# Patient Record
Sex: FEMALE | Race: WHITE | Employment: OTHER | URBAN - NONMETROPOLITAN AREA
[De-identification: names, ages, dates, MRNs, and addresses within clinical notes are randomized per-mention and may not be internally consistent; named-entity substitution may affect disease eponyms.]

---

## 2017-02-22 ENCOUNTER — TELEPHONE (OUTPATIENT)
Dept: PRIMARY CARE CLINIC | Age: 52
End: 2017-02-22

## 2017-03-03 ENCOUNTER — OFFICE VISIT (OUTPATIENT)
Dept: PRIMARY CARE CLINIC | Age: 52
End: 2017-03-03
Payer: MEDICAID

## 2017-03-03 VITALS
TEMPERATURE: 97.6 F | SYSTOLIC BLOOD PRESSURE: 111 MMHG | DIASTOLIC BLOOD PRESSURE: 71 MMHG | HEART RATE: 84 BPM | WEIGHT: 196.4 LBS | BODY MASS INDEX: 32.72 KG/M2 | HEIGHT: 65 IN | RESPIRATION RATE: 18 BRPM | OXYGEN SATURATION: 99 %

## 2017-03-03 DIAGNOSIS — E78.49 OTHER HYPERLIPIDEMIA: ICD-10-CM

## 2017-03-03 DIAGNOSIS — M95.4 CHEST WALL DEFORMITY: ICD-10-CM

## 2017-03-03 DIAGNOSIS — F41.9 ANXIETY: ICD-10-CM

## 2017-03-03 DIAGNOSIS — Z12.11 SCREENING FOR COLON CANCER: ICD-10-CM

## 2017-03-03 DIAGNOSIS — F32.A ANXIETY AND DEPRESSION: ICD-10-CM

## 2017-03-03 DIAGNOSIS — G71.00 MUSCULAR DYSTROPHY (HCC): ICD-10-CM

## 2017-03-03 DIAGNOSIS — Z13.1 ENCOUNTER FOR SCREENING FOR DIABETES MELLITUS: ICD-10-CM

## 2017-03-03 DIAGNOSIS — F41.9 ANXIETY AND DEPRESSION: ICD-10-CM

## 2017-03-03 DIAGNOSIS — R07.81 RIB PAIN ON RIGHT SIDE: ICD-10-CM

## 2017-03-03 PROCEDURE — 99214 OFFICE O/P EST MOD 30 MIN: CPT | Performed by: NURSE PRACTITIONER

## 2017-03-03 RX ORDER — NAPROXEN SODIUM 550 MG/1
550 TABLET ORAL 2 TIMES DAILY WITH MEALS
Qty: 60 TABLET | Refills: 0 | Status: SHIPPED | OUTPATIENT
Start: 2017-03-03 | End: 2017-04-27 | Stop reason: SDUPTHER

## 2017-03-03 RX ORDER — SERTRALINE HYDROCHLORIDE 100 MG/1
150 TABLET, FILM COATED ORAL DAILY
Qty: 135 TABLET | Refills: 1 | Status: SHIPPED | OUTPATIENT
Start: 2017-03-03 | End: 2017-04-27 | Stop reason: SDUPTHER

## 2017-03-08 ENCOUNTER — TELEPHONE (OUTPATIENT)
Dept: PRIMARY CARE CLINIC | Age: 52
End: 2017-03-08

## 2017-03-08 ASSESSMENT — ENCOUNTER SYMPTOMS
WHEEZING: 0
DIARRHEA: 0
BLOOD IN STOOL: 0
ABDOMINAL PAIN: 0
CONSTIPATION: 0
COUGH: 0

## 2017-03-17 ENCOUNTER — HOSPITAL ENCOUNTER (EMERGENCY)
Facility: HOSPITAL | Age: 52
Discharge: HOME | End: 2017-03-17
Attending: FAMILY MEDICINE | Admitting: FAMILY MEDICINE

## 2017-03-17 ENCOUNTER — APPOINTMENT (OUTPATIENT)
Dept: GENERAL RADIOLOGY | Facility: HOSPITAL | Age: 52
End: 2017-03-17

## 2017-03-17 VITALS
SYSTOLIC BLOOD PRESSURE: 143 MMHG | HEIGHT: 65 IN | HEART RATE: 81 BPM | RESPIRATION RATE: 20 BRPM | TEMPERATURE: 97.6 F | WEIGHT: 165 LBS | OXYGEN SATURATION: 98 % | DIASTOLIC BLOOD PRESSURE: 83 MMHG | BODY MASS INDEX: 27.49 KG/M2

## 2017-03-17 DIAGNOSIS — S92.335A CLOSED NONDISPLACED FRACTURE OF THIRD METATARSAL BONE OF LEFT FOOT, INITIAL ENCOUNTER: Primary | ICD-10-CM

## 2017-03-17 PROCEDURE — 73630 X-RAY EXAM OF FOOT: CPT

## 2017-03-17 PROCEDURE — 99283 EMERGENCY DEPT VISIT LOW MDM: CPT

## 2017-03-17 RX ORDER — SERTRALINE HYDROCHLORIDE 100 MG/1
TABLET, FILM COATED ORAL
COMMUNITY
Start: 2017-03-03 | End: 2017-08-30

## 2017-03-17 RX ORDER — HYDROCODONE BITARTRATE AND ACETAMINOPHEN 7.5; 325 MG/1; MG/1
TABLET ORAL EVERY 6 HOURS
COMMUNITY
End: 2017-06-22

## 2017-03-17 RX ORDER — ONDANSETRON 4 MG/1
4 TABLET, ORALLY DISINTEGRATING ORAL ONCE
Status: COMPLETED | OUTPATIENT
Start: 2017-03-17 | End: 2017-03-17

## 2017-03-17 RX ORDER — ONDANSETRON 4 MG/1
4 TABLET, ORALLY DISINTEGRATING ORAL 4 TIMES DAILY PRN
Qty: 20 TABLET | Refills: 0 | Status: SHIPPED | OUTPATIENT
Start: 2017-03-17 | End: 2017-06-22

## 2017-03-17 RX ORDER — OXYCODONE AND ACETAMINOPHEN 10; 325 MG/1; MG/1
1 TABLET ORAL EVERY 4 HOURS PRN
Qty: 18 TABLET | Refills: 0 | Status: SHIPPED | OUTPATIENT
Start: 2017-03-17 | End: 2017-06-22 | Stop reason: SINTOL

## 2017-03-17 RX ORDER — OXYCODONE HYDROCHLORIDE AND ACETAMINOPHEN 5; 325 MG/1; MG/1
2 TABLET ORAL ONCE
Status: COMPLETED | OUTPATIENT
Start: 2017-03-17 | End: 2017-03-17

## 2017-03-17 RX ADMIN — OXYCODONE HYDROCHLORIDE AND ACETAMINOPHEN 2 TABLET: 5; 325 TABLET ORAL at 20:51

## 2017-03-17 RX ADMIN — ONDANSETRON 4 MG: 4 TABLET, ORALLY DISINTEGRATING ORAL at 20:51

## 2017-03-18 NOTE — ED PROVIDER NOTES
Subjective   Patient is a 52 y.o. female presenting with lower extremity pain.   Lower Extremity Issue   Location:  Foot  Time since incident:  8 hours  Injury: yes    Mechanism of injury comment:  Tripped on a rug  Foot location:  L foot  Pain details:     Quality:  Throbbing    Radiates to:  Does not radiate    Severity:  Moderate    Onset quality:  Sudden    Timing:  Intermittent    Progression:  Worsening  Chronicity:  New  Foreign body present:  No foreign bodies  Tetanus status:  Up to date  Prior injury to area:  No  Relieved by:  None tried  Worsened by:  Activity and bearing weight  Associated symptoms: no back pain, no decreased ROM, no fatigue, no fever, no itching, no muscle weakness, no neck pain, no numbness, no stiffness, no swelling and no tingling    Risk factors: frequent fractures    Risk factors: no concern for non-accidental trauma        Review of Systems   Constitutional: Negative for activity change, appetite change, chills, diaphoresis, fatigue, fever and unexpected weight change.   HENT: Negative for congestion, dental problem, ear pain, hearing loss, mouth sores, nosebleeds, postnasal drip, rhinorrhea, sinus pressure, sneezing, sore throat, trouble swallowing and voice change.    Eyes: Negative for photophobia, pain, redness and visual disturbance.   Respiratory: Negative.    Cardiovascular: Negative for chest pain, palpitations and leg swelling.   Gastrointestinal: Negative for abdominal distention, abdominal pain, blood in stool, constipation and nausea.   Endocrine: Negative for cold intolerance and heat intolerance.   Genitourinary: Negative for decreased urine volume, difficulty urinating, dysuria, flank pain, hematuria, pelvic pain and urgency.   Musculoskeletal: Negative for arthralgias, back pain, neck pain, neck stiffness and stiffness.   Skin: Negative for color change, itching, pallor, rash and wound.   Allergic/Immunologic: Negative for environmental allergies.   Neurological:  Negative for dizziness, seizures, syncope, facial asymmetry, speech difficulty, weakness, light-headedness, numbness and headaches.   Hematological: Negative for adenopathy. Does not bruise/bleed easily.   Psychiatric/Behavioral: Negative for confusion and sleep disturbance. The patient is not nervous/anxious.    All other systems reviewed and are negative.      Past Medical History   Diagnosis Date   • Anxiety    • Cystocele    • Depression    • Hyperlipemia    • Incontinence of urine    • Muscular dystrophy    • Trichomonal vaginitis        Allergies   Allergen Reactions   • Bacitracin-Neomycin-Polymyxin        Past Surgical History   Procedure Laterality Date   • Cataract extraction     • Neck surgery     • Appendectomy     • Cholecystectomy     • Hysterectomy     •  section     • Closed reduction metatarsal fracture         History reviewed. No pertinent family history.    Social History     Social History   • Marital status:      Spouse name: N/A   • Number of children: N/A   • Years of education: N/A     Social History Main Topics   • Smoking status: Heavy Tobacco Smoker   • Smokeless tobacco: None   • Alcohol use No   • Drug use: None   • Sexual activity: Not Asked     Other Topics Concern   • None     Social History Narrative   • None           Objective   Physical Exam   Constitutional: She is oriented to person, place, and time. She appears well-developed and well-nourished.   HENT:   Head: Normocephalic.   Nose: Nose normal.   Mouth/Throat: Oropharynx is clear and moist.   Eyes: Conjunctivae and EOM are normal. Pupils are equal, round, and reactive to light.   Neck: Normal range of motion. Neck supple. No JVD present. No thyromegaly present.   Cardiovascular: Normal rate, regular rhythm, normal heart sounds and intact distal pulses.    Pulmonary/Chest: Effort normal and breath sounds normal.   Abdominal: Soft. Bowel sounds are normal. She exhibits no distension and no mass. There is no  tenderness. There is no rebound and no guarding.   Musculoskeletal: Normal range of motion.        Left foot: There is tenderness and bony tenderness. There is no laceration.        Lymphadenopathy:     She has no cervical adenopathy.   Neurological: She is alert and oriented to person, place, and time.   Skin: Skin is warm and dry. No rash noted. No erythema.   Psychiatric: She has a normal mood and affect. Her behavior is normal. Judgment and thought content normal.   Nursing note and vitals reviewed.      Procedures         ED Course  ED Course                  MDM  Number of Diagnoses or Management Options  Closed nondisplaced fracture of third metatarsal bone of left foot, initial encounter: new and requires workup     Amount and/or Complexity of Data Reviewed  Tests in the radiology section of CPT®: ordered and reviewed  Review and summarize past medical records: yes  Independent visualization of images, tracings, or specimens: yes    Risk of Complications, Morbidity, and/or Mortality  Presenting problems: moderate  Diagnostic procedures: moderate  Management options: moderate    Patient Progress  Patient progress: stable      Final diagnoses:   Closed nondisplaced fracture of third metatarsal bone of left foot, initial encounter            Billy Mcgarry MD  03/17/17 4041

## 2017-03-27 ENCOUNTER — TELEPHONE (OUTPATIENT)
Dept: PRIMARY CARE CLINIC | Age: 52
End: 2017-03-27

## 2017-04-04 ENCOUNTER — TELEPHONE (OUTPATIENT)
Dept: PRIMARY CARE CLINIC | Age: 52
End: 2017-04-04

## 2017-04-27 ENCOUNTER — OFFICE VISIT (OUTPATIENT)
Dept: PRIMARY CARE CLINIC | Age: 52
End: 2017-04-27
Payer: MEDICAID

## 2017-04-27 VITALS
DIASTOLIC BLOOD PRESSURE: 76 MMHG | SYSTOLIC BLOOD PRESSURE: 100 MMHG | HEART RATE: 76 BPM | BODY MASS INDEX: 31.34 KG/M2 | TEMPERATURE: 97.5 F | OXYGEN SATURATION: 97 % | HEIGHT: 66 IN | RESPIRATION RATE: 18 BRPM | WEIGHT: 195 LBS

## 2017-04-27 DIAGNOSIS — G71.00 MUSCULAR DYSTROPHY (HCC): Primary | ICD-10-CM

## 2017-04-27 DIAGNOSIS — Z12.39 ENCOUNTER FOR SCREENING BREAST EXAMINATION: ICD-10-CM

## 2017-04-27 DIAGNOSIS — R07.89 OTHER CHEST PAIN: ICD-10-CM

## 2017-04-27 DIAGNOSIS — M95.4 CHEST WALL DEFORMITY: ICD-10-CM

## 2017-04-27 DIAGNOSIS — F41.9 ANXIETY AND DEPRESSION: ICD-10-CM

## 2017-04-27 DIAGNOSIS — R07.81 RIB PAIN ON RIGHT SIDE: ICD-10-CM

## 2017-04-27 DIAGNOSIS — F32.A ANXIETY AND DEPRESSION: ICD-10-CM

## 2017-04-27 DIAGNOSIS — F41.9 ANXIETY: ICD-10-CM

## 2017-04-27 PROCEDURE — 99214 OFFICE O/P EST MOD 30 MIN: CPT | Performed by: NURSE PRACTITIONER

## 2017-04-27 RX ORDER — HYDROCODONE BITARTRATE AND ACETAMINOPHEN 7.5; 325 MG/1; MG/1
1 TABLET ORAL EVERY 6 HOURS PRN
Qty: 90 TABLET | Refills: 0 | Status: SHIPPED | OUTPATIENT
Start: 2017-04-27 | End: 2017-05-31 | Stop reason: ALTCHOICE

## 2017-04-27 RX ORDER — NAPROXEN SODIUM 550 MG/1
550 TABLET ORAL 2 TIMES DAILY WITH MEALS
Qty: 60 TABLET | Refills: 0 | Status: SHIPPED | OUTPATIENT
Start: 2017-04-27 | End: 2017-05-31

## 2017-04-27 RX ORDER — DIPHENOXYLATE HYDROCHLORIDE AND ATROPINE SULFATE 2.5; .025 MG/1; MG/1
1 TABLET ORAL 4 TIMES DAILY PRN
Qty: 120 TABLET | Refills: 0 | Status: SHIPPED | OUTPATIENT
Start: 2017-04-27 | End: 2017-09-03

## 2017-04-27 RX ORDER — SERTRALINE HYDROCHLORIDE 100 MG/1
150 TABLET, FILM COATED ORAL DAILY
Qty: 135 TABLET | Refills: 1 | Status: SHIPPED | OUTPATIENT
Start: 2017-04-27 | End: 2018-08-01 | Stop reason: SDUPTHER

## 2017-04-28 ENCOUNTER — TELEPHONE (OUTPATIENT)
Dept: PRIMARY CARE CLINIC | Age: 52
End: 2017-04-28

## 2017-05-01 ENCOUNTER — TELEPHONE (OUTPATIENT)
Dept: PRIMARY CARE CLINIC | Age: 52
End: 2017-05-01

## 2017-05-01 DIAGNOSIS — G71.00 MUSCULAR DYSTROPHY (HCC): ICD-10-CM

## 2017-05-01 LAB
CHOLESTEROL, TOTAL: 326 MG/DL (ref 160–199)
HBA1C MFR BLD: 5.5 %
HDLC SERPL-MCNC: 48 MG/DL (ref 65–121)
LDL CHOLESTEROL CALCULATED: 247 MG/DL
TRIGL SERPL-MCNC: 154 MG/DL (ref 150–199)

## 2017-05-01 ASSESSMENT — ENCOUNTER SYMPTOMS
EYES NEGATIVE: 1
RESPIRATORY NEGATIVE: 1

## 2017-05-03 ENCOUNTER — TELEPHONE (OUTPATIENT)
Dept: PRIMARY CARE CLINIC | Age: 52
End: 2017-05-03

## 2017-05-03 DIAGNOSIS — Z79.899 ON STATIN THERAPY: Primary | ICD-10-CM

## 2017-05-03 RX ORDER — ROSUVASTATIN CALCIUM 10 MG/1
10 TABLET, COATED ORAL NIGHTLY
Qty: 30 TABLET | Refills: 3 | Status: SHIPPED | OUTPATIENT
Start: 2017-05-03 | End: 2017-05-31

## 2017-05-08 DIAGNOSIS — G71.00 MUSCULAR DYSTROPHY (HCC): Primary | ICD-10-CM

## 2017-05-16 ENCOUNTER — HOSPITAL ENCOUNTER (OUTPATIENT)
Dept: NON INVASIVE DIAGNOSTICS | Age: 52
Discharge: HOME | End: 2017-05-16
Payer: MEDICAID

## 2017-05-16 ENCOUNTER — TELEPHONE (OUTPATIENT)
Dept: PRIMARY CARE CLINIC | Age: 52
End: 2017-05-16

## 2017-05-16 ENCOUNTER — HOSPITAL ENCOUNTER (OUTPATIENT)
Dept: WOMENS IMAGING | Age: 52
Discharge: HOME | End: 2017-05-16
Payer: MEDICAID

## 2017-05-16 DIAGNOSIS — Z12.31 VISIT FOR SCREENING MAMMOGRAM: ICD-10-CM

## 2017-05-16 DIAGNOSIS — G71.00 MUSCULAR DYSTROPHY (HCC): ICD-10-CM

## 2017-05-16 LAB
HEREDITARY CANCER TEST-MYRIAD: NORMAL
LV EF: 58 %
LVEF MODALITY: NORMAL

## 2017-05-16 PROCEDURE — G0202 SCR MAMMO BI INCL CAD: HCPCS

## 2017-05-16 PROCEDURE — 36415 COLL VENOUS BLD VENIPUNCTURE: CPT

## 2017-05-16 PROCEDURE — 93306 TTE W/DOPPLER COMPLETE: CPT

## 2017-05-22 ENCOUNTER — TELEPHONE (OUTPATIENT)
Dept: PRIMARY CARE CLINIC | Age: 52
End: 2017-05-22

## 2017-05-23 ENCOUNTER — TELEPHONE (OUTPATIENT)
Dept: PRIMARY CARE CLINIC | Age: 52
End: 2017-05-23

## 2017-05-31 ENCOUNTER — OFFICE VISIT (OUTPATIENT)
Dept: PRIMARY CARE CLINIC | Age: 52
End: 2017-05-31
Payer: MEDICAID

## 2017-05-31 VITALS
BODY MASS INDEX: 31.34 KG/M2 | HEIGHT: 66 IN | TEMPERATURE: 97 F | HEART RATE: 88 BPM | OXYGEN SATURATION: 97 % | SYSTOLIC BLOOD PRESSURE: 134 MMHG | RESPIRATION RATE: 18 BRPM | WEIGHT: 195 LBS | DIASTOLIC BLOOD PRESSURE: 74 MMHG

## 2017-05-31 DIAGNOSIS — G71.00 MUSCULAR DYSTROPHY (HCC): Primary | ICD-10-CM

## 2017-05-31 DIAGNOSIS — M62.838 MUSCLE SPASM OF BOTH LOWER LEGS: ICD-10-CM

## 2017-05-31 PROCEDURE — 99213 OFFICE O/P EST LOW 20 MIN: CPT | Performed by: NURSE PRACTITIONER

## 2017-05-31 RX ORDER — BACLOFEN 10 MG/1
10 TABLET ORAL 2 TIMES DAILY
Qty: 60 TABLET | Refills: 0 | Status: SHIPPED | OUTPATIENT
Start: 2017-05-31 | End: 2017-08-11 | Stop reason: ALTCHOICE

## 2017-05-31 RX ORDER — GABAPENTIN 100 MG/1
100 CAPSULE ORAL DAILY
Qty: 90 CAPSULE | Refills: 3 | Status: SHIPPED | OUTPATIENT
Start: 2017-05-31 | End: 2017-08-11

## 2017-05-31 ASSESSMENT — ENCOUNTER SYMPTOMS
ABDOMINAL PAIN: 0
BLOOD IN STOOL: 0
TROUBLE SWALLOWING: 0
RHINORRHEA: 0
COUGH: 0
CONSTIPATION: 0
SORE THROAT: 0
DIARRHEA: 0
EYE DISCHARGE: 0
WHEEZING: 0
EYE REDNESS: 0

## 2017-06-09 DIAGNOSIS — R26.89 DECREASED MOBILITY: ICD-10-CM

## 2017-06-09 DIAGNOSIS — G71.09 CONGENITAL HEREDITARY MUSCULAR DYSTROPHY (HCC): Primary | ICD-10-CM

## 2017-06-22 ENCOUNTER — OFFICE VISIT (OUTPATIENT)
Dept: GASTROENTEROLOGY | Facility: CLINIC | Age: 52
End: 2017-06-22

## 2017-06-22 VITALS
DIASTOLIC BLOOD PRESSURE: 82 MMHG | OXYGEN SATURATION: 99 % | HEIGHT: 66 IN | TEMPERATURE: 97.3 F | SYSTOLIC BLOOD PRESSURE: 140 MMHG | WEIGHT: 199 LBS | BODY MASS INDEX: 31.98 KG/M2 | HEART RATE: 78 BPM

## 2017-06-22 DIAGNOSIS — Z80.0 FAMILY HX OF COLON CANCER: ICD-10-CM

## 2017-06-22 DIAGNOSIS — K52.9 CHRONIC DIARRHEA: ICD-10-CM

## 2017-06-22 DIAGNOSIS — Z72.0 TOBACCO USE: ICD-10-CM

## 2017-06-22 DIAGNOSIS — Z87.19 HISTORY OF GASTRIC POLYP: Primary | ICD-10-CM

## 2017-06-22 PROCEDURE — 99204 OFFICE O/P NEW MOD 45 MIN: CPT | Performed by: NURSE PRACTITIONER

## 2017-06-22 RX ORDER — BACLOFEN 10 MG/1
TABLET ORAL
COMMUNITY
Start: 2017-05-31 | End: 2017-06-22

## 2017-06-22 RX ORDER — DIPHENOXYLATE HYDROCHLORIDE AND ATROPINE SULFATE 2.5; .025 MG/1; MG/1
TABLET ORAL AS NEEDED
COMMUNITY
Start: 2017-04-27

## 2017-06-22 RX ORDER — SIMVASTATIN 80 MG
80 TABLET ORAL 2 TIMES DAILY
COMMUNITY
End: 2018-08-28

## 2017-06-22 RX ORDER — TRAMADOL HYDROCHLORIDE 50 MG/1
TABLET ORAL
Refills: 0 | COMMUNITY
Start: 2017-03-22 | End: 2017-06-22

## 2017-06-22 RX ORDER — GABAPENTIN 100 MG/1
CAPSULE ORAL
COMMUNITY
Start: 2017-05-31 | End: 2017-06-22

## 2017-06-22 NOTE — PATIENT INSTRUCTIONS
Smoking Hazards  Smoking cigarettes is extremely bad for your health. Tobacco smoke has over 200 known poisons in it. It contains the poisonous gases nitrogen oxide and carbon monoxide. There are over 60 chemicals in tobacco smoke that cause cancer. Some of the chemicals found in cigarette smoke include:   · Cyanide.    · Benzene.    · Formaldehyde.    · Methanol (wood alcohol).    · Acetylene (fuel used in welding torches).    · Ammonia.    Even smoking lightly shortens your life expectancy by several years. You can greatly reduce the risk of medical problems for you and your family by stopping now. Smoking is the most preventable cause of death and disease in our society. Within days of quitting smoking, your circulation improves, you decrease the risk of having a heart attack, and your lung capacity improves. There may be some increased phlegm in the first few days after quitting, and it may take months for your lungs to clear up completely. Quitting for 10 years reduces your risk of developing lung cancer to almost that of a nonsmoker.   WHAT ARE THE RISKS OF SMOKING?  Cigarette smokers have an increased risk of many serious medical problems, including:  · Lung cancer.    · Lung disease (such as pneumonia, bronchitis, and emphysema).    · Heart attack and chest pain due to the heart not getting enough oxygen (angina).    · Heart disease and peripheral blood vessel disease.    · Hypertension.    · Stroke.    · Oral cancer (cancer of the lip, mouth, or voice box).    · Bladder cancer.    · Pancreatic cancer.    · Cervical cancer.    · Pregnancy complications, including premature birth.    · Stillbirths and smaller  babies, birth defects, and genetic damage to sperm.    · Early menopause.    · Lower estrogen level for women.    · Infertility.    · Facial wrinkles.    · Blindness.    · Increased risk of broken bones (fractures).    · Senile dementia.    · Stomach ulcers and internal bleeding.    · Delayed  wound healing and increased risk of complications during surgery.  Because of secondhand smoke exposure, children of smokers have an increased risk of the following:   · Sudden infant death syndrome (SIDS).    · Respiratory infections.    · Lung cancer.    · Heart disease.    · Ear infections.    WHY IS SMOKING ADDICTIVE?  Nicotine is the chemical agent in tobacco that is capable of causing addiction or dependence. When you smoke and inhale, nicotine is absorbed rapidly into the bloodstream through your lungs. Both inhaled and noninhaled nicotine may be addictive.   WHAT ARE THE BENEFITS OF QUITTING?   There are many health benefits to quitting smoking. Some are:   · The likelihood of developing cancer and heart disease decreases. Health improvements are seen almost immediately.    · Blood pressure, pulse rate, and breathing patterns start returning to normal soon after quitting.    · People who quit may see an improvement in their overall quality of life.    HOW DO YOU QUIT SMOKING?  Smoking is an addiction with both physical and psychological effects, and longtime habits can be hard to change. Your health care provider can recommend:  · Programs and community resources, which may include group support, education, or therapy.  · Replacement products, such as patches, gum, and nasal sprays. Use these products only as directed. Do not replace cigarette smoking with electronic cigarettes (commonly called e-cigarettes). The safety of e-cigarettes is unknown, and some may contain harmful chemicals.  FOR MORE INFORMATION  · American Lung Association: www.lung.org  · American Cancer Society: www.cancer.org     This information is not intended to replace advice given to you by your health care provider. Make sure you discuss any questions you have with your health care provider.     Document Released: 01/25/2006 Document Revised: 04/10/2017 Document Reviewed: 06/09/2014  [employer] Interactive Patient Education ©2017  [employer]    Steps to Quit Smoking   Smoking tobacco can be harmful to your health and can affect almost every organ in your body. Smoking puts you, and those around you, at risk for developing many serious chronic diseases. Quitting smoking is difficult, but it is one of the best things that you can do for your health. It is never too late to quit.  WHAT ARE THE BENEFITS OF QUITTING SMOKING?  When you quit smoking, you lower your risk of developing serious diseases and conditions, such as:  · Lung cancer or lung disease, such as COPD.  · Heart disease.  · Stroke.  · Heart attack.  · Infertility.  · Osteoporosis and bone fractures.  Additionally, symptoms such as coughing, wheezing, and shortness of breath may get better when you quit. You may also find that you get sick less often because your body is stronger at fighting off colds and infections. If you are pregnant, quitting smoking can help to reduce your chances of having a baby of low birth weight.  HOW DO I GET READY TO QUIT?  When you decide to quit smoking, create a plan to make sure that you are successful. Before you quit:  · Pick a date to quit. Set a date within the next two weeks to give you time to prepare.  · Write down the reasons why you are quitting. Keep this list in places where you will see it often, such as on your bathroom mirror or in your car or wallet.  · Identify the people, places, things, and activities that make you want to smoke (triggers) and avoid them. Make sure to take these actions:    Throw away all cigarettes at home, at work, and in your car.    Throw away smoking accessories, such as ashtrays and lighters.    Clean your car and make sure to empty the ashtray.    Clean your home, including curtains and carpets.  · Tell your family, friends, and coworkers that you are quitting. Support from your loved ones can make quitting easier.  · Talk with your health care provider about your options for quitting smoking.  · Find out  "what treatment options are covered by your health insurance.  WHAT STRATEGIES CAN I USE TO QUIT SMOKING?   Talk with your healthcare provider about different strategies to quit smoking. Some strategies include:  · Quitting smoking altogether instead of gradually lessening how much you smoke over a period of time. Research shows that quitting \"cold turkey\" is more successful than gradually quitting.  · Attending in-person counseling to help you build problem-solving skills. You are more likely to have success in quitting if you attend several counseling sessions. Even short sessions of 10 minutes can be effective.  · Finding resources and support systems that can help you to quit smoking and remain smoke-free after you quit. These resources are most helpful when you use them often. They can include:    Online chats with a counselor.    Telephone quitlines.    Printed self-help materials.    Support groups or group counseling.    Text messaging programs.    Mobile phone applications.  · Taking medicines to help you quit smoking. (If you are pregnant or breastfeeding, talk with your health care provider first.) Some medicines contain nicotine and some do not. Both types of medicines help with cravings, but the medicines that include nicotine help to relieve withdrawal symptoms. Your health care provider may recommend:    Nicotine patches, gum, or lozenges.    Nicotine inhalers or sprays.    Non-nicotine medicine that is taken by mouth.  Talk with your health care provider about combining strategies, such as taking medicines while you are also receiving in-person counseling. Using these two strategies together makes you more likely to succeed in quitting than if you used either strategy on its own.  If you are pregnant or breastfeeding, talk with your health care provider about finding counseling or other support strategies to quit smoking. Do not take medicine to help you quit smoking unless told to do so by your " health care provider.  WHAT THINGS CAN I DO TO MAKE IT EASIER TO QUIT?  Quitting smoking might feel overwhelming at first, but there is a lot that you can do to make it easier. Take these important actions:  · Reach out to your family and friends and ask that they support and encourage you during this time. Call telephone quitlines, reach out to support groups, or work with a counselor for support.  · Ask people who smoke to avoid smoking around you.  · Avoid places that trigger you to smoke, such as bars, parties, or smoke-break areas at work.  · Spend time around people who do not smoke.  · Lessen stress in your life, because stress can be a smoking trigger for some people. To lessen stress, try:    Exercising regularly.    Deep-breathing exercises.    Yoga.    Meditating.    Performing a body scan. This involves closing your eyes, scanning your body from head to toe, and noticing which parts of your body are particularly tense. Purposefully relax the muscles in those areas.  · Download or purchase mobile phone or tablet apps (applications) that can help you stick to your quit plan by providing reminders, tips, and encouragement. There are many free apps, such as QuitGuide from the CDC (Centers for Disease Control and Prevention). You can find other support for quitting smoking (smoking cessation) through smokefree.gov and other websites.  HOW WILL I FEEL WHEN I QUIT SMOKING?  Within the first 24 hours of quitting smoking, you may start to feel some withdrawal symptoms. These symptoms are usually most noticeable 2-3 days after quitting, but they usually do not last beyond 2-3 weeks. Changes or symptoms that you might experience include:  · Mood swings.  · Restlessness, anxiety, or irritation.  · Difficulty concentrating.  · Dizziness.  · Strong cravings for sugary foods in addition to nicotine.  · Mild weight gain.  · Constipation.  · Nausea.  · Coughing or a sore throat.  · Changes in how your medicines work in  your body.  · A depressed mood.  · Difficulty sleeping (insomnia).  After the first 2-3 weeks of quitting, you may start to notice more positive results, such as:  · Improved sense of smell and taste.  · Decreased coughing and sore throat.  · Slower heart rate.  · Lower blood pressure.  · Clearer skin.  · The ability to breathe more easily.  · Fewer sick days.  Quitting smoking is very challenging for most people. Do not get discouraged if you are not successful the first time. Some people need to make many attempts to quit before they achieve long-term success. Do your best to stick to your quit plan, and talk with your health care provider if you have any questions or concerns.     This information is not intended to replace advice given to you by your health care provider. Make sure you discuss any questions you have with your health care provider.     Document Released: 12/12/2002 Document Revised: 05/03/2016 Document Reviewed: 05/03/2016  [employer] Interactive Patient Education ©2017 [employer]

## 2017-06-22 NOTE — PROGRESS NOTES
[facility] GASTROENTEROLOGY - OFFICE NOTE    2017    Valencia Fonseca   1965    Primary Physician: Julius Quiles MD    Chief Complaint   Patient presents with   • Colonoscopy   chronic diarrhea   H/o duodenal polyp.       HISTORY OF PRESENT ILLNESS    Valencia Fonseca is a 52 y.o. female presents  with History of adenomatous duodenal polyp  found by Dr. Segura..  Last upper endoscopy was 2014 by Dr. Coats noting  2 diminutive 2-mm  Polyps.  One was noted in the third portion of the duodenum, it was small enough that he elected to destroy this using a snare with cautery.  Right across from the ampulla of Vater on the opposite wall there was a second small polyp, this was destroyed with cautery as well.  It was recommended she have another GD in 3 years.  Note there was no tissue retrieved for histological review.  She denies any dysphagia or odynophagia.  No nausea or vomiting.  No fevers or chills.  No unintentional weight loss.  No reflux symptoms.  Appetite is good.    She does have problems with chronic diarrhea several years.  Has 3-4 bowel movements daily.  Does use Lomotil which does seem to help.  No rectal bleeding.  She has a cousin who had colon cancer.  She denies any personal history of colon polyps.  Last colonoscopy grade  which was normal and she was recommended to have another colonoscopy at age 51 in 8 years.    Past Medical History:   Diagnosis Date   • Anxiety    • Arthritis    • Cancer     uterine   • Colon polyp    • Cystocele    • Depression    • Disease of thyroid gland    • Hyperlipemia    • IBS (irritable bowel syndrome)    • Incontinence of urine    • Muscular dystrophy    • Myotonic dystrophy    • Trichomonal vaginitis        Past Surgical History:   Procedure Laterality Date   • ANKLE SURGERY     • APPENDECTOMY     • CATARACT EXTRACTION     •  SECTION     • CHOLECYSTECTOMY     • CLOSED REDUCTION METATARSAL FRACTURE     • COLONOSCOPY  2009   •  ENDOSCOPY  04/17/2014   • FOOT SURGERY     • HYSTERECTOMY     • NECK SURGERY         Outpatient Prescriptions Marked as Taking for the 6/22/17 encounter (Office Visit) with HAILEY Dunbar   Medication Sig Dispense Refill   • Diapers & Supplies misc pullups wipes and bed pads. One month supply-patient needs appointment     • diphenoxylate-atropine (LOMOTIL) 2.5-0.025 MG per tablet Take  by mouth.     • sertraline (ZOLOFT) 100 MG tablet Take 1.5 tablets by mouth daily     • simvastatin (ZOCOR) 80 MG tablet Take 80 mg by mouth 2 (Two) Times a Day.     • [DISCONTINUED] baclofen (LIORESAL) 10 MG tablet Take  by mouth.     • [DISCONTINUED] gabapentin (NEURONTIN) 100 MG capsule Take  by mouth.         Allergies   Allergen Reactions   • Bacitracin-Neomycin-Polymyxin    • Neosporin [Neomycin-Bacitracin Zn-Polymyx]    • Percocet [Oxycodone-Acetaminophen] Itching   • Tramadol        Social History     Social History   • Marital status:      Spouse name: N/A   • Number of children: N/A   • Years of education: N/A     Occupational History   • Not on file.     Social History Main Topics   • Smoking status: Heavy Tobacco Smoker   • Smokeless tobacco: Never Used   • Alcohol use No   • Drug use: Yes     Special: Marijuana   • Sexual activity: Not on file     Other Topics Concern   • Not on file     Social History Narrative       Family History   Problem Relation Age of Onset   • Colon cancer Cousin    • Colon polyps Neg Hx        Review of Systems   Constitutional: Negative for appetite change, chills, fatigue, fever and unexpected weight change.   HENT: Negative for sore throat and trouble swallowing.    Respiratory: Negative for cough, chest tightness, shortness of breath and wheezing.    Cardiovascular: Negative for chest pain and palpitations.   Gastrointestinal: Positive for diarrhea. Negative for abdominal distention, abdominal pain, anal bleeding, blood in stool, constipation, nausea, rectal pain and vomiting.     "    As mentioned in hpi   Genitourinary: Negative for dysuria and hematuria.        Incontinent at times.   Musculoskeletal: Positive for gait problem (due to muscular dystrophyshe does use a wheelchair at home.). Negative for arthralgias and back pain.   Skin: Negative for color change and rash.   Neurological: Negative for dizziness, tremors, seizures, syncope, light-headedness and headaches.        Extremity weakness states due to muscular dystrophy.   Hematological: Negative for adenopathy. Does not bruise/bleed easily.   Psychiatric/Behavioral: Negative for confusion. The patient is not nervous/anxious.        Vitals:    06/22/17 1319   BP: 140/82   BP Location: Left arm   Patient Position: Sitting   Cuff Size: Adult   Pulse: 78   Temp: 97.3 °F (36.3 °C)   SpO2: 99%   Weight: 199 lb (90.3 kg)   Height: 65.5\" (166.4 cm)      Body mass index is 32.61 kg/(m^2).    Physical Exam   Constitutional: She is oriented to person, place, and time. She appears well-developed and well-nourished. No distress.   HENT:   Head: Normocephalic and atraumatic.   Mouth/Throat: Oropharynx is clear and moist.   Eyes: Pupils are equal, round, and reactive to light. No scleral icterus.   Neck: Normal range of motion. Neck supple. No JVD present. No tracheal deviation present.   Cardiovascular: Normal rate, regular rhythm, normal heart sounds and intact distal pulses.  Exam reveals no gallop and no friction rub.    No murmur heard.  Pulmonary/Chest: Effort normal and breath sounds normal. No stridor. No respiratory distress. She has no wheezes. She has no rales.   Abdominal: Soft. Bowel sounds are normal. She exhibits no distension and no mass. There is no tenderness. There is no rebound and no guarding.   Musculoskeletal: Normal range of motion. She exhibits no edema or deformity.   She is in a wheelchair   Neurological: She is alert and oriented to person, place, and time.   Skin: Skin is warm and dry. No rash noted.   Psychiatric: She " has a normal mood and affect. Her behavior is normal.   Vitals reviewed.      No results found for this or any previous visit.        ASSESSMENT AND PLAN    Valencia was seen today for colonoscopy.    Diagnoses and all orders for this visit:    History of gastric polyp  -     Case Request; Standing  -     Case Request    Chronic diarrhea  -     Case Request; Standing  -     Case Request    Family hx of colon cancer    Tobacco use    Other orders  -     Implement Anesthesia Orders Day of Procedure; Standing  -     Obtain Informed Consent; Standing    In regards to history of adenomatous gastric polyp, she is due for upper endoscopy and is agreeable to proceed.  In regards to chronic diarrhea, recommend continue Lomotil when necessary.  She is due for colonoscopy as well so we will arrange for that to be done at same time as upper endoscopy.  Strongly recommend she stop smoking as well, counseled over 3 minutes.    ESOPHAGOGASTRODUODENOSCOPY WITH ANESTHESIA (N/A), COLONOSCOPY WITH ANESTHESIA (N/A)   All risks, benefits, alternatives, and indications of colonoscopy procedure have been discussed with the patient. Risks to include perforation of the colon requiring possible surgery or colostomy, risk of bleeding from biopsies or removal of colon tissue, possibility of missing a colon polyp or cancer, or adverse drug reaction.  Benefits to include the diagnosis and management of disease of the colon and rectum. Alternatives to include barium enema, radiographic evaluation, lab testing or no intervention. Pt verbalizes understanding and agrees.     Risk, benefits, and alternatives of endoscopy were explained in full.  They understand that there is a risk of bleeding, perforation, and infection.  The risk of perforation goes up with esophageal dilation.  Other options to evaluate UGI complaints could involve barium swallow or UGI series, but these would be diagnostic tests only.  Patient was given time to ask questions.  I  answered them to their satisfaction and they are agreeable to proceeding        Body mass index is 32.61 kg/(m^2).         HAILEY Alatorre Dragon/transcription disclaimer:  Much of this encounter note is electronic transcription/translation of spoken language to printed text.  The electronic translation of spoken language may be erroneous, or at times, nonsensical words or phrases may be inadvertently transcribed.  Although I have reviewed the note for such errors, some may still exist.    No results found for this or any previous visit.

## 2017-06-26 ENCOUNTER — TELEPHONE (OUTPATIENT)
Dept: WOMENS IMAGING | Age: 52
End: 2017-06-26

## 2017-07-03 ENCOUNTER — OFFICE VISIT (OUTPATIENT)
Dept: PRIMARY CARE CLINIC | Age: 52
End: 2017-07-03
Payer: MEDICAID

## 2017-07-03 VITALS
BODY MASS INDEX: 30.37 KG/M2 | SYSTOLIC BLOOD PRESSURE: 116 MMHG | OXYGEN SATURATION: 98 % | DIASTOLIC BLOOD PRESSURE: 82 MMHG | HEART RATE: 80 BPM | HEIGHT: 66 IN | TEMPERATURE: 97.6 F | WEIGHT: 189 LBS

## 2017-07-03 DIAGNOSIS — E78.49 OTHER HYPERLIPIDEMIA: ICD-10-CM

## 2017-07-03 DIAGNOSIS — G71.09 CONGENITAL HEREDITARY MUSCULAR DYSTROPHY (HCC): Primary | ICD-10-CM

## 2017-07-03 DIAGNOSIS — M62.838 MUSCLE SPASM OF BOTH LOWER LEGS: ICD-10-CM

## 2017-07-03 PROCEDURE — 99214 OFFICE O/P EST MOD 30 MIN: CPT | Performed by: FAMILY MEDICINE

## 2017-07-03 RX ORDER — HYDROCODONE BITARTRATE AND ACETAMINOPHEN 7.5; 325 MG/1; MG/1
1 TABLET ORAL EVERY 8 HOURS PRN
Qty: 90 TABLET | Refills: 0 | Status: SHIPPED | OUTPATIENT
Start: 2017-07-03 | End: 2017-08-11 | Stop reason: ALTCHOICE

## 2017-07-03 RX ORDER — SIMVASTATIN 80 MG
80 TABLET ORAL
COMMUNITY
End: 2017-08-11 | Stop reason: SDUPTHER

## 2017-07-03 ASSESSMENT — ENCOUNTER SYMPTOMS
COLOR CHANGE: 0
SHORTNESS OF BREATH: 0
COUGH: 0

## 2017-07-06 ENCOUNTER — TELEPHONE (OUTPATIENT)
Dept: NEUROSURGERY | Age: 52
End: 2017-07-06

## 2017-07-18 ENCOUNTER — HOSPITAL ENCOUNTER (OUTPATIENT)
Facility: HOSPITAL | Age: 52
Setting detail: HOSPITAL OUTPATIENT SURGERY
Discharge: HOME | End: 2017-07-18
Attending: INTERNAL MEDICINE | Admitting: INTERNAL MEDICINE

## 2017-07-18 ENCOUNTER — TELEPHONE (OUTPATIENT)
Dept: GASTROENTEROLOGY | Facility: CLINIC | Age: 52
End: 2017-07-18

## 2017-07-18 ENCOUNTER — ANESTHESIA EVENT (OUTPATIENT)
Dept: GASTROENTEROLOGY | Facility: HOSPITAL | Age: 52
End: 2017-07-18

## 2017-07-18 ENCOUNTER — ANESTHESIA (OUTPATIENT)
Dept: GASTROENTEROLOGY | Facility: HOSPITAL | Age: 52
End: 2017-07-18

## 2017-07-18 VITALS
HEART RATE: 69 BPM | OXYGEN SATURATION: 100 % | BODY MASS INDEX: 32.14 KG/M2 | HEIGHT: 66 IN | TEMPERATURE: 97.2 F | WEIGHT: 200 LBS | SYSTOLIC BLOOD PRESSURE: 126 MMHG | DIASTOLIC BLOOD PRESSURE: 69 MMHG | RESPIRATION RATE: 18 BRPM

## 2017-07-18 DIAGNOSIS — Z87.19 HISTORY OF GASTRIC POLYP: ICD-10-CM

## 2017-07-18 DIAGNOSIS — K52.9 CHRONIC DIARRHEA: ICD-10-CM

## 2017-07-18 PROCEDURE — 87081 CULTURE SCREEN ONLY: CPT | Performed by: INTERNAL MEDICINE

## 2017-07-18 PROCEDURE — 88305 TISSUE EXAM BY PATHOLOGIST: CPT | Performed by: INTERNAL MEDICINE

## 2017-07-18 PROCEDURE — 43251 EGD REMOVE LESION SNARE: CPT | Performed by: INTERNAL MEDICINE

## 2017-07-18 PROCEDURE — 45385 COLONOSCOPY W/LESION REMOVAL: CPT | Performed by: INTERNAL MEDICINE

## 2017-07-18 PROCEDURE — 43239 EGD BIOPSY SINGLE/MULTIPLE: CPT | Performed by: INTERNAL MEDICINE

## 2017-07-18 PROCEDURE — 25010000002 PROPOFOL 10 MG/ML EMULSION: Performed by: NURSE ANESTHETIST, CERTIFIED REGISTERED

## 2017-07-18 RX ORDER — LIDOCAINE HYDROCHLORIDE 10 MG/ML
0.5 INJECTION, SOLUTION INFILTRATION; PERINEURAL ONCE AS NEEDED
Status: DISCONTINUED | OUTPATIENT
Start: 2017-07-18 | End: 2017-07-18 | Stop reason: HOSPADM

## 2017-07-18 RX ORDER — LIDOCAINE HYDROCHLORIDE 20 MG/ML
INJECTION, SOLUTION INFILTRATION; PERINEURAL AS NEEDED
Status: DISCONTINUED | OUTPATIENT
Start: 2017-07-18 | End: 2017-07-18 | Stop reason: SURG

## 2017-07-18 RX ORDER — SODIUM CHLORIDE 0.9 % (FLUSH) 0.9 %
3 SYRINGE (ML) INJECTION AS NEEDED
Status: DISCONTINUED | OUTPATIENT
Start: 2017-07-18 | End: 2017-07-18 | Stop reason: HOSPADM

## 2017-07-18 RX ORDER — SODIUM CHLORIDE 9 MG/ML
500 INJECTION, SOLUTION INTRAVENOUS CONTINUOUS PRN
Status: DISCONTINUED | OUTPATIENT
Start: 2017-07-18 | End: 2017-07-18 | Stop reason: HOSPADM

## 2017-07-18 RX ORDER — PROPOFOL 10 MG/ML
VIAL (ML) INTRAVENOUS AS NEEDED
Status: DISCONTINUED | OUTPATIENT
Start: 2017-07-18 | End: 2017-07-18 | Stop reason: SURG

## 2017-07-18 RX ORDER — ONDANSETRON 2 MG/ML
4 INJECTION INTRAMUSCULAR; INTRAVENOUS ONCE AS NEEDED
Status: DISCONTINUED | OUTPATIENT
Start: 2017-07-18 | End: 2017-07-18 | Stop reason: HOSPADM

## 2017-07-18 RX ADMIN — SODIUM CHLORIDE 500 ML: 9 INJECTION, SOLUTION INTRAVENOUS at 10:22

## 2017-07-18 RX ADMIN — PROPOFOL 50 MG: 10 INJECTION, EMULSION INTRAVENOUS at 10:37

## 2017-07-18 RX ADMIN — LIDOCAINE HYDROCHLORIDE 100 MG: 20 INJECTION, SOLUTION INFILTRATION; PERINEURAL at 10:37

## 2017-07-18 RX ADMIN — PROPOFOL 300 MG: 10 INJECTION, EMULSION INTRAVENOUS at 10:38

## 2017-07-18 NOTE — PLAN OF CARE
Problem: Patient Care Overview (Adult)  Goal: Plan of Care Review  Outcome: Ongoing (interventions implemented as appropriate)    07/18/17 1059   Patient Care Overview   Progress improving   Outcome Evaluation   Outcome Summary/Follow up Plan pt tolerating procedure well          Problem: GI Endoscopy (Adult)  Goal: Signs and Symptoms of Listed Potential Problems Will be Absent or Manageable (GI Endoscopy)  Outcome: Ongoing (interventions implemented as appropriate)    07/18/17 1059   GI Endoscopy   Problems Assessed (GI Endoscopy) all   Problems Present (GI Endoscopy) none

## 2017-07-18 NOTE — PLAN OF CARE
Problem: GI Endoscopy (Adult)  Goal: Signs and Symptoms of Listed Potential Problems Will be Absent or Manageable (GI Endoscopy)  Outcome: Outcome(s) achieved Date Met:  07/18/17

## 2017-07-18 NOTE — H&P (VIEW-ONLY)
[facility] GASTROENTEROLOGY - OFFICE NOTE    2017    Valencia Fonseca   1965    Primary Physician: Julius Quiles MD    Chief Complaint   Patient presents with   • Colonoscopy   chronic diarrhea   H/o duodenal polyp.       HISTORY OF PRESENT ILLNESS    Valencia Fonseca is a 52 y.o. female presents  with History of adenomatous duodenal polyp  found by Dr. Segura..  Last upper endoscopy was 2014 by Dr. Coats noting  2 diminutive 2-mm  Polyps.  One was noted in the third portion of the duodenum, it was small enough that he elected to destroy this using a snare with cautery.  Right across from the ampulla of Vater on the opposite wall there was a second small polyp, this was destroyed with cautery as well.  It was recommended she have another GD in 3 years.  Note there was no tissue retrieved for histological review.  She denies any dysphagia or odynophagia.  No nausea or vomiting.  No fevers or chills.  No unintentional weight loss.  No reflux symptoms.  Appetite is good.    She does have problems with chronic diarrhea several years.  Has 3-4 bowel movements daily.  Does use Lomotil which does seem to help.  No rectal bleeding.  She has a cousin who had colon cancer.  She denies any personal history of colon polyps.  Last colonoscopy grade  which was normal and she was recommended to have another colonoscopy at age 51 in 8 years.    Past Medical History:   Diagnosis Date   • Anxiety    • Arthritis    • Cancer     uterine   • Colon polyp    • Cystocele    • Depression    • Disease of thyroid gland    • Hyperlipemia    • IBS (irritable bowel syndrome)    • Incontinence of urine    • Muscular dystrophy    • Myotonic dystrophy    • Trichomonal vaginitis        Past Surgical History:   Procedure Laterality Date   • ANKLE SURGERY     • APPENDECTOMY     • CATARACT EXTRACTION     •  SECTION     • CHOLECYSTECTOMY     • CLOSED REDUCTION METATARSAL FRACTURE     • COLONOSCOPY  2009   •  ENDOSCOPY  04/17/2014   • FOOT SURGERY     • HYSTERECTOMY     • NECK SURGERY         Outpatient Prescriptions Marked as Taking for the 6/22/17 encounter (Office Visit) with HAILEY Dunbar   Medication Sig Dispense Refill   • Diapers & Supplies misc pullups wipes and bed pads. One month supply-patient needs appointment     • diphenoxylate-atropine (LOMOTIL) 2.5-0.025 MG per tablet Take  by mouth.     • sertraline (ZOLOFT) 100 MG tablet Take 1.5 tablets by mouth daily     • simvastatin (ZOCOR) 80 MG tablet Take 80 mg by mouth 2 (Two) Times a Day.     • [DISCONTINUED] baclofen (LIORESAL) 10 MG tablet Take  by mouth.     • [DISCONTINUED] gabapentin (NEURONTIN) 100 MG capsule Take  by mouth.         Allergies   Allergen Reactions   • Bacitracin-Neomycin-Polymyxin    • Neosporin [Neomycin-Bacitracin Zn-Polymyx]    • Percocet [Oxycodone-Acetaminophen] Itching   • Tramadol        Social History     Social History   • Marital status:      Spouse name: N/A   • Number of children: N/A   • Years of education: N/A     Occupational History   • Not on file.     Social History Main Topics   • Smoking status: Heavy Tobacco Smoker   • Smokeless tobacco: Never Used   • Alcohol use No   • Drug use: Yes     Special: Marijuana   • Sexual activity: Not on file     Other Topics Concern   • Not on file     Social History Narrative       Family History   Problem Relation Age of Onset   • Colon cancer Cousin    • Colon polyps Neg Hx        Review of Systems   Constitutional: Negative for appetite change, chills, fatigue, fever and unexpected weight change.   HENT: Negative for sore throat and trouble swallowing.    Respiratory: Negative for cough, chest tightness, shortness of breath and wheezing.    Cardiovascular: Negative for chest pain and palpitations.   Gastrointestinal: Positive for diarrhea. Negative for abdominal distention, abdominal pain, anal bleeding, blood in stool, constipation, nausea, rectal pain and vomiting.     "    As mentioned in hpi   Genitourinary: Negative for dysuria and hematuria.        Incontinent at times.   Musculoskeletal: Positive for gait problem (due to muscular dystrophyshe does use a wheelchair at home.). Negative for arthralgias and back pain.   Skin: Negative for color change and rash.   Neurological: Negative for dizziness, tremors, seizures, syncope, light-headedness and headaches.        Extremity weakness states due to muscular dystrophy.   Hematological: Negative for adenopathy. Does not bruise/bleed easily.   Psychiatric/Behavioral: Negative for confusion. The patient is not nervous/anxious.        Vitals:    06/22/17 1319   BP: 140/82   BP Location: Left arm   Patient Position: Sitting   Cuff Size: Adult   Pulse: 78   Temp: 97.3 °F (36.3 °C)   SpO2: 99%   Weight: 199 lb (90.3 kg)   Height: 65.5\" (166.4 cm)      Body mass index is 32.61 kg/(m^2).    Physical Exam   Constitutional: She is oriented to person, place, and time. She appears well-developed and well-nourished. No distress.   HENT:   Head: Normocephalic and atraumatic.   Mouth/Throat: Oropharynx is clear and moist.   Eyes: Pupils are equal, round, and reactive to light. No scleral icterus.   Neck: Normal range of motion. Neck supple. No JVD present. No tracheal deviation present.   Cardiovascular: Normal rate, regular rhythm, normal heart sounds and intact distal pulses.  Exam reveals no gallop and no friction rub.    No murmur heard.  Pulmonary/Chest: Effort normal and breath sounds normal. No stridor. No respiratory distress. She has no wheezes. She has no rales.   Abdominal: Soft. Bowel sounds are normal. She exhibits no distension and no mass. There is no tenderness. There is no rebound and no guarding.   Musculoskeletal: Normal range of motion. She exhibits no edema or deformity.   She is in a wheelchair   Neurological: She is alert and oriented to person, place, and time.   Skin: Skin is warm and dry. No rash noted.   Psychiatric: She " has a normal mood and affect. Her behavior is normal.   Vitals reviewed.      No results found for this or any previous visit.        ASSESSMENT AND PLAN    Valencia was seen today for colonoscopy.    Diagnoses and all orders for this visit:    History of gastric polyp  -     Case Request; Standing  -     Case Request    Chronic diarrhea  -     Case Request; Standing  -     Case Request    Family hx of colon cancer    Tobacco use    Other orders  -     Implement Anesthesia Orders Day of Procedure; Standing  -     Obtain Informed Consent; Standing    In regards to history of adenomatous gastric polyp, she is due for upper endoscopy and is agreeable to proceed.  In regards to chronic diarrhea, recommend continue Lomotil when necessary.  She is due for colonoscopy as well so we will arrange for that to be done at same time as upper endoscopy.  Strongly recommend she stop smoking as well, counseled over 3 minutes.    ESOPHAGOGASTRODUODENOSCOPY WITH ANESTHESIA (N/A), COLONOSCOPY WITH ANESTHESIA (N/A)   All risks, benefits, alternatives, and indications of colonoscopy procedure have been discussed with the patient. Risks to include perforation of the colon requiring possible surgery or colostomy, risk of bleeding from biopsies or removal of colon tissue, possibility of missing a colon polyp or cancer, or adverse drug reaction.  Benefits to include the diagnosis and management of disease of the colon and rectum. Alternatives to include barium enema, radiographic evaluation, lab testing or no intervention. Pt verbalizes understanding and agrees.     Risk, benefits, and alternatives of endoscopy were explained in full.  They understand that there is a risk of bleeding, perforation, and infection.  The risk of perforation goes up with esophageal dilation.  Other options to evaluate UGI complaints could involve barium swallow or UGI series, but these would be diagnostic tests only.  Patient was given time to ask questions.  I  answered them to their satisfaction and they are agreeable to proceeding        Body mass index is 32.61 kg/(m^2).         HAILEY Alatorre Dragon/transcription disclaimer:  Much of this encounter note is electronic transcription/translation of spoken language to printed text.  The electronic translation of spoken language may be erroneous, or at times, nonsensical words or phrases may be inadvertently transcribed.  Although I have reviewed the note for such errors, some may still exist.    No results found for this or any previous visit.

## 2017-07-18 NOTE — ANESTHESIA POSTPROCEDURE EVALUATION
"Patient: Valencia Fonseca    Procedure Summary     Date Anesthesia Start Anesthesia Stop Room / Location    07/18/17 1033  [facility] ENDOSCOPY 6 / [facility]       Procedure Diagnosis Surgeon Provider    ESOPHAGOGASTRODUODENOSCOPY WITH ANESTHESIA (N/A Esophagus); COLONOSCOPY WITH ANESTHESIA (N/A ) Chronic diarrhea; History of gastric polyp  (Chronic diarrhea [K52.9]; History of gastric polyp [Z87.19]) MD Sae Ames CRNA          Anesthesia Type: general  Last vitals  BP   126/69 (07/18/17 0956)    Temp   97.2 °F (36.2 °C) (07/18/17 0956)    Pulse   82 (07/18/17 0956)   Resp   20 (07/18/17 0956)    SpO2   96 % (07/18/17 0956)      Post Anesthesia Care and Evaluation    Patient location during evaluation: PHASE II  Patient participation: complete - patient participated  Level of consciousness: awake and alert  Pain score: 0  Pain management: adequate  Airway patency: patent  Anesthetic complications: No anesthetic complications  PONV Status: none  Cardiovascular status: acceptable and stable  Respiratory status: acceptable  Hydration status: acceptable    Comments: Blood pressure 126/69, pulse 82, temperature 97.2 °F (36.2 °C), temperature source Temporal Artery , resp. rate 20, height 66\" (167.6 cm), weight 200 lb (90.7 kg), SpO2 96 %.        "

## 2017-07-18 NOTE — ANESTHESIA PREPROCEDURE EVALUATION
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 In case of emergency avoid paralytics. Careful sedation, increased sensitivity to respiratory depressants. )  intravenous induction   Anesthetic plan and risks discussed with patient.

## 2017-07-18 NOTE — PLAN OF CARE
Problem: Patient Care Overview (Adult)  Goal: Plan of Care Review  Outcome: Outcome(s) achieved Date Met:  07/18/17 07/18/17 1121   Patient Care Overview   Progress progress toward functional goals as expected   Outcome Evaluation   Outcome Summary/Follow up Plan d/c criteria met   Coping/Psychosocial Response Interventions   Plan Of Care Reviewed With patient;healthcare surrogate

## 2017-07-19 LAB — UREASE TISS QL: NEGATIVE

## 2017-07-22 LAB
CYTO UR: NORMAL
LAB AP CASE REPORT: NORMAL
LAB AP CLINICAL INFORMATION: NORMAL
Lab: NORMAL
PATH REPORT.FINAL DX SPEC: NORMAL
PATH REPORT.GROSS SPEC: NORMAL

## 2017-08-01 ENCOUNTER — OFFICE VISIT (OUTPATIENT)
Dept: PRIMARY CARE CLINIC | Age: 52
End: 2017-08-01
Payer: MEDICAID

## 2017-08-01 VITALS
OXYGEN SATURATION: 98 % | BODY MASS INDEX: 28.93 KG/M2 | HEART RATE: 93 BPM | HEIGHT: 66 IN | SYSTOLIC BLOOD PRESSURE: 126 MMHG | TEMPERATURE: 97.2 F | WEIGHT: 180 LBS | DIASTOLIC BLOOD PRESSURE: 72 MMHG

## 2017-08-01 DIAGNOSIS — G71.00 MUSCULAR DYSTROPHY (HCC): ICD-10-CM

## 2017-08-01 DIAGNOSIS — G89.29 CHRONIC FOOT PAIN, RIGHT: ICD-10-CM

## 2017-08-01 DIAGNOSIS — F41.9 ANXIETY: ICD-10-CM

## 2017-08-01 DIAGNOSIS — F32.9 REACTIVE DEPRESSION: ICD-10-CM

## 2017-08-01 DIAGNOSIS — G89.4 CHRONIC PAIN SYNDROME: Primary | ICD-10-CM

## 2017-08-01 DIAGNOSIS — Z91.81 AT HIGH RISK FOR FALLS: ICD-10-CM

## 2017-08-01 DIAGNOSIS — M79.671 CHRONIC FOOT PAIN, RIGHT: ICD-10-CM

## 2017-08-01 PROBLEM — M79.673 CHRONIC FOOT PAIN: Status: ACTIVE | Noted: 2017-08-01

## 2017-08-01 PROCEDURE — 99214 OFFICE O/P EST MOD 30 MIN: CPT | Performed by: FAMILY MEDICINE

## 2017-08-01 RX ORDER — HYDROCODONE BITARTRATE AND ACETAMINOPHEN 10; 325 MG/1; MG/1
1 TABLET ORAL EVERY 8 HOURS PRN
Qty: 90 TABLET | Refills: 0 | Status: SHIPPED | OUTPATIENT
Start: 2017-08-01 | End: 2017-08-25 | Stop reason: SDUPTHER

## 2017-08-01 ASSESSMENT — ENCOUNTER SYMPTOMS
NAUSEA: 0
TROUBLE SWALLOWING: 0
COUGH: 0
CONSTIPATION: 0
SHORTNESS OF BREATH: 0
RHINORRHEA: 0
ABDOMINAL PAIN: 0
SORE THROAT: 0
EYE ITCHING: 0
COLOR CHANGE: 0
DIARRHEA: 0

## 2017-08-11 ENCOUNTER — OFFICE VISIT (OUTPATIENT)
Dept: NEUROLOGY | Age: 52
End: 2017-08-11
Payer: MEDICAID

## 2017-08-11 VITALS
WEIGHT: 200 LBS | BODY MASS INDEX: 32.14 KG/M2 | SYSTOLIC BLOOD PRESSURE: 118 MMHG | DIASTOLIC BLOOD PRESSURE: 76 MMHG | HEIGHT: 66 IN

## 2017-08-11 DIAGNOSIS — M79.671 CHRONIC FOOT PAIN, RIGHT: ICD-10-CM

## 2017-08-11 DIAGNOSIS — G71.11: Primary | ICD-10-CM

## 2017-08-11 DIAGNOSIS — G89.29 CHRONIC FOOT PAIN, RIGHT: ICD-10-CM

## 2017-08-11 DIAGNOSIS — R25.2 CRAMP IN MUSCLE: ICD-10-CM

## 2017-08-11 PROCEDURE — 99214 OFFICE O/P EST MOD 30 MIN: CPT | Performed by: PSYCHIATRY & NEUROLOGY

## 2017-08-11 RX ORDER — DIPHENOXYLATE HYDROCHLORIDE AND ATROPINE SULFATE 2.5; .025 MG/1; MG/1
TABLET ORAL
COMMUNITY
Start: 2017-04-27 | End: 2017-08-11 | Stop reason: SDUPTHER

## 2017-08-17 ENCOUNTER — OFFICE VISIT (OUTPATIENT)
Dept: PRIMARY CARE CLINIC | Age: 52
End: 2017-08-17
Payer: MEDICAID

## 2017-08-17 VITALS
OXYGEN SATURATION: 97 % | BODY MASS INDEX: 32.78 KG/M2 | HEART RATE: 71 BPM | DIASTOLIC BLOOD PRESSURE: 80 MMHG | TEMPERATURE: 97.4 F | HEIGHT: 66 IN | WEIGHT: 204 LBS | SYSTOLIC BLOOD PRESSURE: 126 MMHG

## 2017-08-17 DIAGNOSIS — H65.93 MIDDLE EAR EFFUSION, BILATERAL: Primary | ICD-10-CM

## 2017-08-17 DIAGNOSIS — R42 DIZZINESS: ICD-10-CM

## 2017-08-17 PROCEDURE — 99213 OFFICE O/P EST LOW 20 MIN: CPT | Performed by: NURSE PRACTITIONER

## 2017-08-17 RX ORDER — METHYLPREDNISOLONE 4 MG/1
TABLET ORAL
Qty: 1 KIT | Refills: 0 | Status: SHIPPED | OUTPATIENT
Start: 2017-08-17 | End: 2017-08-23

## 2017-08-17 ASSESSMENT — ENCOUNTER SYMPTOMS
APNEA: 0
WHEEZING: 0
COUGH: 1
GASTROINTESTINAL NEGATIVE: 1
SORE THROAT: 0
SINUS PRESSURE: 1
SHORTNESS OF BREATH: 0
EYES NEGATIVE: 1

## 2017-08-25 ENCOUNTER — OFFICE VISIT (OUTPATIENT)
Dept: PRIMARY CARE CLINIC | Age: 52
End: 2017-08-25
Payer: MEDICAID

## 2017-08-25 VITALS
OXYGEN SATURATION: 97 % | HEIGHT: 66 IN | TEMPERATURE: 97.6 F | DIASTOLIC BLOOD PRESSURE: 87 MMHG | SYSTOLIC BLOOD PRESSURE: 133 MMHG | RESPIRATION RATE: 18 BRPM | HEART RATE: 77 BPM

## 2017-08-25 DIAGNOSIS — J34.89 SINUS PRESSURE: ICD-10-CM

## 2017-08-25 DIAGNOSIS — R42 DIZZINESSES: Primary | ICD-10-CM

## 2017-08-25 DIAGNOSIS — Z76.0 MEDICATION REFILL: ICD-10-CM

## 2017-08-25 PROCEDURE — 99213 OFFICE O/P EST LOW 20 MIN: CPT | Performed by: NURSE PRACTITIONER

## 2017-08-25 RX ORDER — HYDROCODONE BITARTRATE AND ACETAMINOPHEN 10; 325 MG/1; MG/1
1 TABLET ORAL EVERY 8 HOURS PRN
Qty: 90 TABLET | Refills: 0 | Status: SHIPPED | OUTPATIENT
Start: 2017-08-25 | End: 2017-09-14 | Stop reason: SDUPTHER

## 2017-08-28 LAB
ANION GAP SERPL CALCULATED.3IONS-SCNC: 17 MMOL/L (ref 7–19)
BASOPHILS ABSOLUTE: 0.1 K/UL (ref 0–0.2)
BASOPHILS RELATIVE PERCENT: 0.5 % (ref 0–1)
BUN BLDV-MCNC: 17 MG/DL (ref 6–20)
CALCIUM SERPL-MCNC: 9.3 MG/DL (ref 8.6–10)
CHLORIDE BLD-SCNC: 101 MMOL/L (ref 98–111)
CO2: 25 MMOL/L (ref 22–29)
CREAT SERPL-MCNC: 0.3 MG/DL (ref 0.5–0.9)
EOSINOPHILS ABSOLUTE: 0.2 K/UL (ref 0–0.6)
EOSINOPHILS RELATIVE PERCENT: 1.4 % (ref 0–5)
GFR NON-AFRICAN AMERICAN: >60
GLUCOSE BLD-MCNC: 122 MG/DL (ref 74–109)
HCT VFR BLD CALC: 43 % (ref 37–47)
HEMOGLOBIN: 13.8 G/DL (ref 12–16)
LYMPHOCYTES ABSOLUTE: 4 K/UL (ref 1.1–4.5)
LYMPHOCYTES RELATIVE PERCENT: 35.9 % (ref 20–40)
MCH RBC QN AUTO: 30.3 PG (ref 27–31)
MCHC RBC AUTO-ENTMCNC: 32.1 G/DL (ref 33–37)
MCV RBC AUTO: 94.5 FL (ref 81–99)
MONOCYTES ABSOLUTE: 0.6 K/UL (ref 0–0.9)
MONOCYTES RELATIVE PERCENT: 5.8 % (ref 0–10)
NEUTROPHILS ABSOLUTE: 6.2 K/UL (ref 1.5–7.5)
NEUTROPHILS RELATIVE PERCENT: 55.4 % (ref 50–65)
PDW BLD-RTO: 14 % (ref 11.5–14.5)
PLATELET # BLD: 282 K/UL (ref 130–400)
PMV BLD AUTO: 10.2 FL (ref 9.4–12.3)
POTASSIUM SERPL-SCNC: 4 MMOL/L (ref 3.5–5)
RBC # BLD: 4.55 M/UL (ref 4.2–5.4)
SODIUM BLD-SCNC: 143 MMOL/L (ref 136–145)
WBC # BLD: 11.1 K/UL (ref 4.8–10.8)

## 2017-08-30 RX ORDER — SERTRALINE HYDROCHLORIDE 100 MG/1
100 TABLET, FILM COATED ORAL DAILY
COMMUNITY
End: 2019-07-24 | Stop reason: HOSPADM

## 2017-08-30 RX ORDER — HYDROCODONE BITARTRATE AND ACETAMINOPHEN 10; 325 MG/1; MG/1
1 TABLET ORAL EVERY 6 HOURS PRN
COMMUNITY
End: 2020-10-01

## 2017-08-31 ENCOUNTER — HOSPITAL ENCOUNTER (OUTPATIENT)
Facility: HOSPITAL | Age: 52
Setting detail: HOSPITAL OUTPATIENT SURGERY
Discharge: HOME | End: 2017-08-31
Attending: OPHTHALMOLOGY | Admitting: OPHTHALMOLOGY

## 2017-08-31 ENCOUNTER — ANESTHESIA (OUTPATIENT)
Dept: PERIOP | Facility: HOSPITAL | Age: 52
End: 2017-08-31

## 2017-08-31 ENCOUNTER — ANESTHESIA EVENT (OUTPATIENT)
Dept: PERIOP | Facility: HOSPITAL | Age: 52
End: 2017-08-31

## 2017-08-31 VITALS
SYSTOLIC BLOOD PRESSURE: 139 MMHG | HEIGHT: 66 IN | OXYGEN SATURATION: 96 % | RESPIRATION RATE: 16 BRPM | HEART RATE: 82 BPM | DIASTOLIC BLOOD PRESSURE: 78 MMHG | TEMPERATURE: 97.8 F

## 2017-08-31 PROCEDURE — 93010 ELECTROCARDIOGRAM REPORT: CPT | Performed by: INTERNAL MEDICINE

## 2017-08-31 PROCEDURE — 25010000002 ONDANSETRON PER 1 MG: Performed by: NURSE ANESTHETIST, CERTIFIED REGISTERED

## 2017-08-31 PROCEDURE — 25010000002 FENTANYL CITRATE (PF) 100 MCG/2ML SOLUTION

## 2017-08-31 PROCEDURE — 25010000002 MIDAZOLAM PER 1 MG: Performed by: ANESTHESIOLOGY

## 2017-08-31 PROCEDURE — 25010000002 PROPOFOL 10 MG/ML EMULSION: Performed by: NURSE ANESTHETIST, CERTIFIED REGISTERED

## 2017-08-31 PROCEDURE — 25010000002 FENTANYL CITRATE (PF) 100 MCG/2ML SOLUTION: Performed by: NURSE ANESTHETIST, CERTIFIED REGISTERED

## 2017-08-31 PROCEDURE — 93005 ELECTROCARDIOGRAM TRACING: CPT | Performed by: ANESTHESIOLOGY

## 2017-08-31 PROCEDURE — 25010000002 FENTANYL CITRATE (PF) 100 MCG/2ML SOLUTION: Performed by: ANESTHESIOLOGY

## 2017-08-31 RX ORDER — ACETAMINOPHEN 325 MG/1
650 TABLET ORAL ONCE
Status: COMPLETED | OUTPATIENT
Start: 2017-08-31 | End: 2017-08-31

## 2017-08-31 RX ORDER — MAGNESIUM HYDROXIDE 1200 MG/15ML
LIQUID ORAL AS NEEDED
Status: DISCONTINUED | OUTPATIENT
Start: 2017-08-31 | End: 2017-08-31 | Stop reason: HOSPADM

## 2017-08-31 RX ORDER — PROMETHAZINE HYDROCHLORIDE 25 MG/ML
12.5 INJECTION, SOLUTION INTRAMUSCULAR; INTRAVENOUS ONCE AS NEEDED
Status: DISCONTINUED | OUTPATIENT
Start: 2017-08-31 | End: 2017-08-31 | Stop reason: HOSPADM

## 2017-08-31 RX ORDER — HYDROCODONE BITARTRATE AND ACETAMINOPHEN 7.5; 325 MG/1; MG/1
1 TABLET ORAL ONCE AS NEEDED
Status: COMPLETED | OUTPATIENT
Start: 2017-08-31 | End: 2017-08-31

## 2017-08-31 RX ORDER — SODIUM CHLORIDE, SODIUM LACTATE, POTASSIUM CHLORIDE, CALCIUM CHLORIDE 600; 310; 30; 20 MG/100ML; MG/100ML; MG/100ML; MG/100ML
9 INJECTION, SOLUTION INTRAVENOUS CONTINUOUS
Status: DISCONTINUED | OUTPATIENT
Start: 2017-08-31 | End: 2017-08-31 | Stop reason: HOSPADM

## 2017-08-31 RX ORDER — HYDRALAZINE HYDROCHLORIDE 20 MG/ML
5 INJECTION INTRAMUSCULAR; INTRAVENOUS
Status: DISCONTINUED | OUTPATIENT
Start: 2017-08-31 | End: 2017-08-31 | Stop reason: HOSPADM

## 2017-08-31 RX ORDER — PROPOFOL 10 MG/ML
VIAL (ML) INTRAVENOUS AS NEEDED
Status: DISCONTINUED | OUTPATIENT
Start: 2017-08-31 | End: 2017-08-31 | Stop reason: SURG

## 2017-08-31 RX ORDER — SODIUM CHLORIDE 0.9 % (FLUSH) 0.9 %
1-10 SYRINGE (ML) INJECTION AS NEEDED
Status: DISCONTINUED | OUTPATIENT
Start: 2017-08-31 | End: 2017-08-31 | Stop reason: HOSPADM

## 2017-08-31 RX ORDER — ACETAMINOPHEN 325 MG/1
650 TABLET ORAL ONCE AS NEEDED
Status: DISCONTINUED | OUTPATIENT
Start: 2017-08-31 | End: 2017-08-31

## 2017-08-31 RX ORDER — FAMOTIDINE 10 MG/ML
20 INJECTION, SOLUTION INTRAVENOUS ONCE
Status: COMPLETED | OUTPATIENT
Start: 2017-08-31 | End: 2017-08-31

## 2017-08-31 RX ORDER — NALOXONE HCL 0.4 MG/ML
0.4 VIAL (ML) INJECTION AS NEEDED
Status: DISCONTINUED | OUTPATIENT
Start: 2017-08-31 | End: 2017-08-31 | Stop reason: SDUPTHER

## 2017-08-31 RX ORDER — HYDROCODONE BITARTRATE AND ACETAMINOPHEN 7.5; 325 MG/1; MG/1
TABLET ORAL
Status: COMPLETED
Start: 2017-08-31 | End: 2017-08-31

## 2017-08-31 RX ORDER — ACETAMINOPHEN 650 MG/1
650 SUPPOSITORY RECTAL ONCE AS NEEDED
Status: DISCONTINUED | OUTPATIENT
Start: 2017-08-31 | End: 2017-08-31

## 2017-08-31 RX ORDER — EPHEDRINE SULFATE 50 MG/ML
5 INJECTION, SOLUTION INTRAVENOUS ONCE AS NEEDED
Status: DISCONTINUED | OUTPATIENT
Start: 2017-08-31 | End: 2017-08-31 | Stop reason: HOSPADM

## 2017-08-31 RX ORDER — PROMETHAZINE HYDROCHLORIDE 25 MG/1
25 TABLET ORAL ONCE AS NEEDED
Status: DISCONTINUED | OUTPATIENT
Start: 2017-08-31 | End: 2017-08-31 | Stop reason: HOSPADM

## 2017-08-31 RX ORDER — PROMETHAZINE HYDROCHLORIDE 25 MG/1
25 SUPPOSITORY RECTAL ONCE AS NEEDED
Status: DISCONTINUED | OUTPATIENT
Start: 2017-08-31 | End: 2017-08-31 | Stop reason: HOSPADM

## 2017-08-31 RX ORDER — NALOXONE HCL 0.4 MG/ML
0.2 VIAL (ML) INJECTION AS NEEDED
Status: DISCONTINUED | OUTPATIENT
Start: 2017-08-31 | End: 2017-08-31 | Stop reason: HOSPADM

## 2017-08-31 RX ORDER — MIDAZOLAM HYDROCHLORIDE 1 MG/ML
1 INJECTION INTRAMUSCULAR; INTRAVENOUS
Status: DISCONTINUED | OUTPATIENT
Start: 2017-08-31 | End: 2017-08-31 | Stop reason: HOSPADM

## 2017-08-31 RX ORDER — MIDAZOLAM HYDROCHLORIDE 1 MG/ML
2 INJECTION INTRAMUSCULAR; INTRAVENOUS
Status: DISCONTINUED | OUTPATIENT
Start: 2017-08-31 | End: 2017-08-31 | Stop reason: HOSPADM

## 2017-08-31 RX ORDER — DIPHENHYDRAMINE HYDROCHLORIDE 50 MG/ML
12.5 INJECTION INTRAMUSCULAR; INTRAVENOUS
Status: DISCONTINUED | OUTPATIENT
Start: 2017-08-31 | End: 2017-08-31 | Stop reason: HOSPADM

## 2017-08-31 RX ORDER — PROMETHAZINE HYDROCHLORIDE 25 MG/ML
6.25 INJECTION, SOLUTION INTRAMUSCULAR; INTRAVENOUS ONCE AS NEEDED
Status: DISCONTINUED | OUTPATIENT
Start: 2017-08-31 | End: 2017-08-31 | Stop reason: SDUPTHER

## 2017-08-31 RX ORDER — LABETALOL HYDROCHLORIDE 5 MG/ML
5 INJECTION, SOLUTION INTRAVENOUS
Status: DISCONTINUED | OUTPATIENT
Start: 2017-08-31 | End: 2017-08-31 | Stop reason: HOSPADM

## 2017-08-31 RX ORDER — PROMETHAZINE HYDROCHLORIDE 25 MG/1
12.5 TABLET ORAL ONCE AS NEEDED
Status: DISCONTINUED | OUTPATIENT
Start: 2017-08-31 | End: 2017-08-31 | Stop reason: HOSPADM

## 2017-08-31 RX ORDER — ERYTHROMYCIN 5 MG/G
OINTMENT OPHTHALMIC AS NEEDED
Status: DISCONTINUED | OUTPATIENT
Start: 2017-08-31 | End: 2017-08-31 | Stop reason: HOSPADM

## 2017-08-31 RX ORDER — EPHEDRINE SULFATE 50 MG/ML
INJECTION, SOLUTION INTRAVENOUS AS NEEDED
Status: DISCONTINUED | OUTPATIENT
Start: 2017-08-31 | End: 2017-08-31 | Stop reason: SURG

## 2017-08-31 RX ORDER — PROMETHAZINE HYDROCHLORIDE 25 MG/1
25 TABLET ORAL ONCE AS NEEDED
Status: DISCONTINUED | OUTPATIENT
Start: 2017-08-31 | End: 2017-08-31 | Stop reason: SDUPTHER

## 2017-08-31 RX ORDER — ONDANSETRON 2 MG/ML
INJECTION INTRAMUSCULAR; INTRAVENOUS AS NEEDED
Status: DISCONTINUED | OUTPATIENT
Start: 2017-08-31 | End: 2017-08-31 | Stop reason: SURG

## 2017-08-31 RX ORDER — DIPHENHYDRAMINE HYDROCHLORIDE 50 MG/ML
12.5 INJECTION INTRAMUSCULAR; INTRAVENOUS
Status: DISCONTINUED | OUTPATIENT
Start: 2017-08-31 | End: 2017-08-31 | Stop reason: SDUPTHER

## 2017-08-31 RX ORDER — HYDRALAZINE HYDROCHLORIDE 20 MG/ML
5 INJECTION INTRAMUSCULAR; INTRAVENOUS
Status: DISCONTINUED | OUTPATIENT
Start: 2017-08-31 | End: 2017-08-31 | Stop reason: SDUPTHER

## 2017-08-31 RX ORDER — PROMETHAZINE HYDROCHLORIDE 25 MG/1
25 SUPPOSITORY RECTAL ONCE AS NEEDED
Status: DISCONTINUED | OUTPATIENT
Start: 2017-08-31 | End: 2017-08-31 | Stop reason: SDUPTHER

## 2017-08-31 RX ORDER — ERYTHROMYCIN 5 MG/G
OINTMENT OPHTHALMIC 2 TIMES DAILY
Qty: 3.5 G | Refills: 0 | Status: SHIPPED | OUTPATIENT
Start: 2017-08-31 | End: 2017-09-10

## 2017-08-31 RX ORDER — FENTANYL CITRATE 50 UG/ML
INJECTION, SOLUTION INTRAMUSCULAR; INTRAVENOUS
Status: COMPLETED
Start: 2017-08-31 | End: 2017-08-31

## 2017-08-31 RX ORDER — ONDANSETRON 2 MG/ML
4 INJECTION INTRAMUSCULAR; INTRAVENOUS ONCE AS NEEDED
Status: DISCONTINUED | OUTPATIENT
Start: 2017-08-31 | End: 2017-08-31 | Stop reason: HOSPADM

## 2017-08-31 RX ORDER — HYDROMORPHONE HYDROCHLORIDE 1 MG/ML
0.5 INJECTION, SOLUTION INTRAMUSCULAR; INTRAVENOUS; SUBCUTANEOUS
Status: DISCONTINUED | OUTPATIENT
Start: 2017-08-31 | End: 2017-08-31 | Stop reason: HOSPADM

## 2017-08-31 RX ORDER — FENTANYL CITRATE 50 UG/ML
50 INJECTION, SOLUTION INTRAMUSCULAR; INTRAVENOUS
Status: DISCONTINUED | OUTPATIENT
Start: 2017-08-31 | End: 2017-08-31 | Stop reason: HOSPADM

## 2017-08-31 RX ORDER — HYDROCODONE BITARTRATE AND ACETAMINOPHEN 10; 325 MG/1; MG/1
1 TABLET ORAL EVERY 4 HOURS PRN
Qty: 15 TABLET | Refills: 0 | Status: SHIPPED | OUTPATIENT
Start: 2017-08-31 | End: 2018-08-28 | Stop reason: SDUPTHER

## 2017-08-31 RX ORDER — FENTANYL CITRATE 50 UG/ML
50 INJECTION, SOLUTION INTRAMUSCULAR; INTRAVENOUS
Status: DISCONTINUED | OUTPATIENT
Start: 2017-08-31 | End: 2017-08-31 | Stop reason: SDUPTHER

## 2017-08-31 RX ORDER — LIDOCAINE HYDROCHLORIDE 10 MG/ML
0.5 INJECTION, SOLUTION EPIDURAL; INFILTRATION; INTRACAUDAL; PERINEURAL ONCE AS NEEDED
Status: DISCONTINUED | OUTPATIENT
Start: 2017-08-31 | End: 2017-08-31 | Stop reason: HOSPADM

## 2017-08-31 RX ORDER — FLUMAZENIL 0.1 MG/ML
0.2 INJECTION INTRAVENOUS AS NEEDED
Status: DISCONTINUED | OUTPATIENT
Start: 2017-08-31 | End: 2017-08-31 | Stop reason: HOSPADM

## 2017-08-31 RX ORDER — LIDOCAINE HYDROCHLORIDE 20 MG/ML
INJECTION, SOLUTION INFILTRATION; PERINEURAL AS NEEDED
Status: DISCONTINUED | OUTPATIENT
Start: 2017-08-31 | End: 2017-08-31 | Stop reason: SURG

## 2017-08-31 RX ORDER — FENTANYL CITRATE 50 UG/ML
INJECTION, SOLUTION INTRAMUSCULAR; INTRAVENOUS AS NEEDED
Status: DISCONTINUED | OUTPATIENT
Start: 2017-08-31 | End: 2017-08-31 | Stop reason: SURG

## 2017-08-31 RX ADMIN — ACETAMINOPHEN 650 MG: 325 TABLET ORAL at 09:52

## 2017-08-31 RX ADMIN — FAMOTIDINE 20 MG: 10 INJECTION, SOLUTION INTRAVENOUS at 09:53

## 2017-08-31 RX ADMIN — LIDOCAINE HYDROCHLORIDE 60 MG: 20 INJECTION, SOLUTION INFILTRATION; PERINEURAL at 10:09

## 2017-08-31 RX ADMIN — EPHEDRINE SULFATE 10 MG: 50 INJECTION INTRAMUSCULAR; INTRAVENOUS; SUBCUTANEOUS at 11:34

## 2017-08-31 RX ADMIN — FENTANYL CITRATE 50 MCG: 50 INJECTION INTRAMUSCULAR; INTRAVENOUS at 10:06

## 2017-08-31 RX ADMIN — SODIUM CHLORIDE, POTASSIUM CHLORIDE, SODIUM LACTATE AND CALCIUM CHLORIDE: 600; 310; 30; 20 INJECTION, SOLUTION INTRAVENOUS at 12:14

## 2017-08-31 RX ADMIN — HYDROCODONE BITARTRATE AND ACETAMINOPHEN 1 TABLET: 7.5; 325 TABLET ORAL at 14:19

## 2017-08-31 RX ADMIN — PROPOFOL 150 MG: 10 INJECTION, EMULSION INTRAVENOUS at 10:09

## 2017-08-31 RX ADMIN — ONDANSETRON 4 MG: 2 INJECTION INTRAMUSCULAR; INTRAVENOUS at 11:19

## 2017-08-31 RX ADMIN — MIDAZOLAM 1 MG: 1 INJECTION INTRAMUSCULAR; INTRAVENOUS at 09:54

## 2017-08-31 RX ADMIN — SODIUM CHLORIDE, POTASSIUM CHLORIDE, SODIUM LACTATE AND CALCIUM CHLORIDE 9 ML/HR: 600; 310; 30; 20 INJECTION, SOLUTION INTRAVENOUS at 09:50

## 2017-08-31 RX ADMIN — FENTANYL CITRATE 50 MCG: 50 INJECTION INTRAMUSCULAR; INTRAVENOUS at 13:28

## 2017-08-31 RX ADMIN — FENTANYL CITRATE 50 MCG: 50 INJECTION INTRAMUSCULAR; INTRAVENOUS at 13:10

## 2017-08-31 RX ADMIN — FENTANYL CITRATE 50 MCG: 50 INJECTION INTRAMUSCULAR; INTRAVENOUS at 11:23

## 2017-09-03 ENCOUNTER — APPOINTMENT (OUTPATIENT)
Dept: CT IMAGING | Age: 52
DRG: 189 | End: 2017-09-03
Payer: MEDICAID

## 2017-09-03 ENCOUNTER — APPOINTMENT (OUTPATIENT)
Dept: GENERAL RADIOLOGY | Age: 52
DRG: 189 | End: 2017-09-03
Payer: MEDICAID

## 2017-09-03 ENCOUNTER — HOSPITAL ENCOUNTER (INPATIENT)
Age: 52
LOS: 3 days | Discharge: HOME | DRG: 189 | End: 2017-09-06
Attending: EMERGENCY MEDICINE | Admitting: HOSPITALIST
Payer: MEDICAID

## 2017-09-03 DIAGNOSIS — R41.0 CONFUSION: ICD-10-CM

## 2017-09-03 DIAGNOSIS — J18.9 PNEUMONIA DUE TO ORGANISM: Primary | ICD-10-CM

## 2017-09-03 PROBLEM — H01.9: Status: ACTIVE | Noted: 2017-09-03

## 2017-09-03 PROBLEM — S00.209A: Status: ACTIVE | Noted: 2017-09-03

## 2017-09-03 LAB
ALBUMIN SERPL-MCNC: 3.4 G/DL (ref 3.5–5.2)
ALP BLD-CCNC: 168 U/L (ref 35–104)
ALT SERPL-CCNC: 72 U/L (ref 5–33)
AMPHETAMINE SCREEN, URINE: NEGATIVE
ANION GAP SERPL CALCULATED.3IONS-SCNC: 13 MMOL/L (ref 7–19)
AST SERPL-CCNC: 50 U/L (ref 5–32)
BARBITURATE SCREEN URINE: NEGATIVE
BASE EXCESS ARTERIAL: 11.4 MMOL/L (ref -2–2)
BASE EXCESS ARTERIAL: 12 MMOL/L (ref -2–2)
BENZODIAZEPINE SCREEN, URINE: NEGATIVE
BILIRUB SERPL-MCNC: 0.5 MG/DL (ref 0.2–1.2)
BILIRUBIN URINE: NEGATIVE
BLOOD, URINE: NEGATIVE
BUN BLDV-MCNC: 5 MG/DL (ref 6–20)
CALCIUM SERPL-MCNC: 9.6 MG/DL (ref 8.6–10)
CANNABINOID SCREEN URINE: POSITIVE
CARBOXYHEMOGLOBIN ARTERIAL: 3.6 % (ref 0–5)
CARBOXYHEMOGLOBIN ARTERIAL: 5 % (ref 0–5)
CHLORIDE BLD-SCNC: 97 MMOL/L (ref 98–111)
CLARITY: CLEAR
CO2: 31 MMOL/L (ref 22–29)
COCAINE METABOLITE SCREEN URINE: NEGATIVE
COLOR: YELLOW
CREAT SERPL-MCNC: 0.3 MG/DL (ref 0.5–0.9)
GFR NON-AFRICAN AMERICAN: >60
GLUCOSE BLD-MCNC: 115 MG/DL (ref 74–109)
GLUCOSE BLD-MCNC: 126 MG/DL (ref 70–99)
GLUCOSE URINE: NEGATIVE MG/DL
HCO3 ARTERIAL: 34.7 MMOL/L (ref 22–26)
HCO3 ARTERIAL: 37.5 MMOL/L (ref 22–26)
HCT VFR BLD CALC: 44.8 % (ref 37–47)
HEMOGLOBIN, ART, EXTENDED: 13.6 G/DL (ref 12–16)
HEMOGLOBIN, ART, EXTENDED: 13.7 G/DL (ref 12–16)
HEMOGLOBIN: 14.1 G/DL (ref 12–16)
KETONES, URINE: NEGATIVE MG/DL
LACTIC ACID: 0.7 MG/DL (ref 0.5–1.9)
LEUKOCYTE ESTERASE, URINE: NEGATIVE
Lab: ABNORMAL
MCH RBC QN AUTO: 30.1 PG (ref 27–31)
MCHC RBC AUTO-ENTMCNC: 31.5 G/DL (ref 33–37)
MCV RBC AUTO: 95.7 FL (ref 81–99)
METHEMOGLOBIN ARTERIAL: 0.7 %
METHEMOGLOBIN ARTERIAL: 1 %
NITRITE, URINE: NEGATIVE
O2 CONTENT ARTERIAL: 17.5 ML/DL
O2 CONTENT ARTERIAL: 17.8 ML/DL
O2 SAT, ARTERIAL: 90.9 %
O2 SAT, ARTERIAL: 92.6 %
O2 THERAPY: ABNORMAL
O2 THERAPY: ABNORMAL
OPIATE SCREEN URINE: NEGATIVE
PCO2 ARTERIAL: 37 MMHG (ref 35–45)
PCO2 ARTERIAL: 54 MMHG (ref 35–45)
PDW BLD-RTO: 14.2 % (ref 11.5–14.5)
PERFORMED ON: ABNORMAL
PERFORMED ON: NORMAL
PH ARTERIAL: 7.45 (ref 7.35–7.45)
PH ARTERIAL: 7.58 (ref 7.35–7.45)
PH UA: 8
PLATELET # BLD: 282 K/UL (ref 130–400)
PMV BLD AUTO: 10 FL (ref 9.4–12.3)
PO2 ARTERIAL: 63 MMHG (ref 80–100)
PO2 ARTERIAL: 78 MMHG (ref 80–100)
POC TROPONIN I: 0.05 NG/ML (ref 0–0.08)
POTASSIUM SERPL-SCNC: 3.5 MMOL/L (ref 3.5–5)
POTASSIUM, WHOLE BLOOD: 3.4
POTASSIUM, WHOLE BLOOD: 3.7
PROTEIN UA: NEGATIVE MG/DL
RBC # BLD: 4.68 M/UL (ref 4.2–5.4)
SODIUM BLD-SCNC: 141 MMOL/L (ref 136–145)
SPECIFIC GRAVITY UA: 1.01
TOTAL PROTEIN: 8 G/DL (ref 6.6–8.7)
UROBILINOGEN, URINE: 1 E.U./DL
WBC # BLD: 10.6 K/UL (ref 4.8–10.8)

## 2017-09-03 PROCEDURE — 99285 EMERGENCY DEPT VISIT HI MDM: CPT

## 2017-09-03 PROCEDURE — 82803 BLOOD GASES ANY COMBINATION: CPT

## 2017-09-03 PROCEDURE — 84132 ASSAY OF SERUM POTASSIUM: CPT

## 2017-09-03 PROCEDURE — 36600 WITHDRAWAL OF ARTERIAL BLOOD: CPT

## 2017-09-03 PROCEDURE — 6360000002 HC RX W HCPCS: Performed by: INTERNAL MEDICINE

## 2017-09-03 PROCEDURE — 80307 DRUG TEST PRSMV CHEM ANLYZR: CPT

## 2017-09-03 PROCEDURE — 93005 ELECTROCARDIOGRAM TRACING: CPT

## 2017-09-03 PROCEDURE — 6360000002 HC RX W HCPCS: Performed by: EMERGENCY MEDICINE

## 2017-09-03 PROCEDURE — 82948 REAGENT STRIP/BLOOD GLUCOSE: CPT

## 2017-09-03 PROCEDURE — 94640 AIRWAY INHALATION TREATMENT: CPT

## 2017-09-03 PROCEDURE — 84484 ASSAY OF TROPONIN QUANT: CPT

## 2017-09-03 PROCEDURE — 87086 URINE CULTURE/COLONY COUNT: CPT

## 2017-09-03 PROCEDURE — 1210000000 HC MED SURG R&B

## 2017-09-03 PROCEDURE — 2700000000 HC OXYGEN THERAPY PER DAY

## 2017-09-03 PROCEDURE — 2580000003 HC RX 258: Performed by: EMERGENCY MEDICINE

## 2017-09-03 PROCEDURE — 70450 CT HEAD/BRAIN W/O DYE: CPT

## 2017-09-03 PROCEDURE — 99285 EMERGENCY DEPT VISIT HI MDM: CPT | Performed by: EMERGENCY MEDICINE

## 2017-09-03 PROCEDURE — 99223 1ST HOSP IP/OBS HIGH 75: CPT | Performed by: INTERNAL MEDICINE

## 2017-09-03 PROCEDURE — 87040 BLOOD CULTURE FOR BACTERIA: CPT

## 2017-09-03 PROCEDURE — 85027 COMPLETE CBC AUTOMATED: CPT

## 2017-09-03 PROCEDURE — 71020 XR CHEST STANDARD TWO VW: CPT

## 2017-09-03 PROCEDURE — 83605 ASSAY OF LACTIC ACID: CPT

## 2017-09-03 PROCEDURE — 80053 COMPREHEN METABOLIC PANEL: CPT

## 2017-09-03 PROCEDURE — 96374 THER/PROPH/DIAG INJ IV PUSH: CPT

## 2017-09-03 PROCEDURE — 36415 COLL VENOUS BLD VENIPUNCTURE: CPT

## 2017-09-03 RX ORDER — SODIUM CHLORIDE 9 MG/ML
INJECTION, SOLUTION INTRAVENOUS CONTINUOUS
Status: DISCONTINUED | OUTPATIENT
Start: 2017-09-03 | End: 2017-09-04

## 2017-09-03 RX ORDER — SIMVASTATIN 80 MG
80 TABLET ORAL DAILY
COMMUNITY
End: 2017-10-11

## 2017-09-03 RX ORDER — METHYLPREDNISOLONE SODIUM SUCCINATE 40 MG/ML
40 INJECTION, POWDER, LYOPHILIZED, FOR SOLUTION INTRAMUSCULAR; INTRAVENOUS EVERY 12 HOURS
Status: DISCONTINUED | OUTPATIENT
Start: 2017-09-04 | End: 2017-09-06 | Stop reason: HOSPADM

## 2017-09-03 RX ORDER — ONDANSETRON 2 MG/ML
4 INJECTION INTRAMUSCULAR; INTRAVENOUS EVERY 6 HOURS PRN
Status: DISCONTINUED | OUTPATIENT
Start: 2017-09-03 | End: 2017-09-03 | Stop reason: SDUPTHER

## 2017-09-03 RX ORDER — DOCUSATE SODIUM 100 MG/1
100 CAPSULE, LIQUID FILLED ORAL 2 TIMES DAILY
Status: DISCONTINUED | OUTPATIENT
Start: 2017-09-03 | End: 2017-09-03 | Stop reason: SDUPTHER

## 2017-09-03 RX ORDER — ONDANSETRON 2 MG/ML
4 INJECTION INTRAMUSCULAR; INTRAVENOUS EVERY 6 HOURS PRN
Status: DISCONTINUED | OUTPATIENT
Start: 2017-09-03 | End: 2017-09-06 | Stop reason: HOSPADM

## 2017-09-03 RX ORDER — DIPHENOXYLATE HYDROCHLORIDE AND ATROPINE SULFATE 2.5; .025 MG/1; MG/1
1 TABLET ORAL 4 TIMES DAILY PRN
COMMUNITY
End: 2018-02-01 | Stop reason: SDUPTHER

## 2017-09-03 RX ORDER — GUAIFENESIN/DEXTROMETHORPHAN 100-10MG/5
5 SYRUP ORAL EVERY 4 HOURS PRN
Status: DISCONTINUED | OUTPATIENT
Start: 2017-09-03 | End: 2017-09-06 | Stop reason: HOSPADM

## 2017-09-03 RX ORDER — SODIUM CHLORIDE 0.9 % (FLUSH) 0.9 %
10 SYRINGE (ML) INJECTION EVERY 12 HOURS SCHEDULED
Status: DISCONTINUED | OUTPATIENT
Start: 2017-09-03 | End: 2017-09-06 | Stop reason: HOSPADM

## 2017-09-03 RX ORDER — DEXAMETHASONE SODIUM PHOSPHATE 1 MG/ML
1 SOLUTION/ DROPS OPHTHALMIC 3 TIMES DAILY
Status: DISCONTINUED | OUTPATIENT
Start: 2017-09-03 | End: 2017-09-03 | Stop reason: ALTCHOICE

## 2017-09-03 RX ORDER — SERTRALINE HYDROCHLORIDE 100 MG/1
150 TABLET, FILM COATED ORAL DAILY
Status: DISCONTINUED | OUTPATIENT
Start: 2017-09-04 | End: 2017-09-06 | Stop reason: HOSPADM

## 2017-09-03 RX ORDER — DOCUSATE SODIUM 100 MG/1
100 CAPSULE, LIQUID FILLED ORAL 2 TIMES DAILY
Status: DISCONTINUED | OUTPATIENT
Start: 2017-09-03 | End: 2017-09-06 | Stop reason: HOSPADM

## 2017-09-03 RX ORDER — SODIUM CHLORIDE 0.9 % (FLUSH) 0.9 %
10 SYRINGE (ML) INJECTION PRN
Status: DISCONTINUED | OUTPATIENT
Start: 2017-09-03 | End: 2017-09-06 | Stop reason: HOSPADM

## 2017-09-03 RX ORDER — IPRATROPIUM BROMIDE AND ALBUTEROL SULFATE 2.5; .5 MG/3ML; MG/3ML
1 SOLUTION RESPIRATORY (INHALATION)
Status: DISCONTINUED | OUTPATIENT
Start: 2017-09-04 | End: 2017-09-06 | Stop reason: HOSPADM

## 2017-09-03 RX ORDER — NALOXONE HYDROCHLORIDE 0.4 MG/ML
0.2 INJECTION, SOLUTION INTRAMUSCULAR; INTRAVENOUS; SUBCUTANEOUS ONCE
Status: COMPLETED | OUTPATIENT
Start: 2017-09-03 | End: 2017-09-03

## 2017-09-03 RX ORDER — CIPROFLOXACIN HYDROCHLORIDE 3.5 MG/ML
1 SOLUTION/ DROPS TOPICAL 3 TIMES DAILY
Status: DISCONTINUED | OUTPATIENT
Start: 2017-09-03 | End: 2017-09-03 | Stop reason: ALTCHOICE

## 2017-09-03 RX ORDER — NICOTINE 21 MG/24HR
1 PATCH, TRANSDERMAL 24 HOURS TRANSDERMAL DAILY
Status: DISCONTINUED | OUTPATIENT
Start: 2017-09-04 | End: 2017-09-06 | Stop reason: HOSPADM

## 2017-09-03 RX ADMIN — AZITHROMYCIN MONOHYDRATE 500 MG: 500 INJECTION, POWDER, LYOPHILIZED, FOR SOLUTION INTRAVENOUS at 20:37

## 2017-09-03 RX ADMIN — NALOXONE HYDROCHLORIDE 0.2 MG: 0.4 INJECTION, SOLUTION INTRAMUSCULAR; INTRAVENOUS; SUBCUTANEOUS at 19:15

## 2017-09-03 RX ADMIN — ALBUTEROL SULFATE 2.5 MG: 2.5 SOLUTION RESPIRATORY (INHALATION) at 23:01

## 2017-09-03 ASSESSMENT — ENCOUNTER SYMPTOMS
EYE PAIN: 0
SHORTNESS OF BREATH: 0
DIARRHEA: 0
COUGH: 1
VOMITING: 0
ABDOMINAL PAIN: 0

## 2017-09-04 ENCOUNTER — APPOINTMENT (OUTPATIENT)
Dept: GENERAL RADIOLOGY | Age: 52
DRG: 189 | End: 2017-09-04
Payer: MEDICAID

## 2017-09-04 PROBLEM — Z98.890: Status: ACTIVE | Noted: 2017-09-04

## 2017-09-04 PROBLEM — J96.01 ACUTE RESPIRATORY FAILURE WITH HYPOXIA AND HYPERCAPNIA (HCC): Status: ACTIVE | Noted: 2017-09-04

## 2017-09-04 PROBLEM — G93.41 ENCEPHALOPATHY, METABOLIC: Status: ACTIVE | Noted: 2017-09-04

## 2017-09-04 PROBLEM — E87.6 HYPOKALEMIA: Status: ACTIVE | Noted: 2017-09-04

## 2017-09-04 PROBLEM — J96.02 ACUTE RESPIRATORY FAILURE WITH HYPOXIA AND HYPERCAPNIA (HCC): Status: ACTIVE | Noted: 2017-09-04

## 2017-09-04 PROBLEM — J18.9 PNEUMONIA OF LEFT LOWER LOBE DUE TO INFECTIOUS ORGANISM: Status: ACTIVE | Noted: 2017-09-04

## 2017-09-04 PROBLEM — H02.403 ACQUIRED PTOSIS OF BOTH EYELIDS: Status: ACTIVE | Noted: 2017-09-04

## 2017-09-04 LAB
ANION GAP SERPL CALCULATED.3IONS-SCNC: 17 MMOL/L (ref 7–19)
BASOPHILS ABSOLUTE: 0 K/UL (ref 0–0.2)
BASOPHILS RELATIVE PERCENT: 0.5 % (ref 0–1)
BUN BLDV-MCNC: 4 MG/DL (ref 6–20)
CALCIUM SERPL-MCNC: 9.5 MG/DL (ref 8.6–10)
CHLORIDE BLD-SCNC: 100 MMOL/L (ref 98–111)
CO2: 28 MMOL/L (ref 22–29)
CREAT SERPL-MCNC: 0.3 MG/DL (ref 0.5–0.9)
EOSINOPHILS ABSOLUTE: 0 K/UL (ref 0–0.6)
EOSINOPHILS RELATIVE PERCENT: 0.4 % (ref 0–5)
GFR NON-AFRICAN AMERICAN: >60
GLUCOSE BLD-MCNC: 129 MG/DL (ref 74–109)
GLUCOSE BLD-MCNC: 140 MG/DL (ref 70–99)
HCT VFR BLD CALC: 45.5 % (ref 37–47)
HEMOGLOBIN: 14.2 G/DL (ref 12–16)
LYMPHOCYTES ABSOLUTE: 1.3 K/UL (ref 1.1–4.5)
LYMPHOCYTES RELATIVE PERCENT: 14.9 % (ref 20–40)
MCH RBC QN AUTO: 30.6 PG (ref 27–31)
MCHC RBC AUTO-ENTMCNC: 31.2 G/DL (ref 33–37)
MCV RBC AUTO: 98.1 FL (ref 81–99)
MONOCYTES ABSOLUTE: 0.4 K/UL (ref 0–0.9)
MONOCYTES RELATIVE PERCENT: 4.6 % (ref 0–10)
NEUTROPHILS ABSOLUTE: 6.7 K/UL (ref 1.5–7.5)
NEUTROPHILS RELATIVE PERCENT: 78.9 % (ref 50–65)
PDW BLD-RTO: 14.1 % (ref 11.5–14.5)
PERFORMED ON: ABNORMAL
PLATELET # BLD: 262 K/UL (ref 130–400)
PMV BLD AUTO: 9.7 FL (ref 9.4–12.3)
POTASSIUM SERPL-SCNC: 3.3 MMOL/L (ref 3.5–5)
RBC # BLD: 4.64 M/UL (ref 4.2–5.4)
SODIUM BLD-SCNC: 145 MMOL/L (ref 136–145)
WBC # BLD: 8.4 K/UL (ref 4.8–10.8)

## 2017-09-04 PROCEDURE — 6370000000 HC RX 637 (ALT 250 FOR IP): Performed by: INTERNAL MEDICINE

## 2017-09-04 PROCEDURE — 80048 BASIC METABOLIC PNL TOTAL CA: CPT

## 2017-09-04 PROCEDURE — 1210000000 HC MED SURG R&B

## 2017-09-04 PROCEDURE — 36415 COLL VENOUS BLD VENIPUNCTURE: CPT

## 2017-09-04 PROCEDURE — 71020 XR CHEST STANDARD TWO VW: CPT

## 2017-09-04 PROCEDURE — 6360000002 HC RX W HCPCS: Performed by: INTERNAL MEDICINE

## 2017-09-04 PROCEDURE — 85025 COMPLETE CBC W/AUTO DIFF WBC: CPT

## 2017-09-04 PROCEDURE — 94640 AIRWAY INHALATION TREATMENT: CPT

## 2017-09-04 PROCEDURE — 2580000003 HC RX 258: Performed by: INTERNAL MEDICINE

## 2017-09-04 PROCEDURE — 82948 REAGENT STRIP/BLOOD GLUCOSE: CPT

## 2017-09-04 PROCEDURE — 99232 SBSQ HOSP IP/OBS MODERATE 35: CPT | Performed by: INTERNAL MEDICINE

## 2017-09-04 PROCEDURE — 82803 BLOOD GASES ANY COMBINATION: CPT

## 2017-09-04 PROCEDURE — 84132 ASSAY OF SERUM POTASSIUM: CPT

## 2017-09-04 RX ORDER — POTASSIUM CHLORIDE 750 MG/1
10 TABLET, EXTENDED RELEASE ORAL 2 TIMES DAILY
Status: DISCONTINUED | OUTPATIENT
Start: 2017-09-04 | End: 2017-09-06 | Stop reason: HOSPADM

## 2017-09-04 RX ORDER — SODIUM CHLORIDE AND POTASSIUM CHLORIDE .9; .15 G/100ML; G/100ML
SOLUTION INTRAVENOUS CONTINUOUS
Status: DISCONTINUED | OUTPATIENT
Start: 2017-09-04 | End: 2017-09-06 | Stop reason: HOSPADM

## 2017-09-04 RX ORDER — LEVOFLOXACIN 5 MG/ML
500 INJECTION, SOLUTION INTRAVENOUS EVERY 24 HOURS
Status: DISCONTINUED | OUTPATIENT
Start: 2017-09-04 | End: 2017-09-06 | Stop reason: HOSPADM

## 2017-09-04 RX ADMIN — METHYLPREDNISOLONE SODIUM SUCCINATE 40 MG: 40 INJECTION, POWDER, FOR SOLUTION INTRAMUSCULAR; INTRAVENOUS at 23:54

## 2017-09-04 RX ADMIN — METHYLPREDNISOLONE SODIUM SUCCINATE 40 MG: 40 INJECTION, POWDER, FOR SOLUTION INTRAMUSCULAR; INTRAVENOUS at 11:27

## 2017-09-04 RX ADMIN — SODIUM CHLORIDE: 9 INJECTION, SOLUTION INTRAVENOUS at 00:02

## 2017-09-04 RX ADMIN — IPRATROPIUM BROMIDE AND ALBUTEROL SULFATE 1 AMPULE: .5; 3 SOLUTION RESPIRATORY (INHALATION) at 11:16

## 2017-09-04 RX ADMIN — POTASSIUM CHLORIDE 10 MEQ: 750 TABLET, EXTENDED RELEASE ORAL at 20:16

## 2017-09-04 RX ADMIN — IPRATROPIUM BROMIDE AND ALBUTEROL SULFATE 1 AMPULE: .5; 3 SOLUTION RESPIRATORY (INHALATION) at 15:18

## 2017-09-04 RX ADMIN — SERTRALINE HYDROCHLORIDE 150 MG: 100 TABLET ORAL at 10:01

## 2017-09-04 RX ADMIN — METHYLPREDNISOLONE SODIUM SUCCINATE 40 MG: 40 INJECTION, POWDER, FOR SOLUTION INTRAMUSCULAR; INTRAVENOUS at 01:08

## 2017-09-04 RX ADMIN — SULFACETAMIDE SODIUM AND PREDNISOLONE ACETATE: 100; 2 OINTMENT OPHTHALMIC at 15:04

## 2017-09-04 RX ADMIN — IPRATROPIUM BROMIDE AND ALBUTEROL SULFATE 1 AMPULE: .5; 3 SOLUTION RESPIRATORY (INHALATION) at 07:01

## 2017-09-04 RX ADMIN — DOCUSATE SODIUM 100 MG: 100 CAPSULE, LIQUID FILLED ORAL at 09:57

## 2017-09-04 RX ADMIN — CEFTRIAXONE SODIUM 1 G: 1 INJECTION, POWDER, FOR SOLUTION INTRAMUSCULAR; INTRAVENOUS at 00:02

## 2017-09-04 RX ADMIN — ENOXAPARIN SODIUM 40 MG: 40 INJECTION SUBCUTANEOUS at 09:56

## 2017-09-04 RX ADMIN — Medication 10 ML: at 00:03

## 2017-09-04 RX ADMIN — SODIUM CHLORIDE AND POTASSIUM CHLORIDE: 9; 1.49 INJECTION, SOLUTION INTRAVENOUS at 23:54

## 2017-09-04 RX ADMIN — LEVOFLOXACIN 500 MG: 5 INJECTION, SOLUTION INTRAVENOUS at 15:04

## 2017-09-04 RX ADMIN — Medication 10 ML: at 09:57

## 2017-09-04 RX ADMIN — IPRATROPIUM BROMIDE AND ALBUTEROL SULFATE 1 AMPULE: .5; 3 SOLUTION RESPIRATORY (INHALATION) at 19:18

## 2017-09-04 RX ADMIN — SODIUM CHLORIDE AND POTASSIUM CHLORIDE: 9; 1.49 INJECTION, SOLUTION INTRAVENOUS at 11:26

## 2017-09-04 RX ADMIN — CEFTRIAXONE SODIUM 1 G: 1 INJECTION, POWDER, FOR SOLUTION INTRAMUSCULAR; INTRAVENOUS at 23:54

## 2017-09-04 RX ADMIN — DOCUSATE SODIUM 100 MG: 100 CAPSULE, LIQUID FILLED ORAL at 00:02

## 2017-09-04 RX ADMIN — Medication 10 ML: at 20:16

## 2017-09-04 RX ADMIN — SULFACETAMIDE SODIUM AND PREDNISOLONE ACETATE: 100; 2 OINTMENT OPHTHALMIC at 20:16

## 2017-09-04 RX ADMIN — POTASSIUM CHLORIDE 10 MEQ: 750 TABLET, EXTENDED RELEASE ORAL at 11:27

## 2017-09-04 RX ADMIN — DOCUSATE SODIUM 100 MG: 100 CAPSULE, LIQUID FILLED ORAL at 20:16

## 2017-09-04 RX ADMIN — Medication 10 ML: at 09:58

## 2017-09-05 ENCOUNTER — APPOINTMENT (OUTPATIENT)
Dept: MRI IMAGING | Age: 52
DRG: 189 | End: 2017-09-05
Payer: MEDICAID

## 2017-09-05 PROBLEM — H53.8 BLURRING OF VISUAL IMAGE OF BOTH EYES: Status: ACTIVE | Noted: 2017-09-05

## 2017-09-05 LAB
ALBUMIN SERPL-MCNC: 3.2 G/DL (ref 3.5–5.2)
ALP BLD-CCNC: 123 U/L (ref 35–104)
ALT SERPL-CCNC: 40 U/L (ref 5–33)
AMMONIA: 31 MCG/DL (ref 11–51)
ANION GAP SERPL CALCULATED.3IONS-SCNC: 15 MMOL/L (ref 7–19)
AST SERPL-CCNC: 19 U/L (ref 5–32)
BILIRUB SERPL-MCNC: <0.2 MG/DL (ref 0.2–1.2)
BILIRUBIN DIRECT: 0.1 MG/DL (ref 0–0.3)
BILIRUBIN, INDIRECT: 0.1 MG/DL (ref 0.1–1)
BUN BLDV-MCNC: 6 MG/DL (ref 6–20)
CALCIUM SERPL-MCNC: 9 MG/DL (ref 8.6–10)
CHLORIDE BLD-SCNC: 103 MMOL/L (ref 98–111)
CO2: 25 MMOL/L (ref 22–29)
CREAT SERPL-MCNC: 0.3 MG/DL (ref 0.5–0.9)
GFR NON-AFRICAN AMERICAN: >60
GLUCOSE BLD-MCNC: 149 MG/DL (ref 74–109)
MAGNESIUM: 2.1 MG/DL (ref 1.6–2.6)
POTASSIUM SERPL-SCNC: 4.1 MMOL/L (ref 3.5–5)
SODIUM BLD-SCNC: 143 MMOL/L (ref 136–145)
TOTAL PROTEIN: 6.6 G/DL (ref 6.6–8.7)
TSH SERPL DL<=0.05 MIU/L-ACNC: 0.88 UIU/ML (ref 0.27–4.2)
URINE CULTURE, ROUTINE: NORMAL

## 2017-09-05 PROCEDURE — 6360000002 HC RX W HCPCS: Performed by: INTERNAL MEDICINE

## 2017-09-05 PROCEDURE — 6370000000 HC RX 637 (ALT 250 FOR IP): Performed by: INTERNAL MEDICINE

## 2017-09-05 PROCEDURE — 94640 AIRWAY INHALATION TREATMENT: CPT

## 2017-09-05 PROCEDURE — 82140 ASSAY OF AMMONIA: CPT

## 2017-09-05 PROCEDURE — 94761 N-INVAS EAR/PLS OXIMETRY MLT: CPT

## 2017-09-05 PROCEDURE — 2580000003 HC RX 258: Performed by: INTERNAL MEDICINE

## 2017-09-05 PROCEDURE — 80048 BASIC METABOLIC PNL TOTAL CA: CPT

## 2017-09-05 PROCEDURE — 83735 ASSAY OF MAGNESIUM: CPT

## 2017-09-05 PROCEDURE — 80076 HEPATIC FUNCTION PANEL: CPT

## 2017-09-05 PROCEDURE — 99223 1ST HOSP IP/OBS HIGH 75: CPT | Performed by: PSYCHIATRY & NEUROLOGY

## 2017-09-05 PROCEDURE — 36415 COLL VENOUS BLD VENIPUNCTURE: CPT

## 2017-09-05 PROCEDURE — 99232 SBSQ HOSP IP/OBS MODERATE 35: CPT | Performed by: HOSPITALIST

## 2017-09-05 PROCEDURE — 70551 MRI BRAIN STEM W/O DYE: CPT

## 2017-09-05 PROCEDURE — 84443 ASSAY THYROID STIM HORMONE: CPT

## 2017-09-05 PROCEDURE — 6370000000 HC RX 637 (ALT 250 FOR IP): Performed by: PSYCHIATRY & NEUROLOGY

## 2017-09-05 PROCEDURE — 1210000000 HC MED SURG R&B

## 2017-09-05 RX ORDER — GUAIFENESIN AND CODEINE PHOSPHATE 100; 10 MG/5ML; MG/5ML
5 SOLUTION ORAL EVERY 4 HOURS PRN
Status: DISCONTINUED | OUTPATIENT
Start: 2017-09-05 | End: 2017-09-06 | Stop reason: HOSPADM

## 2017-09-05 RX ADMIN — IPRATROPIUM BROMIDE AND ALBUTEROL SULFATE 1 AMPULE: .5; 3 SOLUTION RESPIRATORY (INHALATION) at 11:28

## 2017-09-05 RX ADMIN — SULFACETAMIDE SODIUM AND PREDNISOLONE ACETATE: 100; 2 OINTMENT OPHTHALMIC at 21:02

## 2017-09-05 RX ADMIN — DOCUSATE SODIUM 100 MG: 100 CAPSULE, LIQUID FILLED ORAL at 09:03

## 2017-09-05 RX ADMIN — Medication 10 ML: at 09:04

## 2017-09-05 RX ADMIN — ENOXAPARIN SODIUM 40 MG: 40 INJECTION SUBCUTANEOUS at 09:04

## 2017-09-05 RX ADMIN — SULFACETAMIDE SODIUM AND PREDNISOLONE ACETATE: 100; 2 OINTMENT OPHTHALMIC at 09:03

## 2017-09-05 RX ADMIN — SULFACETAMIDE SODIUM AND PREDNISOLONE ACETATE: 100; 2 OINTMENT OPHTHALMIC at 14:36

## 2017-09-05 RX ADMIN — POTASSIUM CHLORIDE 10 MEQ: 750 TABLET, EXTENDED RELEASE ORAL at 09:03

## 2017-09-05 RX ADMIN — SERTRALINE HYDROCHLORIDE 150 MG: 100 TABLET ORAL at 09:03

## 2017-09-05 RX ADMIN — IPRATROPIUM BROMIDE AND ALBUTEROL SULFATE 1 AMPULE: .5; 3 SOLUTION RESPIRATORY (INHALATION) at 19:16

## 2017-09-05 RX ADMIN — GUAIFENESIN AND DEXTROMETHORPHAN 5 ML: 100; 10 SYRUP ORAL at 21:01

## 2017-09-05 RX ADMIN — SODIUM CHLORIDE AND POTASSIUM CHLORIDE: 9; 1.49 INJECTION, SOLUTION INTRAVENOUS at 21:02

## 2017-09-05 RX ADMIN — LEVOFLOXACIN 500 MG: 5 INJECTION, SOLUTION INTRAVENOUS at 14:35

## 2017-09-05 RX ADMIN — METHYLPREDNISOLONE SODIUM SUCCINATE 40 MG: 40 INJECTION, POWDER, FOR SOLUTION INTRAMUSCULAR; INTRAVENOUS at 11:27

## 2017-09-05 RX ADMIN — GUAIFENESIN AND CODEINE PHOSPHATE 5 ML: 10; 100 LIQUID ORAL at 14:35

## 2017-09-05 RX ADMIN — DOCUSATE SODIUM 100 MG: 100 CAPSULE, LIQUID FILLED ORAL at 21:01

## 2017-09-05 RX ADMIN — IPRATROPIUM BROMIDE AND ALBUTEROL SULFATE 1 AMPULE: .5; 3 SOLUTION RESPIRATORY (INHALATION) at 07:10

## 2017-09-05 RX ADMIN — IPRATROPIUM BROMIDE AND ALBUTEROL SULFATE 1 AMPULE: .5; 3 SOLUTION RESPIRATORY (INHALATION) at 15:05

## 2017-09-05 RX ADMIN — GUAIFENESIN AND DEXTROMETHORPHAN 5 ML: 100; 10 SYRUP ORAL at 10:32

## 2017-09-05 RX ADMIN — POTASSIUM CHLORIDE 10 MEQ: 750 TABLET, EXTENDED RELEASE ORAL at 21:01

## 2017-09-06 VITALS
RESPIRATION RATE: 20 BRPM | BODY MASS INDEX: 32.47 KG/M2 | TEMPERATURE: 97.3 F | SYSTOLIC BLOOD PRESSURE: 125 MMHG | HEART RATE: 84 BPM | OXYGEN SATURATION: 94 % | WEIGHT: 202 LBS | DIASTOLIC BLOOD PRESSURE: 81 MMHG | HEIGHT: 66 IN

## 2017-09-06 PROCEDURE — 6360000002 HC RX W HCPCS: Performed by: INTERNAL MEDICINE

## 2017-09-06 PROCEDURE — 94640 AIRWAY INHALATION TREATMENT: CPT

## 2017-09-06 PROCEDURE — 6370000000 HC RX 637 (ALT 250 FOR IP): Performed by: INTERNAL MEDICINE

## 2017-09-06 PROCEDURE — G8989 SELF CARE D/C STATUS: HCPCS

## 2017-09-06 PROCEDURE — 6370000000 HC RX 637 (ALT 250 FOR IP): Performed by: PSYCHIATRY & NEUROLOGY

## 2017-09-06 PROCEDURE — 2580000003 HC RX 258: Performed by: INTERNAL MEDICINE

## 2017-09-06 PROCEDURE — G8988 SELF CARE GOAL STATUS: HCPCS

## 2017-09-06 PROCEDURE — G8987 SELF CARE CURRENT STATUS: HCPCS

## 2017-09-06 PROCEDURE — 99233 SBSQ HOSP IP/OBS HIGH 50: CPT | Performed by: PSYCHIATRY & NEUROLOGY

## 2017-09-06 PROCEDURE — 99239 HOSP IP/OBS DSCHRG MGMT >30: CPT | Performed by: HOSPITALIST

## 2017-09-06 PROCEDURE — 97166 OT EVAL MOD COMPLEX 45 MIN: CPT

## 2017-09-06 RX ORDER — DOXYCYCLINE HYCLATE 100 MG
100 TABLET ORAL 2 TIMES DAILY
Qty: 10 TABLET | Refills: 0 | Status: SHIPPED | OUTPATIENT
Start: 2017-09-06 | End: 2017-09-11

## 2017-09-06 RX ORDER — CEFDINIR 300 MG/1
300 CAPSULE ORAL 2 TIMES DAILY
Qty: 10 CAPSULE | Refills: 0 | Status: SHIPPED | OUTPATIENT
Start: 2017-09-06 | End: 2017-09-11

## 2017-09-06 RX ORDER — PREDNISONE 10 MG/1
TABLET ORAL
Qty: 21 TABLET | Refills: 0 | Status: SHIPPED | OUTPATIENT
Start: 2017-09-06 | End: 2017-11-10

## 2017-09-06 RX ORDER — NICOTINE 21 MG/24HR
1 PATCH, TRANSDERMAL 24 HOURS TRANSDERMAL DAILY
Qty: 30 PATCH | Refills: 0 | Status: SHIPPED | OUTPATIENT
Start: 2017-09-06 | End: 2018-02-01

## 2017-09-06 RX ORDER — ACETAMINOPHEN 325 MG/1
650 TABLET ORAL EVERY 6 HOURS PRN
Status: DISCONTINUED | OUTPATIENT
Start: 2017-09-06 | End: 2017-09-06 | Stop reason: HOSPADM

## 2017-09-06 RX ORDER — GUAIFENESIN AND CODEINE PHOSPHATE 100; 10 MG/5ML; MG/5ML
5 SOLUTION ORAL 4 TIMES DAILY PRN
Qty: 60 ML | Refills: 0 | Status: SHIPPED | OUTPATIENT
Start: 2017-09-06 | End: 2017-09-13

## 2017-09-06 RX ADMIN — GUAIFENESIN AND CODEINE PHOSPHATE 5 ML: 10; 100 LIQUID ORAL at 04:41

## 2017-09-06 RX ADMIN — POTASSIUM CHLORIDE 10 MEQ: 750 TABLET, EXTENDED RELEASE ORAL at 09:30

## 2017-09-06 RX ADMIN — IPRATROPIUM BROMIDE AND ALBUTEROL SULFATE 1 AMPULE: .5; 3 SOLUTION RESPIRATORY (INHALATION) at 11:03

## 2017-09-06 RX ADMIN — DOCUSATE SODIUM 100 MG: 100 CAPSULE, LIQUID FILLED ORAL at 09:31

## 2017-09-06 RX ADMIN — SERTRALINE HYDROCHLORIDE 150 MG: 100 TABLET ORAL at 09:31

## 2017-09-06 RX ADMIN — IPRATROPIUM BROMIDE AND ALBUTEROL SULFATE 1 AMPULE: .5; 3 SOLUTION RESPIRATORY (INHALATION) at 07:35

## 2017-09-06 RX ADMIN — ACETAMINOPHEN 650 MG: 325 TABLET, FILM COATED ORAL at 06:52

## 2017-09-06 RX ADMIN — ENOXAPARIN SODIUM 40 MG: 40 INJECTION SUBCUTANEOUS at 09:30

## 2017-09-06 RX ADMIN — METHYLPREDNISOLONE SODIUM SUCCINATE 40 MG: 40 INJECTION, POWDER, FOR SOLUTION INTRAMUSCULAR; INTRAVENOUS at 00:49

## 2017-09-06 RX ADMIN — Medication 10 ML: at 10:39

## 2017-09-06 RX ADMIN — SULFACETAMIDE SODIUM AND PREDNISOLONE ACETATE: 100; 2 OINTMENT OPHTHALMIC at 10:38

## 2017-09-06 RX ADMIN — CEFTRIAXONE SODIUM 1 G: 1 INJECTION, POWDER, FOR SOLUTION INTRAMUSCULAR; INTRAVENOUS at 00:49

## 2017-09-06 ASSESSMENT — PAIN SCALES - GENERAL
PAINLEVEL_OUTOF10: 6
PAINLEVEL_OUTOF10: 6

## 2017-09-07 ENCOUNTER — TELEPHONE (OUTPATIENT)
Dept: PRIMARY CARE CLINIC | Age: 52
End: 2017-09-07

## 2017-09-08 LAB
EKG P AXIS: -32 DEGREES
EKG P-R INTERVAL: 185 MS
EKG Q-T INTERVAL: 370 MS
EKG QRS DURATION: 100 MS
EKG QTC CALCULATION (BAZETT): 443 MS
EKG T AXIS: 24 DEGREES

## 2017-09-09 LAB
BLOOD CULTURE, ROUTINE: NORMAL
CULTURE, BLOOD 2: NORMAL

## 2017-09-14 ENCOUNTER — OFFICE VISIT (OUTPATIENT)
Dept: PRIMARY CARE CLINIC | Age: 52
End: 2017-09-14
Payer: MEDICAID

## 2017-09-14 VITALS
DIASTOLIC BLOOD PRESSURE: 72 MMHG | RESPIRATION RATE: 18 BRPM | TEMPERATURE: 97.1 F | HEIGHT: 66 IN | OXYGEN SATURATION: 98 % | SYSTOLIC BLOOD PRESSURE: 126 MMHG | HEART RATE: 77 BPM

## 2017-09-14 DIAGNOSIS — F41.9 ANXIETY: Primary | ICD-10-CM

## 2017-09-14 DIAGNOSIS — J18.9 PNEUMONIA OF LEFT LOWER LOBE DUE TO INFECTIOUS ORGANISM: ICD-10-CM

## 2017-09-14 DIAGNOSIS — H53.8 BLURRING OF VISUAL IMAGE OF BOTH EYES: ICD-10-CM

## 2017-09-14 PROCEDURE — 99214 OFFICE O/P EST MOD 30 MIN: CPT | Performed by: NURSE PRACTITIONER

## 2017-09-14 RX ORDER — HYDROCODONE BITARTRATE AND ACETAMINOPHEN 10; 325 MG/1; MG/1
1 TABLET ORAL EVERY 8 HOURS PRN
Qty: 90 TABLET | Refills: 0 | Status: SHIPPED | OUTPATIENT
Start: 2017-09-24 | End: 2017-11-07 | Stop reason: SDUPTHER

## 2017-09-14 ASSESSMENT — ENCOUNTER SYMPTOMS
COUGH: 0
GASTROINTESTINAL NEGATIVE: 1
EYE PAIN: 1
PHOTOPHOBIA: 1
SHORTNESS OF BREATH: 0
ALLERGIC/IMMUNOLOGIC NEGATIVE: 1

## 2017-09-20 ENCOUNTER — TELEPHONE (OUTPATIENT)
Dept: PRIMARY CARE CLINIC | Age: 52
End: 2017-09-20

## 2017-10-11 ENCOUNTER — TELEPHONE (OUTPATIENT)
Dept: PRIMARY CARE CLINIC | Age: 52
End: 2017-10-11

## 2017-10-11 RX ORDER — SIMVASTATIN 80 MG
80 TABLET ORAL NIGHTLY
Qty: 30 TABLET | Refills: 5 | OUTPATIENT
Start: 2017-10-11 | End: 2018-01-08 | Stop reason: SDUPTHER

## 2017-10-11 RX ORDER — ROSUVASTATIN CALCIUM 10 MG/1
10 TABLET, COATED ORAL NIGHTLY
Qty: 30 TABLET | Refills: 3 | Status: SHIPPED | OUTPATIENT
Start: 2017-10-11 | End: 2017-10-11

## 2017-11-07 NOTE — TELEPHONE ENCOUNTER
Pt requesting a refill on Rochelle. Dose verified. Last fill date 10/24. Last OV 9/14/2014.  Next OV 11/10/2017

## 2017-11-10 ENCOUNTER — OFFICE VISIT (OUTPATIENT)
Dept: PRIMARY CARE CLINIC | Age: 52
End: 2017-11-10
Payer: MEDICAID

## 2017-11-10 VITALS
HEART RATE: 89 BPM | HEIGHT: 66 IN | DIASTOLIC BLOOD PRESSURE: 70 MMHG | SYSTOLIC BLOOD PRESSURE: 110 MMHG | OXYGEN SATURATION: 97 % | BODY MASS INDEX: 32.78 KG/M2 | WEIGHT: 204 LBS | TEMPERATURE: 97.6 F

## 2017-11-10 DIAGNOSIS — R42 VERTIGO: Primary | ICD-10-CM

## 2017-11-10 DIAGNOSIS — F12.90 MARIJUANA USE: ICD-10-CM

## 2017-11-10 DIAGNOSIS — G71.00 MUSCULAR DYSTROPHY (HCC): ICD-10-CM

## 2017-11-10 PROCEDURE — 99214 OFFICE O/P EST MOD 30 MIN: CPT | Performed by: NURSE PRACTITIONER

## 2017-11-10 RX ORDER — MECLIZINE HCL 12.5 MG/1
12.5 TABLET ORAL 3 TIMES DAILY PRN
Qty: 30 TABLET | Refills: 1 | Status: SHIPPED | OUTPATIENT
Start: 2017-11-10 | End: 2017-11-20

## 2017-11-10 RX ORDER — HYDROCODONE BITARTRATE AND ACETAMINOPHEN 10; 325 MG/1; MG/1
1 TABLET ORAL EVERY 8 HOURS PRN
Qty: 90 TABLET | Refills: 0 | Status: SHIPPED | OUTPATIENT
Start: 2017-11-10 | End: 2017-12-04 | Stop reason: SDUPTHER

## 2017-11-10 ASSESSMENT — ENCOUNTER SYMPTOMS
EYES NEGATIVE: 1
GASTROINTESTINAL NEGATIVE: 1
RESPIRATORY NEGATIVE: 1

## 2017-11-10 NOTE — TELEPHONE ENCOUNTER
SUN was reviewed today per office protocol. Report shows No discrepancies. Fill pattern is consistent from single provider(s) at single pharmacy(s). Prescription escribed.  Patient is aware to check within the pharmacy

## 2017-12-04 NOTE — TELEPHONE ENCOUNTER
patient called with request for refill on [employer]ra Discovery[employer]. Last office visit 11-10 with next scheduled appointment 12-19  Dose verified.    Last UDS  unknown    Last fill per chart 11-10

## 2017-12-06 RX ORDER — HYDROCODONE BITARTRATE AND ACETAMINOPHEN 10; 325 MG/1; MG/1
1 TABLET ORAL EVERY 8 HOURS PRN
Qty: 90 TABLET | Refills: 0 | Status: SHIPPED | OUTPATIENT
Start: 2017-12-09 | End: 2018-02-01 | Stop reason: SDUPTHER

## 2017-12-06 NOTE — TELEPHONE ENCOUNTER
SUN was reviewed today per office protocol. Report shows No discrepancies. Fill pattern is consistent from single provider(s) at single pharmacy(s). Prescription escribed.  Patient is aware to check within the pharmacy        UDS needed prior to next fill

## 2018-01-03 NOTE — TELEPHONE ENCOUNTER
Gilmar Morataya called with request for refill on norco. Last office visit 11-10 with next scheduled appointment 1-30  Dose verified. Last UDS  9-3-2017  Jv Roth pending  Last fill per 12-9  Component Results     Component Value Ref Range & Units Status Collected Lab   Amphetamine Screen, Urine Negative  Negative <1000 ng/mL Final 09/03/2017  8:34 PM [address] [facility]   Barbiturate Screen, Ur Negative  Negative < 200 ng/mL Final 09/03/2017  8:34 PM [facility]   Benzodiazepine Screen, Urine Negative  Negative <100 ng/mL Final 09/03/2017  8:34 PM [facility]   Cannabinoid Scrn, Ur Positive   Negative <50 ng/mL Final 09/03/2017  8:34 PM [address] [place] [address] URINE Negative  Negative <300 ng/mL Final 09/03/2017  8:34 PM [facility]   Opiate Scrn, Ur Negative  Negative < 300 ng/mL Final 09/03/2017  8:34 PM [facility]   Drug Screen Comment: see below   Final 09/03/2017  8:34 PM [facility]   This method is a screening test to detect only these drug   classes as part of a medical workup.  Confirmatory testing   by another method should be ordered if clinically indicated.     Testing Performed By     Lab - Abbreviation Name Director Address Valid Date Range   546-[facility] - [address] [facility] [address] [facility] Xochitl Germain MD

## 2018-01-05 RX ORDER — HYDROCODONE BITARTRATE AND ACETAMINOPHEN 10; 325 MG/1; MG/1
1 TABLET ORAL EVERY 8 HOURS PRN
Qty: 90 TABLET | Refills: 0 | OUTPATIENT
Start: 2018-01-05 | End: 2018-02-04

## 2018-01-08 ENCOUNTER — OFFICE VISIT (OUTPATIENT)
Dept: PRIMARY CARE CLINIC | Age: 53
End: 2018-01-08
Payer: MEDICAID

## 2018-01-08 DIAGNOSIS — Z02.83 ENCOUNTER FOR DRUG SCREENING: Primary | ICD-10-CM

## 2018-01-08 LAB
AMPHETAMINE SCREEN, URINE: ABNORMAL
BARBITURATE SCREEN, URINE: ABNORMAL
BENZODIAZEPINE SCREEN, URINE: ABNORMAL
COCAINE METABOLITE SCREEN URINE: ABNORMAL
MDMA URINE: ABNORMAL
METHADONE SCREEN, URINE: ABNORMAL
METHAMPHETAMINE, URINE: ABNORMAL
OPIATE SCREEN URINE: ABNORMAL
OXYCODONE SCREEN URINE: ABNORMAL
PHENCYCLIDINE SCREEN URINE: ABNORMAL
PROPOXYPHENE SCREEN, URINE: ABNORMAL
THC: ABNORMAL
TRICYCLIC ANTIDEPRESSANTS, UR: ABNORMAL

## 2018-01-08 PROCEDURE — 80305 DRUG TEST PRSMV DIR OPT OBS: CPT | Performed by: NURSE PRACTITIONER

## 2018-01-08 RX ORDER — SIMVASTATIN 80 MG
80 TABLET ORAL NIGHTLY
Qty: 30 TABLET | Refills: 5 | Status: SHIPPED | OUTPATIENT
Start: 2018-01-08 | End: 2018-05-01 | Stop reason: SDUPTHER

## 2018-01-08 RX ORDER — HYDROCODONE BITARTRATE AND ACETAMINOPHEN 10; 325 MG/1; MG/1
1 TABLET ORAL EVERY 8 HOURS PRN
Qty: 90 TABLET | Refills: 0 | OUTPATIENT
Start: 2018-01-08 | End: 2018-02-07

## 2018-01-12 RX ORDER — HYDROCODONE BITARTRATE AND ACETAMINOPHEN 10; 325 MG/1; MG/1
1 TABLET ORAL EVERY 8 HOURS PRN
Qty: 90 TABLET | Refills: 0 | OUTPATIENT
Start: 2018-01-12 | End: 2018-02-11

## 2018-02-01 ENCOUNTER — TELEPHONE (OUTPATIENT)
Dept: PRIMARY CARE CLINIC | Age: 53
End: 2018-02-01

## 2018-02-01 ENCOUNTER — OFFICE VISIT (OUTPATIENT)
Dept: PRIMARY CARE CLINIC | Age: 53
End: 2018-02-01
Payer: MEDICAID

## 2018-02-01 VITALS
HEIGHT: 66 IN | TEMPERATURE: 99 F | BODY MASS INDEX: 35.1 KG/M2 | SYSTOLIC BLOOD PRESSURE: 124 MMHG | DIASTOLIC BLOOD PRESSURE: 84 MMHG | OXYGEN SATURATION: 98 % | HEART RATE: 104 BPM | WEIGHT: 218.4 LBS

## 2018-02-01 DIAGNOSIS — F41.9 ANXIETY: ICD-10-CM

## 2018-02-01 DIAGNOSIS — G71.00 MUSCULAR DYSTROPHY (HCC): Primary | ICD-10-CM

## 2018-02-01 DIAGNOSIS — R73.9 HYPERGLYCEMIA: ICD-10-CM

## 2018-02-01 DIAGNOSIS — G89.4 CHRONIC PAIN SYNDROME: ICD-10-CM

## 2018-02-01 DIAGNOSIS — F12.90 MARIJUANA USE: ICD-10-CM

## 2018-02-01 DIAGNOSIS — E78.5 HYPERLIPIDEMIA, UNSPECIFIED HYPERLIPIDEMIA TYPE: ICD-10-CM

## 2018-02-01 LAB
AMPHETAMINE SCREEN, URINE: NORMAL
BARBITURATE SCREEN, URINE: NORMAL
BENZODIAZEPINE SCREEN, URINE: NORMAL
COCAINE METABOLITE SCREEN URINE: NORMAL
MDMA URINE: NORMAL
METHADONE SCREEN, URINE: NORMAL
METHAMPHETAMINE, URINE: NORMAL
OPIATE SCREEN URINE: NORMAL
OXYCODONE SCREEN URINE: NORMAL
PHENCYCLIDINE SCREEN URINE: NORMAL
PROPOXYPHENE SCREEN, URINE: NORMAL
THC: NORMAL
TRICYCLIC ANTIDEPRESSANTS, UR: NORMAL

## 2018-02-01 PROCEDURE — 99214 OFFICE O/P EST MOD 30 MIN: CPT | Performed by: FAMILY MEDICINE

## 2018-02-01 PROCEDURE — 80305 DRUG TEST PRSMV DIR OPT OBS: CPT | Performed by: FAMILY MEDICINE

## 2018-02-01 RX ORDER — DIPHENOXYLATE HYDROCHLORIDE AND ATROPINE SULFATE 2.5; .025 MG/1; MG/1
1 TABLET ORAL 4 TIMES DAILY PRN
Qty: 60 TABLET | Refills: 3 | Status: SHIPPED | OUTPATIENT
Start: 2018-02-01 | End: 2018-10-01 | Stop reason: SDUPTHER

## 2018-02-01 RX ORDER — HYDROCODONE BITARTRATE AND ACETAMINOPHEN 10; 325 MG/1; MG/1
1 TABLET ORAL EVERY 8 HOURS PRN
Qty: 90 TABLET | Refills: 0 | Status: SHIPPED | OUTPATIENT
Start: 2018-02-01 | End: 2018-02-27 | Stop reason: SDUPTHER

## 2018-02-01 RX ORDER — GUAIFENESIN 600 MG/1
1200 TABLET, EXTENDED RELEASE ORAL 2 TIMES DAILY
COMMUNITY
End: 2021-07-13

## 2018-02-01 ASSESSMENT — PATIENT HEALTH QUESTIONNAIRE - PHQ9
SUM OF ALL RESPONSES TO PHQ QUESTIONS 1-9: 0
SUM OF ALL RESPONSES TO PHQ9 QUESTIONS 1 & 2: 0
1. LITTLE INTEREST OR PLEASURE IN DOING THINGS: 0
2. FEELING DOWN, DEPRESSED OR HOPELESS: 0

## 2018-02-01 NOTE — PROGRESS NOTES
History:   Diagnosis Date    Anxiety     Depression     Hyperlipidemia     Muscular dystrophy ([facility] [address]     Urinary incontinence        Past Surgical History:   Procedure Laterality Date    APPENDECTOMY      CATARACT REMOVAL       SECTION      CHOLECYSTECTOMY      COLONOSCOPY  2017    Dr. Jojo Callahan      mariela in right leg    NECK SURGERY      extra ligament       Social History     Social History    Marital status:      Spouse name: N/A    Number of children: N/A    Years of education: N/A     Social History Main Topics    Smoking status: Current Some Day Smoker     Packs/day: 0.50     Years: 20.00     Types: Cigarettes    Smokeless tobacco: Former User      Comment: used an electronic cig/smokes marijuana    Alcohol use No    Drug use: No    Sexual activity: Not Asked     Other Topics Concern    None     Social History Narrative    None       Physical Exam   Constitutional: She is oriented to person, place, and time. She appears well-developed and well-nourished. No distress. HENT:   Head: Normocephalic and atraumatic. Neck: Normal range of motion. Cardiovascular: Normal rate and regular rhythm. Pulmonary/Chest: Effort normal and breath sounds normal. She has no wheezes. She has no rales. Abdominal: Soft. She exhibits no distension. There is no tenderness. There is no rebound. Musculoskeletal: Normal range of motion. Tenderness over xiphoid process   Neurological: She is alert and oriented to person, place, and time. Gait abnormal.   Generalized weakness related to muscular dystrophy. In wheelchair     Skin: Skin is warm and dry. Psychiatric: She has a normal mood and affect. Her behavior is normal.   Nursing note and vitals reviewed. Assessment    ICD-10-CM ICD-9-CM    1. Muscular dystrophy ([facility] [address] G71.0 359.1 Will restart Norco.    2. Marijuana use F12.90 305.20 Patient has avoided marijuana.  Had lengthy discussion about restarting pain medications. Patient is agreeable not to use any marijuana. UDS is negative for all substances. 3. Anxiety F41.9 300.00 Continue Zoloft. 4. Chronic pain syndrome G89.4 338.4 Tonya is in severe pain and would like to restart her pain medications. 5. Hyperglycemia R73.9 790.29 Check Hemoglobin A1C   6. Hyperlipidemia, unspecified hyperlipidemia type E78.5 272.4 Check Comprehensive Metabolic Panel      Lipid panel           Plan      Orders Placed This Encounter   Medications    diphenoxylate-atropine (LOMOTIL) 2.5-0.025 MG per tablet     Sig: Take 1 tablet by mouth 4 times daily as needed for Diarrhea for up to 30 days. Dispense:  60 tablet     Refill:  3       Orders Placed This Encounter   Procedures    Comprehensive Metabolic Panel    Hemoglobin A1C    Lipid panel        No Follow-up on file. There are no Patient Instructions on file for this visit.

## 2018-02-02 ASSESSMENT — ENCOUNTER SYMPTOMS
COUGH: 0
BACK PAIN: 1
SHORTNESS OF BREATH: 0

## 2018-02-07 ENCOUNTER — TELEPHONE (OUTPATIENT)
Dept: PRIMARY CARE CLINIC | Age: 53
End: 2018-02-07

## 2018-02-08 RX ORDER — DEXTROMETHORPHAN HYDROBROMIDE AND PROMETHAZINE HYDROCHLORIDE 15; 6.25 MG/5ML; MG/5ML
5 SYRUP ORAL 4 TIMES DAILY PRN
Qty: 1 BOTTLE | Refills: 1 | Status: SHIPPED | OUTPATIENT
Start: 2018-02-08 | End: 2018-02-15

## 2018-02-27 DIAGNOSIS — G89.4 CHRONIC PAIN SYNDROME: ICD-10-CM

## 2018-02-27 DIAGNOSIS — G71.00 MUSCULAR DYSTROPHY (HCC): ICD-10-CM

## 2018-02-27 RX ORDER — HYDROCODONE BITARTRATE AND ACETAMINOPHEN 10; 325 MG/1; MG/1
1 TABLET ORAL EVERY 8 HOURS PRN
Qty: 90 TABLET | Refills: 0 | Status: SHIPPED | OUTPATIENT
Start: 2018-02-28 | End: 2018-03-26 | Stop reason: SDUPTHER

## 2018-02-27 NOTE — TELEPHONE ENCOUNTER
Pt called today with request for refill on Norco. Last office visit 2/1/2018 with next scheduled appointment 5/1/18. Dose verified.    Last UDS  2/1/18  positive for MOP  Last fill per Dr. Wilfred De Los Santos 2/1/18

## 2018-02-28 ENCOUNTER — OFFICE VISIT (OUTPATIENT)
Dept: OTOLARYNGOLOGY | Age: 53
End: 2018-02-28
Payer: MEDICAID

## 2018-02-28 VITALS
RESPIRATION RATE: 20 BRPM | WEIGHT: 214 LBS | TEMPERATURE: 97.7 F | BODY MASS INDEX: 35.65 KG/M2 | SYSTOLIC BLOOD PRESSURE: 130 MMHG | OXYGEN SATURATION: 96 % | HEART RATE: 99 BPM | DIASTOLIC BLOOD PRESSURE: 74 MMHG | HEIGHT: 65 IN

## 2018-02-28 DIAGNOSIS — R42 DIZZINESS: Primary | ICD-10-CM

## 2018-02-28 DIAGNOSIS — M77.9 STYLOHYOID TENDINITIS: ICD-10-CM

## 2018-02-28 DIAGNOSIS — H81.10 BPV (BENIGN POSITIONAL VERTIGO), UNSPECIFIED LATERALITY: ICD-10-CM

## 2018-02-28 DIAGNOSIS — H65.93 OTITIS MEDIA WITH EFFUSION, BILATERAL: ICD-10-CM

## 2018-02-28 DIAGNOSIS — K21.9 LARYNGOPHARYNGEAL REFLUX (LPR): ICD-10-CM

## 2018-02-28 DIAGNOSIS — J34.89 SINUS PRESSURE: ICD-10-CM

## 2018-02-28 PROCEDURE — 31575 DIAGNOSTIC LARYNGOSCOPY: CPT | Performed by: OTOLARYNGOLOGY

## 2018-02-28 PROCEDURE — 92504 EAR MICROSCOPY EXAMINATION: CPT | Performed by: OTOLARYNGOLOGY

## 2018-02-28 PROCEDURE — 99204 OFFICE O/P NEW MOD 45 MIN: CPT | Performed by: OTOLARYNGOLOGY

## 2018-02-28 NOTE — PROGRESS NOTES
consciousness and no associated headache. History of muscular dystrophy as well and 6 month dizzy hx associated with head position changes. BPV like symptoms that have not abated and MRI done fall of 2017 wnl. No sinus disease on MRI or CT head done after falll at that time but symptoms of facial pressure. Has gait / balance issues that are severe due to calf muscle atrophy and related to muscular dystrophy. Review of Systems  A 12 point review of systems was completed, reviewed, and scanned to chart per staff. Patient medical history reviewed. Objective:     Physical Exam   Constitutional: She is oriented to person, place, and time. She appears well-developed and well-nourished. HENT:   Head: Normocephalic and atraumatic. Right Ear: Hearing, tympanic membrane, external ear and ear canal normal. No drainage. No decreased hearing is noted. Left Ear: Hearing, tympanic membrane, external ear and ear canal normal. No drainage. No decreased hearing is noted. Nose: Septal deviation present. No mucosal edema or rhinorrhea. Right sinus exhibits no maxillary sinus tenderness and no frontal sinus tenderness. Left sinus exhibits no maxillary sinus tenderness and no frontal sinus tenderness. Mouth/Throat: Uvula is midline and oropharynx is clear and moist. No oral lesions. Eyes: Conjunctivae and EOM are normal. Pupils are equal, round, and reactive to light. Neck: Normal range of motion. Neck supple. No tracheal deviation present. No thyromegaly present. Pulmonary/Chest: No stridor. Lymphadenopathy:     She has no cervical adenopathy. Neurological: She is alert and oriented to person, place, and time. No cranial nerve deficit. Coordination normal.   Psychiatric: She has a normal mood and affect. Her behavior is normal.   Nursing note and vitals reviewed. Due to nature of patient complaint a microscopic exam of the ear was performed.   Due to the nature of the patient's complaints and/or means of evaluation but patient has neurology for M dystrophy and Negative MRI last Fall. Referral to an institution with a vestibular testing center recommended if vestibular therapy unsuccessful. RTO 2 months. Artemio Vasquez am scribing for and in the presence of Dr. Quincy Field February 28, 2018/11:32 AM/ Delvis Ramirez. Cate Gupta MD,  I personally performed the services described in this documentation.

## 2018-03-05 ENCOUNTER — HOSPITAL ENCOUNTER (OUTPATIENT)
Dept: PHYSICAL THERAPY | Age: 53
Setting detail: THERAPIES SERIES
Discharge: HOME | End: 2018-03-05
Payer: MEDICAID

## 2018-03-05 PROCEDURE — 97163 PT EVAL HIGH COMPLEX 45 MIN: CPT

## 2018-03-05 PROCEDURE — G8978 MOBILITY CURRENT STATUS: HCPCS

## 2018-03-05 PROCEDURE — G8979 MOBILITY GOAL STATUS: HCPCS

## 2018-03-05 NOTE — PROGRESS NOTES
With: Alone (Patient has home health with her majority of the time)  Type of Home: Apartment  Home Layout: One level  Bathroom Toilet: Handicap height  Bathroom Equipment: Shower chair  Bathroom Accessibility: Wheelchair accessible  ADL Assistance: Needs assistance (patient says her home health aid helps her with adl's, but has had to help her more since she has had the dizziness, more trouble with dressing lower body to include pants and shoes due to dizziness)  Homemaking Assistance: Needs assistance (patient says she gets help with IADL's due to muscular dystrophy, but she has had to have more help recently to include with loading dishes due to difficulty with bending forward )  Active : No  Occupation: On disability  Type of occupation: disabled  Leisure & Hobbies: patient has not been able to complete due to having eye problems and the words look like they are moving on the page     IADL Comments: looking up on shelf at the grocery increases dizziness  Additional Comments: patient fell 3 weeks ago going to the bathroom and felt it was a combination of dizziness and weakness in her legs  Objective     Observation/Palpation  Posture: Fair (forward shoulders and head with decreased lumbar loridosis)    AROM RLE (degrees)  RLE AROM: WFL  AROM LLE (degrees)  LLE AROM : WFL  Spine  Cervical: flex wnl, ext 50%, rotation left 50%, left 75%, sidebending left 50%, right 100% *patient had surgery that removed a ligament in the right side of her neck that had caused her to have increased right neck sidebending and scoliosis inher back  Special Tests: patient reported dizziness with looking up and looking left, nystagmus noted with smooth pursuit, no nystagmus with gaze holding, negative bilateral test of provocation with dizziness reported, but no blurred vision, double vision, and no difficulty with speech, patient reported dizziness with all positional testing, but had noted nystagmus with left xavier ezekiel pike shopping  Long term goal 2: Patient to have no dizziness with any positional testing of ant/post/horizontal canals  Long term goal 3: Patient to report no dizziness with getting lower body dressed  Long term goal 4: Patient to demo increased function by scoring <= 60% impairment on PSFS   Patient Goals   Patient goals : decrease dizziness       Therapy Time   Individual Concurrent Group Co-treatment   Time In 1251         Time Out 1345         Minutes [address] [facility], PT     Electronically signed by Vicki Carballo PT on 3/5/2018 at 2:19 PM

## 2018-03-14 ENCOUNTER — HOSPITAL ENCOUNTER (OUTPATIENT)
Dept: PHYSICAL THERAPY | Age: 53
Setting detail: THERAPIES SERIES
End: 2018-03-14
Payer: MEDICAID

## 2018-03-19 ENCOUNTER — HOSPITAL ENCOUNTER (OUTPATIENT)
Dept: PHYSICAL THERAPY | Age: 53
Setting detail: THERAPIES SERIES
Discharge: HOME | End: 2018-03-19
Payer: MEDICAID

## 2018-03-19 PROCEDURE — 95992 CANALITH REPOSITIONING PROC: CPT

## 2018-03-19 PROCEDURE — 97110 THERAPEUTIC EXERCISES: CPT

## 2018-03-19 NOTE — PROGRESS NOTES
sb'ing  Exercise 8: Left ant/post canalith positioning with seated rest x 10 minutes performed                                   Assessment:   Conditions Requiring Skilled Therapeutic Intervention  Body structures, Functions, Activity limitations: Decreased functional mobility ; Decreased ADL status; Decreased ROM; Decreased strength;Decreased endurance;Decreased balance;Decreased vision/visual deficit; Decreased high-level IADLs  Assessment: Patient had increased dizziness with all habituation ex's today, but minimal nystagmus during habituation ex's. Patient did have nystagmus with left ant/post xavier ezekiel pik position, therefore canalith performed. Patient able to tolerate canalith, but did have limited rom into ext and actively tried to flex during canalith and required v.c. to stay in ext.    Treatment Diagnosis: dizziness, BPPV with nystagmus with left xavier ezekiel pike positional test  Patient Education: patient education and care giver education for home canalith positioning maneuver, patient and caregiver had good understanding                                              Goals:  Short term goals  Time Frame for Short term goals: 2 Weeks  Short term goal 1: Patient to have no nystagmus noted with habituation ex's  Short term goal 2: Patient to report no dizziness with rolling over in bed  Short term goal 3: Patient to report no dizziness with forward bending activities  Short term goal 4: Patient to be independent with HEP  Long term goals  Time Frame for Long term goals : 4 Weeks  Long term goal 1: Patient to report no episodes of dizziness with grocery shopping  Long term goal 2: Patient to have no dizziness with any positional testing of ant/post/horizontal canals  Long term goal 3: Patient to report no dizziness with getting lower body dressed  Long term goal 4: Patient to demo increased function by scoring <= 60% impairment on PSFS   Patient Goals   Patient goals : decrease dizziness    Plan:    Plan  Times per week: 2x/week  Plan weeks: 4-6 weeks  Current Treatment Recommendations: ROM, Neuromuscular Re-education, Balance Training, Functional Mobility Training, Gait Training, Modalities, Positioning, Manual Therapy - Joint Manipulation, Endurance Training, Safety Education & Training, Manual Therapy - Soft Tissue Mobilization, Home Exercise Program (tera)  Timed Code Treatment Minutes: 40 Minutes (+10 min seated rest)     Therapy Time   Individual Concurrent Group Co-treatment   Time In 1410         Time Out 1500         Minutes 50         Timed Code Treatment Minutes: 40 Minutes (+10 min seated rest)       Ke Gonzales PT    Electronically signed by Ke Gonzales PT on 3/19/2018 at 3:00 PM

## 2018-03-22 ENCOUNTER — APPOINTMENT (OUTPATIENT)
Dept: PHYSICAL THERAPY | Age: 53
End: 2018-03-22
Payer: MEDICAID

## 2018-03-26 ENCOUNTER — HOSPITAL ENCOUNTER (OUTPATIENT)
Dept: PHYSICAL THERAPY | Age: 53
Setting detail: THERAPIES SERIES
Discharge: HOME | End: 2018-03-26
Payer: MEDICAID

## 2018-03-26 DIAGNOSIS — G89.4 CHRONIC PAIN SYNDROME: ICD-10-CM

## 2018-03-26 DIAGNOSIS — G71.00 MUSCULAR DYSTROPHY (HCC): ICD-10-CM

## 2018-03-26 PROCEDURE — 97110 THERAPEUTIC EXERCISES: CPT

## 2018-03-26 PROCEDURE — 95992 CANALITH REPOSITIONING PROC: CPT

## 2018-03-26 RX ORDER — HYDROCODONE BITARTRATE AND ACETAMINOPHEN 10; 325 MG/1; MG/1
1 TABLET ORAL EVERY 8 HOURS PRN
Qty: 90 TABLET | Refills: 0 | Status: SHIPPED | OUTPATIENT
Start: 2018-03-29 | End: 2018-05-01 | Stop reason: SDUPTHER

## 2018-03-26 NOTE — TELEPHONE ENCOUNTER
hailey called left message with request for refill on norco. Last office visit 2-1 with next scheduled appointment 5-1  Dose verified.    Last UDS  2-1    Last fill per 2-2  Tim Officer updated 1-3  Component Results     Component Collected Lab   Amphetamine Screen, Urine 02/01/2018  2:40 PM Unknown   neg    Comment:   MOP/Norc: Neg   Barbiturate Screen, Urine 02/01/2018  2:40 PM Unknown   neg    Benzodiazepine Screen, Urine 02/01/2018  2:40 PM Unknown   neg    COCAINE METABOLITE SCREEN URINE 02/01/2018  2:40 PM Unknown   neg    THC 02/01/2018  2:40 PM Unknown   neg    MDMA URINE 02/01/2018  2:40 PM Unknown   neg    Methadone Screen, Urine 02/01/2018  2:40 PM Unknown   neg    Opiate Scrn, Ur 02/01/2018  2:40 PM Unknown   neg    Oxycodone Screen, Ur 02/01/2018  2:40 PM Unknown   neg    PCP Scrn, Ur 02/01/2018  2:40 PM Unknown   neg    Propoxyphene Screen, Urine 02/01/2018  2:40 PM Unknown   neg    Tricyclic Antidepressants, Ur 02/01/2018  2:40 PM Unknown   neg    Methamphetamine, Urine 02/01/2018  2:40 PM Unknown   neg    Lab and Collection     POCT Rapid Drug Screen on 2/1/2018

## 2018-03-29 ENCOUNTER — APPOINTMENT (OUTPATIENT)
Dept: PHYSICAL THERAPY | Age: 53
End: 2018-03-29
Payer: MEDICAID

## 2018-04-02 ENCOUNTER — HOSPITAL ENCOUNTER (OUTPATIENT)
Dept: PHYSICAL THERAPY | Age: 53
Setting detail: THERAPIES SERIES
End: 2018-04-02
Payer: MEDICAID

## 2018-04-05 ENCOUNTER — APPOINTMENT (OUTPATIENT)
Dept: PHYSICAL THERAPY | Age: 53
End: 2018-04-05
Payer: MEDICAID

## 2018-04-09 ENCOUNTER — HOSPITAL ENCOUNTER (OUTPATIENT)
Dept: PHYSICAL THERAPY | Age: 53
Setting detail: THERAPIES SERIES
Discharge: HOME | End: 2018-04-09
Payer: MEDICAID

## 2018-04-09 PROCEDURE — 97112 NEUROMUSCULAR REEDUCATION: CPT

## 2018-04-12 ENCOUNTER — HOSPITAL ENCOUNTER (OUTPATIENT)
Dept: PHYSICAL THERAPY | Age: 53
Setting detail: THERAPIES SERIES
Discharge: HOME | End: 2018-04-12
Payer: MEDICAID

## 2018-04-12 PROCEDURE — 97112 NEUROMUSCULAR REEDUCATION: CPT

## 2018-04-12 ASSESSMENT — PAIN DESCRIPTION - PAIN TYPE: TYPE: CHRONIC PAIN

## 2018-04-12 ASSESSMENT — PAIN SCALES - GENERAL: PAINLEVEL_OUTOF10: 2

## 2018-04-17 ENCOUNTER — HOSPITAL ENCOUNTER (OUTPATIENT)
Dept: PHYSICAL THERAPY | Age: 53
Setting detail: THERAPIES SERIES
Discharge: HOME | End: 2018-04-17
Payer: MEDICAID

## 2018-04-17 PROCEDURE — G8979 MOBILITY GOAL STATUS: HCPCS

## 2018-04-17 PROCEDURE — G8980 MOBILITY D/C STATUS: HCPCS

## 2018-04-17 PROCEDURE — 97112 NEUROMUSCULAR REEDUCATION: CPT

## 2018-04-17 ASSESSMENT — PAIN DESCRIPTION - FREQUENCY: FREQUENCY: CONTINUOUS

## 2018-04-17 ASSESSMENT — PAIN DESCRIPTION - PAIN TYPE: TYPE: CHRONIC PAIN

## 2018-04-17 ASSESSMENT — PAIN DESCRIPTION - DESCRIPTORS: DESCRIPTORS: ACHING

## 2018-04-17 ASSESSMENT — PAIN SCALES - GENERAL: PAINLEVEL_OUTOF10: 3

## 2018-04-17 ASSESSMENT — PAIN DESCRIPTION - ORIENTATION: ORIENTATION: RIGHT;LEFT

## 2018-04-17 ASSESSMENT — PAIN DESCRIPTION - LOCATION: LOCATION: BACK;LEG

## 2018-04-19 ENCOUNTER — APPOINTMENT (OUTPATIENT)
Dept: PHYSICAL THERAPY | Age: 53
End: 2018-04-19
Payer: MEDICAID

## 2018-04-21 ENCOUNTER — APPOINTMENT (OUTPATIENT)
Dept: GENERAL RADIOLOGY | Age: 53
End: 2018-04-21
Payer: MEDICAID

## 2018-04-21 ENCOUNTER — HOSPITAL ENCOUNTER (EMERGENCY)
Age: 53
Discharge: HOME | End: 2018-04-21
Payer: MEDICAID

## 2018-04-21 VITALS
HEIGHT: 66 IN | SYSTOLIC BLOOD PRESSURE: 124 MMHG | BODY MASS INDEX: 34.39 KG/M2 | TEMPERATURE: 97.3 F | RESPIRATION RATE: 16 BRPM | WEIGHT: 214 LBS | OXYGEN SATURATION: 97 % | HEART RATE: 63 BPM | DIASTOLIC BLOOD PRESSURE: 84 MMHG

## 2018-04-21 DIAGNOSIS — S90.32XA CONTUSION OF LEFT FOOT, INITIAL ENCOUNTER: Primary | ICD-10-CM

## 2018-04-21 PROCEDURE — 6370000000 HC RX 637 (ALT 250 FOR IP): Performed by: NURSE PRACTITIONER

## 2018-04-21 PROCEDURE — 99283 EMERGENCY DEPT VISIT LOW MDM: CPT

## 2018-04-21 PROCEDURE — 73630 X-RAY EXAM OF FOOT: CPT

## 2018-04-21 PROCEDURE — 99284 EMERGENCY DEPT VISIT MOD MDM: CPT | Performed by: NURSE PRACTITIONER

## 2018-04-21 RX ORDER — HYDROCODONE BITARTRATE AND ACETAMINOPHEN 5; 325 MG/1; MG/1
1 TABLET ORAL ONCE
Status: COMPLETED | OUTPATIENT
Start: 2018-04-21 | End: 2018-04-21

## 2018-04-21 RX ADMIN — HYDROCODONE BITARTRATE AND ACETAMINOPHEN 1 TABLET: 5; 325 TABLET ORAL at 18:04

## 2018-04-21 ASSESSMENT — ENCOUNTER SYMPTOMS
GASTROINTESTINAL NEGATIVE: 1
RESPIRATORY NEGATIVE: 1

## 2018-04-21 ASSESSMENT — PAIN SCALES - GENERAL
PAINLEVEL_OUTOF10: 5
PAINLEVEL_OUTOF10: 8
PAINLEVEL_OUTOF10: 7

## 2018-04-21 ASSESSMENT — PAIN DESCRIPTION - DESCRIPTORS: DESCRIPTORS: ACHING

## 2018-04-21 ASSESSMENT — PAIN DESCRIPTION - PAIN TYPE: TYPE: ACUTE PAIN

## 2018-04-21 ASSESSMENT — PAIN DESCRIPTION - LOCATION: LOCATION: FOOT

## 2018-04-21 ASSESSMENT — PAIN DESCRIPTION - ORIENTATION: ORIENTATION: LEFT

## 2018-05-01 ENCOUNTER — OFFICE VISIT (OUTPATIENT)
Dept: PRIMARY CARE CLINIC | Age: 53
End: 2018-05-01
Payer: MEDICAID

## 2018-05-01 VITALS
TEMPERATURE: 97.2 F | HEART RATE: 104 BPM | BODY MASS INDEX: 36.26 KG/M2 | DIASTOLIC BLOOD PRESSURE: 76 MMHG | SYSTOLIC BLOOD PRESSURE: 130 MMHG | OXYGEN SATURATION: 98 % | WEIGHT: 225.6 LBS | HEIGHT: 66 IN

## 2018-05-01 DIAGNOSIS — G89.4 CHRONIC PAIN SYNDROME: ICD-10-CM

## 2018-05-01 DIAGNOSIS — G71.00 MUSCULAR DYSTROPHY (HCC): ICD-10-CM

## 2018-05-01 DIAGNOSIS — Z12.31 SCREENING MAMMOGRAM, ENCOUNTER FOR: ICD-10-CM

## 2018-05-01 DIAGNOSIS — R09.82 POSTNASAL DRIP: Primary | ICD-10-CM

## 2018-05-01 PROCEDURE — 99214 OFFICE O/P EST MOD 30 MIN: CPT | Performed by: FAMILY MEDICINE

## 2018-05-01 PROCEDURE — 80305 DRUG TEST PRSMV DIR OPT OBS: CPT | Performed by: FAMILY MEDICINE

## 2018-05-01 RX ORDER — HYDROCODONE BITARTRATE AND ACETAMINOPHEN 10; 325 MG/1; MG/1
1 TABLET ORAL EVERY 8 HOURS PRN
Qty: 90 TABLET | Refills: 0 | Status: SHIPPED | OUTPATIENT
Start: 2018-05-01 | End: 2018-06-07 | Stop reason: SDUPTHER

## 2018-05-01 RX ORDER — CETIRIZINE HYDROCHLORIDE 10 MG/1
10 TABLET ORAL DAILY
Qty: 30 TABLET | Refills: 3 | Status: SHIPPED | OUTPATIENT
Start: 2018-05-01 | End: 2018-08-01 | Stop reason: SDUPTHER

## 2018-05-01 RX ORDER — SIMVASTATIN 80 MG
80 TABLET ORAL NIGHTLY
Qty: 30 TABLET | Refills: 5 | Status: SHIPPED | OUTPATIENT
Start: 2018-05-01 | End: 2019-01-16

## 2018-05-01 ASSESSMENT — ENCOUNTER SYMPTOMS
COUGH: 0
SHORTNESS OF BREATH: 0
BACK PAIN: 1

## 2018-05-11 ENCOUNTER — HOSPITAL ENCOUNTER (OUTPATIENT)
Dept: NON INVASIVE DIAGNOSTICS | Age: 53
Discharge: HOME | End: 2018-05-11
Payer: MEDICAID

## 2018-05-11 DIAGNOSIS — G71.00 MUSCULAR DYSTROPHY (HCC): ICD-10-CM

## 2018-05-11 LAB
LV EF: 58 %
LVEF MODALITY: NORMAL

## 2018-05-11 PROCEDURE — 93306 TTE W/DOPPLER COMPLETE: CPT

## 2018-05-14 ENCOUNTER — TELEPHONE (OUTPATIENT)
Dept: PRIMARY CARE CLINIC | Age: 53
End: 2018-05-14

## 2018-05-18 ENCOUNTER — HOSPITAL ENCOUNTER (OUTPATIENT)
Dept: WOMENS IMAGING | Age: 53
Discharge: HOME | End: 2018-05-18
Payer: MEDICAID

## 2018-05-18 DIAGNOSIS — Z12.31 SCREENING MAMMOGRAM, ENCOUNTER FOR: ICD-10-CM

## 2018-05-18 PROCEDURE — 77063 BREAST TOMOSYNTHESIS BI: CPT

## 2018-05-24 ENCOUNTER — TELEPHONE (OUTPATIENT)
Dept: PRIMARY CARE CLINIC | Age: 53
End: 2018-05-24

## 2018-06-07 DIAGNOSIS — G89.4 CHRONIC PAIN SYNDROME: ICD-10-CM

## 2018-06-07 DIAGNOSIS — G71.00 MUSCULAR DYSTROPHY (HCC): ICD-10-CM

## 2018-06-08 RX ORDER — HYDROCODONE BITARTRATE AND ACETAMINOPHEN 10; 325 MG/1; MG/1
1 TABLET ORAL EVERY 8 HOURS PRN
Qty: 90 TABLET | Refills: 0 | Status: SHIPPED | OUTPATIENT
Start: 2018-06-08 | End: 2018-07-03 | Stop reason: SDUPTHER

## 2018-06-12 ENCOUNTER — TELEPHONE (OUTPATIENT)
Dept: PRIMARY CARE CLINIC | Age: 53
End: 2018-06-12

## 2018-06-19 ENCOUNTER — APPOINTMENT (OUTPATIENT)
Dept: GENERAL RADIOLOGY | Facility: HOSPITAL | Age: 53
End: 2018-06-19

## 2018-06-19 ENCOUNTER — APPOINTMENT (OUTPATIENT)
Dept: CT IMAGING | Facility: HOSPITAL | Age: 53
End: 2018-06-19

## 2018-06-19 ENCOUNTER — HOSPITAL ENCOUNTER (EMERGENCY)
Facility: HOSPITAL | Age: 53
Discharge: HOME | End: 2018-06-19
Attending: EMERGENCY MEDICINE | Admitting: EMERGENCY MEDICINE

## 2018-06-19 VITALS
HEIGHT: 65 IN | BODY MASS INDEX: 36.65 KG/M2 | RESPIRATION RATE: 17 BRPM | HEART RATE: 87 BPM | TEMPERATURE: 97.4 F | DIASTOLIC BLOOD PRESSURE: 96 MMHG | WEIGHT: 220 LBS | SYSTOLIC BLOOD PRESSURE: 139 MMHG | OXYGEN SATURATION: 98 %
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
## 2018-06-19 DIAGNOSIS — S92.902A CLOSED FRACTURE OF LEFT FOOT, INITIAL ENCOUNTER: ICD-10-CM

## 2018-06-19 DIAGNOSIS — S00.03XA CONTUSION OF SCALP, INITIAL ENCOUNTER: Primary | ICD-10-CM

## 2018-06-19 PROCEDURE — 73630 X-RAY EXAM OF FOOT: CPT

## 2018-06-19 PROCEDURE — 99284 EMERGENCY DEPT VISIT MOD MDM: CPT

## 2018-06-19 PROCEDURE — 73610 X-RAY EXAM OF ANKLE: CPT

## 2018-06-19 PROCEDURE — 72125 CT NECK SPINE W/O DYE: CPT

## 2018-06-19 PROCEDURE — 70450 CT HEAD/BRAIN W/O DYE: CPT

## 2018-06-20 NOTE — ED NOTES
Pt report coming out of bathroom and lost balance and fell. Pt reports she hit the back of her head twice and denies LOC. Pt reports right ankle pain.     Tina Granado RN  06/19/18 1915

## 2018-06-20 NOTE — ED PROVIDER NOTES
Subjective   Patient is a 53-year-old female who presents with fall.  History of muscular dystrophy.  She has frequent falls but today lost her balance.  She states she fell backwards and hit the back of her head.  Her head bounced twice according to her.  No LOC.  Denies neck pain or back pain.  She states she twisted her right ankle and complains of pain over the right lateral side and the top of her left foot.  Denies any numbness or weakness.  Denies any focal neurologic changes.  She does have a slight headache on the back of her head.  Denies any vision changes.            Review of Systems   Constitutional: Negative for chills, diaphoresis, fatigue and fever.   HENT: Negative for sore throat.    Eyes: Negative for visual disturbance.   Respiratory: Negative for shortness of breath.    Cardiovascular: Negative for chest pain, palpitations and leg swelling.   Gastrointestinal: Negative for abdominal pain, constipation, diarrhea, nausea and vomiting.   Genitourinary: Negative for hematuria.   Musculoskeletal: Negative for back pain.   Skin: Negative for rash.   Neurological: Positive for headaches. Negative for dizziness, tremors, seizures, syncope, speech difficulty, weakness and light-headedness.   Psychiatric/Behavioral: Negative for confusion.       Past Medical History:   Diagnosis Date   • Cancer     uterine   • Depression    • Dizziness    • History of pancreatitis     15 YRS AGO   • History of uterine cancer    • Hyperlipemia    • Incontinence of urine    • Muscular dystrophy    • Myotonic dystrophy    • Ptosis, both eyelids    • Sleep apnea     NO MACHINE       Allergies   Allergen Reactions   • Neosporin [Neomycin-Bacitracin Zn-Polymyx] Hives     AND ITCHING   • Percocet [Oxycodone-Acetaminophen] Itching   • Percodan [Oxycodone-Aspirin] Itching   • Tramadol Itching       Past Surgical History:   Procedure Laterality Date   • ANKLE SURGERY Right    • APPENDECTOMY     • CATARACT EXTRACTION Bilateral      X2   •  SECTION     • CHOLECYSTECTOMY     • CLOSED REDUCTION METATARSAL FRACTURE     • COLONOSCOPY  2009   • COLONOSCOPY N/A 2017    Procedure: COLONOSCOPY WITH ANESTHESIA;  Surgeon: Hunter Coats MD;  Location:  [facility];  Service:    • ENDOSCOPY  2014   • ENDOSCOPY N/A 2017    Procedure: ESOPHAGOGASTRODUODENOSCOPY WITH ANESTHESIA;  Surgeon: Hunter Coats MD;  Location:  [facility];  Service:    • EYE PTOSIS REPAIR     • FOOT SURGERY Left    • FRONTALIS SUSPENSION Bilateral 2017    Procedure: BILATERAL UPPER LID FRONTALIS SUSPENSION WITH FASCIA;  Surgeon: Julius Hernandez MD;  Location:  [facility];  Service:    • HYSTERECTOMY     • NECK SURGERY         Family History   Problem Relation Age of Onset   • Colon cancer Cousin    • Colon polyps Neg Hx    • Malig Hyperthermia Neg Hx        Social History     Social History   • Marital status:      Social History Main Topics   • Smoking status: Current Every Day Smoker     Packs/day: 0.50     Years: 30.00     Types: Cigarettes   • Smokeless tobacco: Never Used   • Alcohol use No   • Drug use: No   • Sexual activity: Defer     Other Topics Concern   • Not on file       Lab Results (last 24 hours)     ** No results found for the last 24 hours. **          Objective   Physical Exam   Constitutional: She is oriented to person, place, and time. She appears well-nourished. No distress.   HENT:   Head: Head is with contusion.       Right Ear: Tympanic membrane normal.   Left Ear: Tympanic membrane normal.   Nose: No nasal septal hematoma.   Mouth/Throat: Uvula is midline, oropharynx is clear and moist and mucous membranes are normal.   Eyes: EOM are normal. Pupils are equal, round, and reactive to light. Right eye exhibits normal extraocular motion and no nystagmus. Left eye exhibits normal extraocular motion and no nystagmus.   Neck: Normal range of motion. Neck supple. No spinous process tenderness and no  muscular tenderness present. No neck rigidity. No edema, no erythema and normal range of motion present.   Cardiovascular: Normal rate, regular rhythm and normal heart sounds.  Exam reveals no gallop and no friction rub.    No murmur heard.  Pulmonary/Chest: Effort normal and breath sounds normal. No respiratory distress. She exhibits no mass, no tenderness and no crepitus.   Abdominal: Soft. There is no tenderness.   Musculoskeletal: She exhibits no edema.        Right shoulder: Normal.        Left shoulder: Normal.        Right elbow: Normal.       Left elbow: Normal.        Right wrist: Normal.        Left wrist: Normal.        Right hip: Normal.        Left hip: Normal.        Right knee: Normal.        Left knee: Normal.        Left ankle: Normal.        Cervical back: Normal.        Thoracic back: Normal.        Lumbar back: Normal.        Right foot: Normal.        Neurological: She is alert and oriented to person, place, and time. She has normal strength. No cranial nerve deficit or sensory deficit. GCS eye subscore is 4. GCS verbal subscore is 5. GCS motor subscore is 6.   Skin: Skin is warm and dry.   Psychiatric: She has a normal mood and affect.   Nursing note and vitals reviewed.      Procedures         CT Cervical Spine Without Contrast   Final Result   1. No evidence of acute osseous injury or malalignment in the cervical   spine.           This report was finalized on 06/19/2018 20:30 by Dr. Bo King MD.      CT Head Without Contrast   Final Result   1. No acute intracranial process.           This report was finalized on 06/19/2018 20:28 by Dr. Bo King MD.      XR Foot 3+ View Left   Final Result   1. Acute, intra-articular fracture through the first proximal phalanx.   This extends into the first MTP joint.   2. An effusion is seen in the first MTP joint.   This report was finalized on 06/19/2018 19:58 by Dr. Bo King MD.      XR Ankle 3+ View Right   Final Result   1. Remote  "postsurgical changes in the RIGHT ankle without evidence of   acute bony abnormality.           This report was finalized on 06/19/2018 19:57 by Dr. Bo King MD.          /64   Pulse 78   Temp 97.9 °F (36.6 °C) (Oral)   Resp 17   Ht 165.1 cm (65\")   Wt 99.8 kg (220 lb)   SpO2 99%   BMI 36.61 kg/m²     ED Course    ED Course as of Jun 19 2048 Tue Jun 19, 2018 2038 This is a 53-year-old female with muscular dystrophy who has frequent falls presents with fall.  Her head CT and C-spine CT showed no acute change.  Right ankle has contusion but no bony injury.  Left foot shows acute intra-articular fracture through first proximal phalanx.  We will place her in a hard sole shoe and have her follow-up in 1 week.  [TH]      ED Course User Index  [TH] Yovani Del Toro MD       Medications - No data to display         MDM  Number of Diagnoses or Management Options  Closed fracture of left foot, initial encounter: new and requires workup  Contusion of scalp, initial encounter: new and requires workup  Fall, initial encounter: new and requires workup     Amount and/or Complexity of Data Reviewed  Clinical lab tests: ordered and reviewed  Tests in the radiology section of CPT®: ordered and reviewed    Risk of Complications, Morbidity, and/or Mortality  Presenting problems: low  Diagnostic procedures: low  Management options: low    Patient Progress  Patient progress: stable      Final diagnoses:   Contusion of scalp, initial encounter   Closed fracture of left foot, initial encounter   Fall, initial encounter          Yovani Del Toro MD  06/19/18 2042       Yovani Del Toro MD  06/19/18 2048    "

## 2018-07-03 DIAGNOSIS — G71.00 MUSCULAR DYSTROPHY (HCC): ICD-10-CM

## 2018-07-03 DIAGNOSIS — G89.4 CHRONIC PAIN SYNDROME: ICD-10-CM

## 2018-07-03 RX ORDER — HYDROCODONE BITARTRATE AND ACETAMINOPHEN 10; 325 MG/1; MG/1
1 TABLET ORAL EVERY 8 HOURS PRN
Qty: 90 TABLET | Refills: 0 | Status: SHIPPED | OUTPATIENT
Start: 2018-07-08 | End: 2018-08-01 | Stop reason: SDUPTHER

## 2018-07-03 NOTE — TELEPHONE ENCOUNTER
SUN was reviewed today per office protocol. Report shows No discrepancies. Fill pattern is consistent from single provider(s) at single pharmacy(s).     Presciption Escribed

## 2018-07-03 NOTE — TELEPHONE ENCOUNTER
Pt requesting a refill on [place]. Dose verified. Last fill date 6/8/2018. Last OV 5/1/2018.  Next OV 8/1/2018  Amphetamine Screen, Urine 05/01/2018  1:55 PM Unknown   neg    Comment:   MOP/Norco:Positive   Barbiturate Screen, Urine 05/01/2018  1:55 PM Unknown   neg    Benzodiazepine Screen, Urine 05/01/2018  1:55 PM Unknown   neg    COCAINE METABOLITE SCREEN URINE 05/01/2018  1:55 PM Unknown   neg    THC 05/01/2018  1:55 PM Unknown   neg    MDMA URINE 05/01/2018  1:55 PM Unknown   neg    Methadone Screen, Urine 05/01/2018  1:55 PM Unknown   neg    Opiate Scrn, Ur 05/01/2018  1:55 PM Unknown   neg    Oxycodone Screen, Ur 05/01/2018  1:55 PM Unknown   neg    PCP Scrn, Ur 05/01/2018  1:55 PM Unknown   neg    Propoxyphene Screen, Urine 05/01/2018  1:55 PM Unknown   neg    Tricyclic Antidepressants, Ur 05/01/2018  1:55 PM Unknown   neg    Methamphetamine, Urine 05/01/2018  1:55 PM Unknown   neg    Lab and Collection     POCT Rapid Drug Screen on 5/1/2018

## 2018-07-16 ENCOUNTER — OFFICE VISIT (OUTPATIENT)
Dept: PRIMARY CARE CLINIC | Age: 53
End: 2018-07-16
Payer: MEDICAID

## 2018-07-16 ENCOUNTER — TELEPHONE (OUTPATIENT)
Dept: PRIMARY CARE CLINIC | Age: 53
End: 2018-07-16

## 2018-07-16 DIAGNOSIS — R82.90 BAD ODOR OF URINE: ICD-10-CM

## 2018-07-16 DIAGNOSIS — R39.89 URINE DISCOLORATION: ICD-10-CM

## 2018-07-16 DIAGNOSIS — R30.9 PAINFUL URINATION: ICD-10-CM

## 2018-07-16 DIAGNOSIS — R39.89 URINE DISCOLORATION: Primary | ICD-10-CM

## 2018-07-16 LAB
APPEARANCE FLUID: ABNORMAL
BILIRUBIN, POC: ABNORMAL
BLOOD URINE, POC: ABNORMAL
CLARITY, POC: ABNORMAL
COLOR, POC: ABNORMAL
GLUCOSE URINE, POC: ABNORMAL
KETONES, POC: ABNORMAL
LEUKOCYTE EST, POC: POSITIVE
NITRITE, POC: ABNORMAL
PH, POC: 5.5
PROTEIN, POC: 100
SPECIFIC GRAVITY, POC: 1.02
UROBILINOGEN, POC: 0.2

## 2018-07-16 PROCEDURE — 81002 URINALYSIS NONAUTO W/O SCOPE: CPT | Performed by: FAMILY MEDICINE

## 2018-07-16 RX ORDER — NITROFURANTOIN 25; 75 MG/1; MG/1
100 CAPSULE ORAL 2 TIMES DAILY
Qty: 10 CAPSULE | Refills: 0 | Status: SHIPPED | OUTPATIENT
Start: 2018-07-16 | End: 2018-07-21

## 2018-07-16 NOTE — TELEPHONE ENCOUNTER
This [facility] called and gave the patients their positive POCT Urine results. Patient stated understanding.

## 2018-07-16 NOTE — PROGRESS NOTES
Patients caregiver came in with urine stating that the patient was having trouble urinating, it had a bad odor when she did urinate, and it was very painful. UA was done and it came back positive, antibiotic was sent in and the urine was sent for a culture.

## 2018-07-18 ENCOUNTER — TELEPHONE (OUTPATIENT)
Dept: PRIMARY CARE CLINIC | Age: 53
End: 2018-07-18

## 2018-07-18 LAB
ORGANISM: ABNORMAL
URINE CULTURE, ROUTINE: ABNORMAL
URINE CULTURE, ROUTINE: ABNORMAL

## 2018-07-18 NOTE — TELEPHONE ENCOUNTER
This [facility] called and gave the patients their lab results. Patient stated understanding.

## 2018-08-01 ENCOUNTER — OFFICE VISIT (OUTPATIENT)
Dept: PRIMARY CARE CLINIC | Age: 53
End: 2018-08-01
Payer: MEDICAID

## 2018-08-01 VITALS
DIASTOLIC BLOOD PRESSURE: 76 MMHG | WEIGHT: 220.6 LBS | OXYGEN SATURATION: 97 % | BODY MASS INDEX: 36.75 KG/M2 | SYSTOLIC BLOOD PRESSURE: 120 MMHG | TEMPERATURE: 97.5 F | HEIGHT: 65 IN | HEART RATE: 88 BPM

## 2018-08-01 DIAGNOSIS — F41.9 ANXIETY AND DEPRESSION: ICD-10-CM

## 2018-08-01 DIAGNOSIS — G89.4 CHRONIC PAIN SYNDROME: Primary | ICD-10-CM

## 2018-08-01 DIAGNOSIS — G71.00 MUSCULAR DYSTROPHY (HCC): ICD-10-CM

## 2018-08-01 DIAGNOSIS — F32.A ANXIETY AND DEPRESSION: ICD-10-CM

## 2018-08-01 LAB

## 2018-08-01 PROCEDURE — 80305 DRUG TEST PRSMV DIR OPT OBS: CPT | Performed by: FAMILY MEDICINE

## 2018-08-01 PROCEDURE — 99214 OFFICE O/P EST MOD 30 MIN: CPT | Performed by: FAMILY MEDICINE

## 2018-08-01 RX ORDER — HYDROCODONE BITARTRATE AND ACETAMINOPHEN 10; 325 MG/1; MG/1
1 TABLET ORAL EVERY 8 HOURS PRN
Qty: 90 TABLET | Refills: 0 | Status: SHIPPED | OUTPATIENT
Start: 2018-08-01 | End: 2018-08-31

## 2018-08-01 RX ORDER — SERTRALINE HYDROCHLORIDE 100 MG/1
150 TABLET, FILM COATED ORAL DAILY
Qty: 135 TABLET | Refills: 1 | Status: SHIPPED | OUTPATIENT
Start: 2018-08-01 | End: 2018-11-02

## 2018-08-01 RX ORDER — CETIRIZINE HYDROCHLORIDE 10 MG/1
10 TABLET ORAL DAILY
Qty: 30 TABLET | Refills: 3 | Status: SHIPPED | OUTPATIENT
Start: 2018-08-01 | End: 2018-11-02 | Stop reason: ALTCHOICE

## 2018-08-01 ASSESSMENT — ENCOUNTER SYMPTOMS
BACK PAIN: 1
COUGH: 0
SHORTNESS OF BREATH: 0

## 2018-08-01 NOTE — PROGRESS NOTES
Rissa Reed is a 47 y.o. female    Chief Complaint   Patient presents with    Follow-up     3 months       HPI  Rissa Reed has a long sitting history of muscular dystrophy. This is been progressively worsening. Patient also has long-standing history of chronic pain syndrome anxiety. Patient is currently on [address] one by mouth 3 times a day. Patient states her pain is stable at this time. Patient also has a long-standing history of anxiety and depression. Patient is currently on Zoloft 150 mg by mouth daily. Patient states her mood is doing well. Review of Systems   Constitutional: Negative for chills and fever. Respiratory: Negative for cough and shortness of breath. Cardiovascular: Negative for chest pain and leg swelling. Musculoskeletal: Positive for back pain and myalgias. Prior to Admission medications    Medication Sig Start Date End Date Taking? Authorizing Provider   HYDROcodone-acetaminophen (NORCO)  MG per tablet Take 1 tablet by mouth every 8 hours as needed for Pain for up to 30 days. . 8/1/18 8/31/18 Yes Debbie Ridley MD   sertraline (ZOLOFT) 100 MG tablet Take 1.5 tablets by mouth daily 8/1/18  Yes Debbie Ridley MD   cetirizine (ZYRTEC ALLERGY) 10 MG tablet Take 1 tablet by mouth daily 8/1/18  Yes Debbie Ridley MD   simvastatin (ZOCOR) 80 MG tablet Take 1 tablet by mouth nightly 5/1/18  Yes Debbie Ridley MD   guaiFENesin (MUCINEX) 600 MG extended release tablet Take 1,200 mg by mouth 2 times daily   Yes Historical Provider, MD   carboxymethylcellulose 1 % ophthalmic solution 1 drop 3 times daily   Yes Historical Provider, MD   Diapers & Supplies MISC pullups wipes and bed pads.  One month supply-patient needs appointment 3/3/17  Yes Historical Provider, MD       Past Medical History:   Diagnosis Date    Anxiety     Depression     Hyperlipidemia     Muscular dystrophy ([address]     Urinary incontinence        Past Surgical History: Procedure Laterality Date    APPENDECTOMY      CATARACT REMOVAL       SECTION      CHOLECYSTECTOMY      COLONOSCOPY  2017    Dr. Teola Crigler      mariela in right leg    NECK SURGERY      extra ligament       Social History     Social History    Marital status:      Spouse name: N/A    Number of children: N/A    Years of education: N/A     Social History Main Topics    Smoking status: Current Some Day Smoker     Packs/day: 0.50     Years: 20.00     Types: Cigarettes    Smokeless tobacco: Former User      Comment: used an electronic cig/smokes marijuana    Alcohol use No    Drug use: No    Sexual activity: Not Asked     Other Topics Concern    None     Social History Narrative    None       Physical Exam   Constitutional: She is oriented to person, place, and time. She appears well-developed and well-nourished. No distress. HENT:   Head: Normocephalic and atraumatic. Neck: Normal range of motion. Cardiovascular: Normal rate and regular rhythm. Pulmonary/Chest: Effort normal and breath sounds normal. She has no wheezes. She has no rales. Abdominal: Soft. She exhibits no distension. There is no tenderness. There is no rebound. Musculoskeletal: Normal range of motion. Tenderness over xiphoid process   Neurological: She is alert and oriented to person, place, and time. Gait abnormal.   Generalized weakness related to muscular dystrophy. In wheelchair     Skin: Skin is warm and dry. No rash noted. Psychiatric: She has a normal mood and affect. Her behavior is normal.   Nursing note and vitals reviewed. Assessment    ICD-10-CM ICD-9-CM    1. Chronic pain syndrome G89.4 338.4 POCT Rapid Drug Screen- Appropriate      HYDROcodone-acetaminophen (NORCO)  MG per tablet 1 po TID. No Sign of abuse. No signs of misuse.     2. Muscular dystrophy (HCC) G71.0 359.1 POCT Rapid Drug Screen      HYDROcodone-acetaminophen (NORCO)  MG per tablet

## 2018-08-02 ENCOUNTER — TELEPHONE (OUTPATIENT)
Dept: PRIMARY CARE CLINIC | Age: 53
End: 2018-08-02

## 2018-08-13 ENCOUNTER — OFFICE VISIT (OUTPATIENT)
Dept: NEUROLOGY | Age: 53
End: 2018-08-13
Payer: MEDICAID

## 2018-08-13 VITALS
HEIGHT: 66 IN | WEIGHT: 220 LBS | SYSTOLIC BLOOD PRESSURE: 120 MMHG | DIASTOLIC BLOOD PRESSURE: 80 MMHG | BODY MASS INDEX: 35.36 KG/M2

## 2018-08-13 DIAGNOSIS — R25.2 CRAMP IN MUSCLE: ICD-10-CM

## 2018-08-13 DIAGNOSIS — G71.11: Primary | ICD-10-CM

## 2018-08-13 DIAGNOSIS — Z86.39 HISTORY OF HYPERCHOLESTEROLEMIA: ICD-10-CM

## 2018-08-13 PROCEDURE — 99214 OFFICE O/P EST MOD 30 MIN: CPT | Performed by: PSYCHIATRY & NEUROLOGY

## 2018-08-14 NOTE — PROGRESS NOTES
Chief Complaint   Patient presents with    1 Year Follow Up     i was in the hospital approx 3 months ago because of a fall        Jef Lizama is a 47y.o. 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 This is her annual follow-up. . I diagnosed her with this in 2007. She is on disability for it. She sees her cardiologist about once a year. She feels that she is slowly getting weaker and losing more muscle mass. She lives in an assisted living type facility. No new medical difficulties since seen here last. Perhaps slowly getting weaker generally speaking. We had an extended discussion regarding all of the above. .she has chronic right foot pain as well which is unchanged.     Active Ambulatory Problems     Diagnosis Date Noted    Hyperlipidemia     Anxiety     Muscular dystrophy ([address]     Shoulder pain, right 2012    Trichimoniasis 2013    Cystocele 2013    Incontinence of urine 2013    Chronic foot pain 2017    Reactive depression 2017    Superficial injury of eyelid with infection 2017    Hypokalemia 2017    Encephalopathy, metabolic     Pneumonia of left lower lobe due to infectious organism ([address] 2017    Acute respiratory failure with hypoxia and hypercapnia ([address] 2017    Acquired ptosis of both eyelids 2017    History of plastic surgery 2017    Blurring of visual image of both eyes 2017    Pneumonia due to organism      Resolved Ambulatory Problems     Diagnosis Date Noted    Depression 2015     Past Medical History:   Diagnosis Date    Anxiety     Depression     Hyperlipidemia     Muscular dystrophy ([address]     Urinary incontinence        Past Surgical History:   Procedure Laterality Date    APPENDECTOMY      CATARACT REMOVAL       SECTION      CHOLECYSTECTOMY      COLONOSCOPY  2017    Dr. Marguerite Barragan      mariela in right leg Dysuria [] Frequency  [] Incontinence [] Urgency   [x] Denies all unless marked  Musculoskeletal: [] Arthralgia  [] Myalgias [] Muscle cramps  [] Muscle twitches   [x] Denies all unless marked   Extremities:   [] Pain   [] Swelling   [x] Denies all unless marked  Skin:[] Rash  [] Color Change  [x] Denies all unless marked  Neurological:[] Visual Disturbance [] Double Vision [] Slurred Speech [] Trouble swallowing  [] Vertigo [] Tingling [] Numbness [] Weakness [] Loss of Balance   [] Loss of Consciousness [] Memory Loss [] Seizures  [x] Denies all unless marked  Psychiatric/Behavioral:[] Depression [] Anxiety  [x] Denies all unless marked  Sleep: []  Insomnia [] Sleep Disturbance [] Snoring [] Restless Legs [] Daytime Sleepiness [] Sleep Apnea  [x] Denies all unless marked       Current Outpatient Prescriptions   Medication Sig Dispense Refill    HYDROcodone-acetaminophen (NORCO)  MG per tablet Take 1 tablet by mouth every 8 hours as needed for Pain for up to 30 days. . 90 tablet 0    sertraline (ZOLOFT) 100 MG tablet Take 1.5 tablets by mouth daily 135 tablet 1    cetirizine (ZYRTEC ALLERGY) 10 MG tablet Take 1 tablet by mouth daily 30 tablet 3    simvastatin (ZOCOR) 80 MG tablet Take 1 tablet by mouth nightly 30 tablet 5    guaiFENesin (MUCINEX) 600 MG extended release tablet Take 1,200 mg by mouth 2 times daily      carboxymethylcellulose 1 % ophthalmic solution 1 drop 3 times daily      Diapers & Supplies MISC pullups wipes and bed pads. One month supply-patient needs appointment       No current facility-administered medications for this visit. /80   Ht 5' 6\" (1.676 m)   Wt 220 lb (99.8 kg)   LMP  (LMP Unknown)   BMI 35.51 kg/m²       Constitutional  well developed, well nourished.     Eyes  conjunctiva normal.  Pupils react to light  Ear, nose, throat -hearing intact to finger rub No scars, masses, or lesions over external nose or ears, no atrophy of tongue  Neck-symmetric, no masses noted, no jugular vein distension. No bruits noted. Respiration- chest wall appears symmetric, good expansion,   normal effort without use of accessory muscles  Cardiovascular- RRR  Musculoskeletal  no significant wasting of muscles noted, no bony deformities, gait no gross ataxia  Extremities-no clubbing, cyanosis or edema  Skin  warm, dry, and intact. No rash, erythema, or pallor. Psychiatric  mood, affect, and behavior appear normal.      Neurological exam  Awake, alert, fluent oriented x 3 appropriate affect  Attention and concentration appear appropriate  Recent and remote memory appears unremarkable  Speech  with dysarthria  No clear issues with language of fund of knowledge    Cranial Nerve Exam   CN II- Visual fields grossly unremarkable. VA adequate. PERRLA. CN III, IV,VI-EOMI, No nystagmus, conjugate eye movements, bilateral ptosis  CN VII-she has an inverted \"V\" mouth. Bilateral eyelid ptosis and weakness of all facial muscles  CN VIII-Hearing intact to finger rub  CN IX and X- Palate elevates in midline. High arched palate  CN XI-good shoulder shrug  CN XII-Tongue midline with no fasciculations or fibrillations    Motor Exam  Decreased tone and muscle atrophy throughout. Decreased finger flexion bilaterally and mild decreased weakness throughout    Reflexes   Absent throughout  No clonus ankles  No Crawford's sign bilateral hands. No Babinski sign. Tremors- no tremors in hands or head noted    Gait  She can stand with assistance but is unable to walk.  The wheelchair    Coordination  Finger to nose and DREW-unremarkable    No results found for: [id number]  Lab Results   Component Value Date    WBC 8.4 09/04/2017    HGB 14.2 09/04/2017    HCT 45.5 09/04/2017    MCV 98.1 09/04/2017     09/04/2017     Lab Results   Component Value Date     09/05/2017    K 4.1 09/05/2017     09/05/2017    CO2 25 09/05/2017    BUN 6 09/05/2017    CREATININE 0.3 (L) 09/05/2017    GLUCOSE 149

## 2018-08-28 ENCOUNTER — OFFICE VISIT (OUTPATIENT)
Dept: GASTROENTEROLOGY | Facility: CLINIC | Age: 53
End: 2018-08-28

## 2018-08-28 VITALS
TEMPERATURE: 97.4 F | OXYGEN SATURATION: 99 % | HEART RATE: 98 BPM | SYSTOLIC BLOOD PRESSURE: 146 MMHG | BODY MASS INDEX: 35.82 KG/M2 | HEIGHT: 65 IN | DIASTOLIC BLOOD PRESSURE: 96 MMHG | WEIGHT: 215 LBS

## 2018-08-28 DIAGNOSIS — K52.9 CHRONIC DIARRHEA: ICD-10-CM

## 2018-08-28 DIAGNOSIS — K31.7 POLYP OF DUODENUM: Primary | ICD-10-CM

## 2018-08-28 DIAGNOSIS — Z80.0 FAMILY HX OF COLON CANCER: ICD-10-CM

## 2018-08-28 PROCEDURE — 99213 OFFICE O/P EST LOW 20 MIN: CPT | Performed by: INTERNAL MEDICINE

## 2018-08-28 NOTE — PROGRESS NOTES
[facility]    Chief Complaint   Patient presents with   • Colonoscopy       Subjective     HPI    Valencia Fonseca is a 53 y.o. female who presents with a chief complaint of  intestinal polyps.    She came in for checkup of her history of intestinal polyps.  She was wondering if she needed a follow-up scope as she has a history of duodenal polyp in  that was adenomatous.  She had an endoscopy and colonoscopy last year.  A diminutive benign-appearing polyp was destroyed in the second portion of the duodenum.  A small polyp was removed from her colon.  I suggested a surveillance colonoscopy 5 years after that colonoscopy and follow-up endoscopy 3 years after the procedure.       She denies any troubles.  She does have some chronic diarrhea which she is taking Lomotil for many years.  Her bowel habits have not changed.  She denies abdominal pain.  She has had no blood in her stools.  Her weights been stable.  Everything is going well for him.    Past Medical History:   Diagnosis Date   • Cancer (CMS/HCC)     uterine   • Depression    • Dizziness    • History of pancreatitis     15 YRS AGO   • History of uterine cancer    • Hyperlipemia    • Incontinence of urine    • Muscular dystrophy (CMS/HCC)    • Myotonic dystrophy (CMS/HCC)    • Ptosis, both eyelids    • Sleep apnea     NO MACHINE       Past Surgical History:   Procedure Laterality Date   • ANKLE SURGERY Right    • APPENDECTOMY     • CATARACT EXTRACTION Bilateral     X2   •  SECTION     • CHOLECYSTECTOMY     • CLOSED REDUCTION METATARSAL FRACTURE     • COLONOSCOPY  2009   • COLONOSCOPY N/A 2017    Procedure: COLONOSCOPY WITH ANESTHESIA;  Surgeon: Hutner Coats MD;  Location: [facility] ENDOSCOPY;  Service:    • ENDOSCOPY  2014   • ENDOSCOPY N/A 2017    Procedure: ESOPHAGOGASTRODUODENOSCOPY WITH ANESTHESIA;  Surgeon: Hunter Coats MD;  Location: [facility] ENDOSCOPY;  Service:    • EYE PTOSIS REPAIR     • FOOT  SURGERY Left    • FRONTALIS SUSPENSION Bilateral 8/31/2017    Procedure: BILATERAL UPPER LID FRONTALIS SUSPENSION WITH FASCIA;  Surgeon: Julius Hernandez MD;  Location: [facility] [place];  Service:    • HYSTERECTOMY     • NECK SURGERY           Current Outpatient Prescriptions:   •  Diapers & Supplies misc, pullups wipes and bed pads. One month supply-patient needs appointment, Disp: , Rfl:   •  diphenoxylate-atropine (LOMOTIL) 2.5-0.025 MG per tablet, Take  by mouth As Needed., Disp: , Rfl:   •  HYDROcodone-acetaminophen (NORCO)  MG per tablet, Take 1 tablet by mouth Every 6 (Six) Hours As Needed for Moderate Pain ., Disp: , Rfl:   •  sertraline (ZOLOFT) 100 MG tablet, Take 100 mg by mouth Daily., Disp: , Rfl:     Allergies   Allergen Reactions   • Neosporin [Neomycin-Bacitracin Zn-Polymyx] Hives     AND ITCHING   • Percocet [Oxycodone-Acetaminophen] Itching   • Percodan [Oxycodone-Aspirin] Itching   • Tramadol Itching       Social History     Social History   • Marital status:      Spouse name: N/A   • Number of children: N/A   • Years of education: N/A     Occupational History   • Not on file.     Social History Main Topics   • Smoking status: Current Every Day Smoker     Packs/day: 0.50     Years: 30.00     Types: Cigarettes   • Smokeless tobacco: Never Used   • Alcohol use No   • Drug use: No   • Sexual activity: Defer     Other Topics Concern   • Not on file     Social History Narrative   • No narrative on file       Family History   Problem Relation Age of Onset   • Colon cancer Cousin    • Colon polyps Neg Hx    • Malig Hyperthermia Neg Hx        Review of Systems  General no fever chills or sweats weight stable  Gastrointestinal: Not present-abdominal pain, constipation, diarrhea, dysphagia, hematemesis, melena, odynophagia, nausea, vomiting, pyrosis, regurgitation, hematochezia,    Objective     Vitals:    08/28/18 1328   BP: 146/96   Pulse: 98   Temp: 97.4 °F (36.3 °C)   SpO2: 99%        Physical Exam  No acute distress. Vital signs as documented. Skin warm and dry and without overt rashes. EOMI, sclera anicteric.  Neck without JVD or masses. Lungs clear to auscultation bilaterally, no rales. Heart exam notable for regular rhythm, normal sounds and absence of loud murmurs, rubs or gallops. Abdomen is soft, nontender, non distended, normal bowel sounds and without evidence of organomegaly, masses, or abdominal aortic enlargement.  Neuro, she is wheelchair-bound      Assessment/Plan   Problem List Items Addressed This Visit        Digestive    Chronic diarrhea    Polyp of duodenum - Primary    Overview     Abdomen was polyp removed by Dr. Segura in 2501, 2014 endoscopy revealed two 2 mm diminutive polyps destroyed, benign appearance.  Endoscopy April 2017 diminutive polyp destroyed duodenum, benign-appearing            Other    Family hx of colon cancer    Overview     Colonoscopy April 2017, small polyp removed, recommend surveillance colonoscopy in April 2022 approximately                 She is stable and without any change in her chronic diarrhea.  I reinforced a healthy diet.  Regards to her colon polyps and family history of colon cancer I suggested a surveillance colonoscopy in April 2022.  In regards to her history of duodenal adenomas polyps I suggested an endoscopy exam in April 2020 approximately.  Sooner if she were to develop any signs or symptoms to suggest otherwise and I discussed what she needs to watch and contact us if she should develop.  She expressed understanding and is in agreement with this approach.  We will see her back in the office as needed.    Continue ongoing management by primary care provider and other specialists.     Patient's Body mass index is 35.78 kg/m². BMI is above normal parameters. Recommendations include: nutrition counseling.        EMR Dragon/transcription disclaimer:  Much of this encounter note is electronic transcription/translation of spoken  language to printed text.  The electronic translation of spoken language may be erroneous, or at times, nonsensical words or phrases may be inadvertently transcribed.  Although I have reviewed the note for such errors, some may still exist.    Hunter Coats MD  4:39 PM  08/28/18

## 2018-09-05 DIAGNOSIS — G89.4 CHRONIC PAIN SYNDROME: Primary | ICD-10-CM

## 2018-09-05 RX ORDER — HYDROCODONE BITARTRATE AND ACETAMINOPHEN 10; 325 MG/1; MG/1
1 TABLET ORAL EVERY 8 HOURS PRN
Qty: 90 TABLET | Refills: 0 | Status: SHIPPED | OUTPATIENT
Start: 2018-09-07 | End: 2018-10-09 | Stop reason: SDUPTHER

## 2018-09-05 NOTE — TELEPHONE ENCOUNTER
Pineda Goon called 09/05/18 with request for refill on Norco. Last office visit 08/01/18 with next scheduled appointment 11/06/18  Dose verified.    Last UDS  08/01/18    Last fill per 08/07/18  Colleen Ing Report 09/05/18 8/1/2018  4:10 PM - Selene Banks MA     Component Results     Component Collected Lab   Amphetamine Screen, Urine 08/01/2018  4:10 PM Unknown   neg    Comment:   MOP/Norco: Positive   Barbiturate Screen, Urine 08/01/2018  4:10 PM Unknown   neg    Benzodiazepine Screen, Urine 08/01/2018  4:10 PM Unknown   neg    Buprenorphine Urine 08/01/2018  4:10 PM Unknown   neg    Cocaine Metabolite Screen, Urine 08/01/2018  4:10 PM Unknown   neg    Gabapentin Screen, Urine 08/01/2018  4:10 PM Unknown   neg    Methamphetamine, Urine 08/01/2018  4:10 PM Unknown   neg    Methadone Screen, Urine 08/01/2018  4:10 PM Unknown   neg    Opiate Scrn, Ur 08/01/2018  4:10 PM Unknown   neg    Oxycodone Screen, Ur 08/01/2018  4:10 PM Unknown   neg    PCP Screen, Urine 08/01/2018  4:10 PM Unknown   neg    Propoxyphene Screen, Urine 08/01/2018  4:10 PM Unknown   neg    THC Screen, Urine 08/01/2018  4:10 PM Unknown   neg    Tricyclic Antidepressants, Urine 08/01/2018  4:10 PM Unknown   neg    Lab and Collection     POCT Rapid Drug Screen on 8/1/2018

## 2018-10-01 DIAGNOSIS — R19.7 DIARRHEA, UNSPECIFIED TYPE: Primary | ICD-10-CM

## 2018-10-01 RX ORDER — DIPHENOXYLATE HYDROCHLORIDE AND ATROPINE SULFATE 2.5; .025 MG/1; MG/1
TABLET ORAL
Qty: 120 TABLET | Refills: 0 | Status: SHIPPED | OUTPATIENT
Start: 2018-10-01 | End: 2018-10-31

## 2018-10-09 DIAGNOSIS — G89.4 CHRONIC PAIN SYNDROME: ICD-10-CM

## 2018-10-09 NOTE — TELEPHONE ENCOUNTER
[facility] called with request for refill on norco. Last office visit 8-1 with next scheduled appointment 11-2  Dose verified.    Last UDS  8-1    Last fill per Siddharth Griffin Report 9-5  Component Results     Component Collected Lab   Amphetamine Screen, Urine 08/01/2018  4:10 PM Unknown   neg    Comment:   MOP/Norco: Positive   Barbiturate Screen, Urine 08/01/2018  4:10 PM Unknown   neg    Benzodiazepine Screen, Urine 08/01/2018  4:10 PM Unknown   neg    Buprenorphine Urine 08/01/2018  4:10 PM Unknown   neg    Cocaine Metabolite Screen, Urine 08/01/2018  4:10 PM Unknown   neg    Gabapentin Screen, Urine 08/01/2018  4:10 PM Unknown   neg    Methamphetamine, Urine 08/01/2018  4:10 PM Unknown   neg    Methadone Screen, Urine 08/01/2018  4:10 PM Unknown   neg    Opiate Scrn, Ur 08/01/2018  4:10 PM Unknown   neg    Oxycodone Screen, Ur 08/01/2018  4:10 PM Unknown   neg    PCP Screen, Urine 08/01/2018  4:10 PM Unknown   neg    Propoxyphene Screen, Urine 08/01/2018  4:10 PM Unknown   neg    THC Screen, Urine 08/01/2018  4:10 PM Unknown   neg    Tricyclic Antidepressants, Urine 08/01/2018  4:10 PM Unknown   neg    Lab and Collection     POCT Rapid Drug Screen on 8/1/2018

## 2018-10-10 RX ORDER — HYDROCODONE BITARTRATE AND ACETAMINOPHEN 10; 325 MG/1; MG/1
1 TABLET ORAL EVERY 8 HOURS PRN
Qty: 90 TABLET | Refills: 0 | Status: SHIPPED | OUTPATIENT
Start: 2018-10-10 | End: 2018-11-01 | Stop reason: SDUPTHER

## 2018-10-15 ENCOUNTER — OFFICE VISIT (OUTPATIENT)
Dept: URGENT CARE | Age: 53
End: 2018-10-15
Payer: MEDICAID

## 2018-10-15 VITALS
HEIGHT: 65 IN | BODY MASS INDEX: 36.65 KG/M2 | WEIGHT: 220 LBS | TEMPERATURE: 97.9 F | DIASTOLIC BLOOD PRESSURE: 80 MMHG | SYSTOLIC BLOOD PRESSURE: 122 MMHG | RESPIRATION RATE: 18 BRPM | HEART RATE: 103 BPM | OXYGEN SATURATION: 94 %

## 2018-10-15 DIAGNOSIS — J02.9 PHARYNGITIS, UNSPECIFIED ETIOLOGY: ICD-10-CM

## 2018-10-15 DIAGNOSIS — R52 BODY ACHES: ICD-10-CM

## 2018-10-15 DIAGNOSIS — J98.8 RESPIRATORY INFECTION: Primary | ICD-10-CM

## 2018-10-15 DIAGNOSIS — R05.8 COUGH PRODUCTIVE OF PURULENT SPUTUM: ICD-10-CM

## 2018-10-15 LAB — S PYO AG THROAT QL: NORMAL

## 2018-10-15 PROCEDURE — 87880 STREP A ASSAY W/OPTIC: CPT | Performed by: SPECIALIST

## 2018-10-15 PROCEDURE — 99213 OFFICE O/P EST LOW 20 MIN: CPT | Performed by: SPECIALIST

## 2018-10-15 RX ORDER — METHYLPREDNISOLONE SODIUM SUCCINATE 40 MG/ML
40 INJECTION, POWDER, LYOPHILIZED, FOR SOLUTION INTRAMUSCULAR; INTRAVENOUS ONCE
Status: COMPLETED | OUTPATIENT
Start: 2018-10-15 | End: 2018-10-15

## 2018-10-15 RX ADMIN — METHYLPREDNISOLONE SODIUM SUCCINATE 40 MG: 40 INJECTION, POWDER, LYOPHILIZED, FOR SOLUTION INTRAMUSCULAR; INTRAVENOUS at 18:43

## 2018-10-15 ASSESSMENT — ENCOUNTER SYMPTOMS
SORE THROAT: 1
COUGH: 1
SINUS PRESSURE: 1
RHINORRHEA: 1
SHORTNESS OF BREATH: 1

## 2018-10-15 NOTE — PROGRESS NOTES
31 year old female, ,  presents at 34 3/7 weeks for obstetric ultrasound assessment indicated by h/o seizure disorder.    Single fetus    Presentation - cephalic    USEGA = 34 1/7 weeks.  EFW = 2,354 grams, 35 % for 34 weeks.      YARITZA = 9.7cm.  FHR = 135bpm     Placenta anterior and grade 0    Comments: Appropriate growth for gestational age    Findings discussed with patient.    Further studies as clinically indicated.        MIRTA Wallace MD   Cardiovascular: Negative. Allergic/Immunologic: Positive for environmental allergies. Objective:     Physical Exam   Constitutional: She is oriented to person, place, and time. She appears well-developed and well-nourished. She has a sickly appearance. HENT:   Head: Normocephalic and atraumatic. Right Ear: Hearing, tympanic membrane, external ear and ear canal normal.   Left Ear: Hearing, tympanic membrane, external ear and ear canal normal.   Nose: Rhinorrhea present. Right sinus exhibits frontal sinus tenderness. Left sinus exhibits frontal sinus tenderness. Mouth/Throat: Uvula is midline and mucous membranes are normal. Posterior oropharyngeal erythema present. Tonsils are 2+ on the right. Tonsils are 2+ on the left. Neck: Normal range of motion. Cardiovascular: Normal rate, regular rhythm, S1 normal, S2 normal, normal heart sounds and intact distal pulses. Pulmonary/Chest: Effort normal and breath sounds normal.   Lymphadenopathy:     She has cervical adenopathy. Neurological: She is alert and oriented to person, place, and time. Skin: Skin is warm and dry. Psychiatric: She has a normal mood and affect. Her speech is normal and behavior is normal. Judgment and thought content normal.   Nursing note and vitals reviewed. /80   Pulse 103   Temp 97.9 °F (36.6 °C) (Temporal)   Resp 18   Ht 5' 5\" (1.651 m)   Wt 220 lb (99.8 kg)   LMP  (LMP Unknown)   SpO2 94%   BMI 36.61 kg/m²     Assessment:       Diagnosis Orders   1. Respiratory infection     2. Pharyngitis, unspecified etiology  POCT rapid strep A   3. Cough productive of purulent sputum     4. Body aches       Results for orders placed or performed in visit on 10/15/18   POCT rapid strep A   Result Value Ref Range    Strep A Ag None Detected None Detected     POCT Influenza was obtained in office. Results were inconclusive. Our machine read Influenza A/B positive invalid result.   I explained this to head on pillows to help you breathe and ease a dry cough. · Try cough drops to soothe a dry or sore throat. Cough drops don't stop a cough. Medicine-flavored cough drops are no better than candy-flavored drops or hard candy. · Do not smoke. Avoid secondhand smoke. If you need help quitting, talk to your doctor about stop-smoking programs and medicines. These can increase your chances of quitting for good. When should you call for help? Call 911 anytime you think you may need emergency care. For example, call if:    · You have severe trouble breathing.    Call your doctor now or seek immediate medical care if:    · You cough up blood.     · You have new or worse trouble breathing.     · You have a new or higher fever.     · You have a new rash.    Watch closely for changes in your health, and be sure to contact your doctor if:    · You cough more deeply or more often, especially if you notice more mucus or a change in the color of your mucus.     · You have new symptoms, such as a sore throat, an earache, or sinus pain.     · You do not get better as expected. Where can you learn more? Go to https://MoburstpeMoblyng.CitiusTech. org and sign in to your GeniusMatcher account. Enter D279 in the TrueVault box to learn more about \"Cough: Care Instructions. \"     If you do not have an account, please click on the \"Sign Up Now\" link. Current as of: December 6, 2017  Content Version: 11.7  © 2285-5143 [employer]. Care instructions adapted under license by [facility] ([facility]). If you have questions about a medical condition or this instruction, always ask your healthcare professional. [place] [address] any warranty or liability for your use of this information. Patient Education        Sore Throat: Care Instructions  Your Care Instructions    Infection by bacteria or a virus causes most sore throats.  Cigarette smoke, dry air, air pollution, allergies, and yelling can also cause a sore throat. Sore throats can be painful and annoying. Fortunately, most sore throats go away on their own. If you have a bacterial infection, your doctor may prescribe antibiotics. Follow-up care is a key part of your treatment and safety. Be sure to make and go to all appointments, and call your doctor if you are having problems. It's also a good idea to know your test results and keep a list of the medicines you take. How can you care for yourself at home? · If your doctor prescribed antibiotics, take them as directed. Do not stop taking them just because you feel better. You need to take the full course of antibiotics. · Gargle with warm salt water once an hour to help reduce swelling and relieve discomfort. Use 1 teaspoon of salt mixed in 1 cup of warm water. · Take an over-the-counter pain medicine, such as acetaminophen (Tylenol), ibuprofen (Advil, Motrin), or naproxen (Aleve). Read and follow all instructions on the label. · Be careful when taking over-the-counter cold or flu medicines and Tylenol at the same time. Many of these medicines have acetaminophen, which is Tylenol. Read the labels to make sure that you are not taking more than the recommended dose. Too much acetaminophen (Tylenol) can be harmful. · Drink plenty of fluids. Fluids may help soothe an irritated throat. Hot fluids, such as tea or soup, may help decrease throat pain. · Use over-the-counter throat lozenges to soothe pain. Regular cough drops or hard candy may also help. These should not be given to young children because of the risk of choking. · Do not smoke or allow others to smoke around you. If you need help quitting, talk to your doctor about stop-smoking programs and medicines. These can increase your chances of quitting for good. · Use a vaporizer or humidifier to add moisture to your bedroom. Follow the directions for cleaning the machine. When should you call for help?   Call your doctor now or seek immediate medical

## 2018-10-15 NOTE — PROGRESS NOTES
Solumedrol 40 mg given intramuscularly to right  gluteal. Pt tolerated well. BICILLIN L-A 1,200,000 units per 2 mL GIVEN INTRAMUSCULARLY IN LEFT GLUTEAL. PT TOLERATED WELL.

## 2018-10-15 NOTE — PATIENT INSTRUCTIONS
Read and follow all instructions on the label. · Be careful when taking over-the-counter cold or flu medicines and Tylenol at the same time. Many of these medicines have acetaminophen, which is Tylenol. Read the labels to make sure that you are not taking more than the recommended dose. Too much acetaminophen (Tylenol) can be harmful. · Drink plenty of fluids. Fluids may help soothe an irritated throat. Hot fluids, such as tea or soup, may help decrease throat pain. · Use over-the-counter throat lozenges to soothe pain. Regular cough drops or hard candy may also help. These should not be given to young children because of the risk of choking. · Do not smoke or allow others to smoke around you. If you need help quitting, talk to your doctor about stop-smoking programs and medicines. These can increase your chances of quitting for good. · Use a vaporizer or humidifier to add moisture to your bedroom. Follow the directions for cleaning the machine. When should you call for help? Call your doctor now or seek immediate medical care if:    · You have new or worse trouble swallowing.     · Your sore throat gets much worse on one side.    Watch closely for changes in your health, and be sure to contact your doctor if you do not get better as expected. Where can you learn more? Go to https://Jia.com.Written. org and sign in to your Roambi account. Enter J086 in the [facility] box to learn more about \"Sore Throat: Care Instructions. \"     If you do not have an account, please click on the \"Sign Up Now\" link. Current as of: May 12, 2017  Content Version: 11.7  © 5844-5829 [employer]. Care instructions adapted under license by [facility] ([facility]). If you have questions about a medical condition or this instruction, always ask your healthcare professional. [address] any warranty or liability for your use of this information.

## 2018-10-17 ENCOUNTER — TELEPHONE (OUTPATIENT)
Dept: URGENT CARE | Age: 53
End: 2018-10-17

## 2018-11-01 DIAGNOSIS — G89.4 CHRONIC PAIN SYNDROME: ICD-10-CM

## 2018-11-02 ENCOUNTER — OFFICE VISIT (OUTPATIENT)
Dept: PRIMARY CARE CLINIC | Age: 53
End: 2018-11-02
Payer: MEDICAID

## 2018-11-02 VITALS
DIASTOLIC BLOOD PRESSURE: 76 MMHG | WEIGHT: 220 LBS | OXYGEN SATURATION: 97 % | SYSTOLIC BLOOD PRESSURE: 132 MMHG | TEMPERATURE: 97.4 F | HEIGHT: 65 IN | BODY MASS INDEX: 36.65 KG/M2 | HEART RATE: 94 BPM

## 2018-11-02 DIAGNOSIS — G89.4 CHRONIC PAIN SYNDROME: ICD-10-CM

## 2018-11-02 DIAGNOSIS — J06.9 UPPER RESPIRATORY TRACT INFECTION, UNSPECIFIED TYPE: ICD-10-CM

## 2018-11-02 DIAGNOSIS — F41.8 ANXIETY WITH DEPRESSION: Primary | ICD-10-CM

## 2018-11-02 PROCEDURE — 99213 OFFICE O/P EST LOW 20 MIN: CPT | Performed by: NURSE PRACTITIONER

## 2018-11-02 RX ORDER — HYDROCODONE BITARTRATE AND ACETAMINOPHEN 10; 325 MG/1; MG/1
1 TABLET ORAL EVERY 8 HOURS PRN
Qty: 90 TABLET | Refills: 0 | Status: SHIPPED | OUTPATIENT
Start: 2018-11-10 | End: 2018-11-02 | Stop reason: SDUPTHER

## 2018-11-02 RX ORDER — HYDROCODONE BITARTRATE AND ACETAMINOPHEN 10; 325 MG/1; MG/1
1 TABLET ORAL EVERY 8 HOURS PRN
Qty: 90 TABLET | Refills: 0 | Status: SHIPPED | OUTPATIENT
Start: 2018-11-10 | End: 2018-11-05

## 2018-11-02 NOTE — PROGRESS NOTES
2018     Tonya Evans (:  1965) is a 47 y.o. female, here for evaluation of the following medical concerns:  Chief Complaint   Patient presents with    URI     Was seen in Urgent care yesterday    Anxiety     Doing Okay per patient but has stopped taking Zoloft due to nausea and vomiting    Depression     HPI  This patient is normally followed by Dr. Sofi Miles. She is a 71-year-old  female . Shee reports that he has been doing well until she recently had a upper respiratory infection was seen in urgent care, reports overall she is doing well. She still has an ongoing cough and congestion. He has a long history of muscular dystrophy and chronic pain disorder she is on Norco 10/325 3 times a day which is prescribed by Dr. Georgina Conklin. He reports overall this is managing her pain. Patient also has long history of anxiety with depression and was on Zoloft 150 mg complained that off over the past 3 months. Currently denies suicidal or homicidal ideation reports she is doing well. Review of Systems   Constitutional: Negative for activity change, appetite change and fatigue. HENT: Positive for congestion and rhinorrhea. Respiratory: Positive for cough. Negative for apnea, shortness of breath and wheezing. Cardiovascular: Negative for chest pain, palpitations and leg swelling. Genitourinary:        Chronic urinary incontinence   Musculoskeletal: Positive for arthralgias, gait problem and myalgias. Patient is in a wheelchair and has to have assistance with transfers and most all activities of daily living. Neurological: Positive for weakness. Psychiatric/Behavioral: Negative for self-injury, sleep disturbance and suicidal ideas. The patient is nervous/anxious. Patient reports symptoms are stable, since she discontinued medication does not wish to restart it at this time. Prior to Visit Medications    Medication Sig Taking?  Authorizing Provider   guaiFENesin (MUCINEX) 600 MG extended release tablet Take 1,200 mg by mouth 2 times daily Yes Historical Provider, MD   Diapers & Supplies MISC pullups wipes and bed pads. One month supply-patient needs appointment Yes Historical Provider, MD   simvastatin (ZOCOR) 80 MG tablet Take 1 tablet by mouth nightly  Leslie Suh MD        Social History   Substance Use Topics    Smoking status: Current Some Day Smoker     Packs/day: 0.50     Years: 20.00     Types: Cigarettes     Start date: 37    Smokeless tobacco: Former User      Comment: used an electronic cig/smokes marijuana    Alcohol use No        Vitals:    11/02/18 1324   BP: 132/76   Pulse: 94   Temp: 97.4 °F (36.3 °C)   SpO2: 97%   Weight: 220 lb (99.8 kg)   Height: 5' 5\" (1.651 m)     Estimated body mass index is 36.61 kg/m² as calculated from the following:    Height as of this encounter: 5' 5\" (1.651 m). Weight as of this encounter: 220 lb (99.8 kg). Physical Exam   Constitutional: She is oriented to person, place, and time. She appears well-developed and well-nourished. Eyes: Pupils are equal, round, and reactive to light. Neck: Normal range of motion. Neck supple. Cardiovascular: Normal rate, regular rhythm, normal heart sounds and intact distal pulses. Musculoskeletal:   Chronic widespread pain   Neurological: She is alert and oriented to person, place, and time. Coordination abnormal.   Patient can ambulate with a walker but primarily uses motorized wheelchair   Skin: Skin is warm and dry. Psychiatric: She has a normal mood and affect. Her behavior is normal. Judgment and thought content normal.   Nursing note and vitals reviewed. ASSESSMENT/PLan  Maddi Ambriz was seen today for uri, anxiety and depression. Diagnoses and all orders for this visit:    Anxiety with depression    Chronic pain syndrome  -     Discontinue: HYDROcodone-acetaminophen (NORCO)  MG per tablet;  Take 1 tablet by mouth every 8 hours as needed for Pain for up

## 2018-11-05 ASSESSMENT — ENCOUNTER SYMPTOMS
SHORTNESS OF BREATH: 0
RHINORRHEA: 1
COUGH: 1
APNEA: 0
WHEEZING: 0

## 2018-12-17 ENCOUNTER — OFFICE VISIT (OUTPATIENT)
Dept: OTOLARYNGOLOGY | Age: 53
End: 2018-12-17
Payer: MEDICAID

## 2018-12-17 VITALS
HEIGHT: 65 IN | SYSTOLIC BLOOD PRESSURE: 118 MMHG | RESPIRATION RATE: 18 BRPM | BODY MASS INDEX: 36.61 KG/M2 | DIASTOLIC BLOOD PRESSURE: 72 MMHG | HEART RATE: 95 BPM | OXYGEN SATURATION: 98 %

## 2018-12-17 DIAGNOSIS — R42 DIZZINESS: ICD-10-CM

## 2018-12-17 DIAGNOSIS — H69.82 ETD (EUSTACHIAN TUBE DYSFUNCTION), LEFT: Primary | ICD-10-CM

## 2018-12-17 PROCEDURE — 99214 OFFICE O/P EST MOD 30 MIN: CPT | Performed by: OTOLARYNGOLOGY

## 2018-12-17 RX ORDER — DIPHENOXYLATE HYDROCHLORIDE AND ATROPINE SULFATE 2.5; .025 MG/1; MG/1
1 TABLET ORAL PRN
COMMUNITY
Start: 2017-04-27 | End: 2019-11-22 | Stop reason: SDUPTHER

## 2018-12-17 RX ORDER — HYDROCODONE BITARTRATE AND ACETAMINOPHEN 10; 325 MG/1; MG/1
1 TABLET ORAL 3 TIMES DAILY PRN
COMMUNITY
End: 2019-01-16

## 2019-01-02 ENCOUNTER — PROCEDURE VISIT (OUTPATIENT)
Dept: OTOLARYNGOLOGY | Age: 54
End: 2019-01-02
Payer: MEDICAID

## 2019-01-02 ENCOUNTER — OFFICE VISIT (OUTPATIENT)
Dept: OTOLARYNGOLOGY | Age: 54
End: 2019-01-02
Payer: MEDICAID

## 2019-01-02 VITALS
SYSTOLIC BLOOD PRESSURE: 134 MMHG | BODY MASS INDEX: 36.61 KG/M2 | HEART RATE: 90 BPM | TEMPERATURE: 98.4 F | RESPIRATION RATE: 18 BRPM | OXYGEN SATURATION: 99 % | HEIGHT: 65 IN | DIASTOLIC BLOOD PRESSURE: 76 MMHG

## 2019-01-02 DIAGNOSIS — H66.002 ACUTE SUPPURATIVE OTITIS MEDIA OF LEFT EAR WITHOUT SPONTANEOUS RUPTURE OF TYMPANIC MEMBRANE, RECURRENCE NOT SPECIFIED: Primary | ICD-10-CM

## 2019-01-02 DIAGNOSIS — H65.93 OTITIS MEDIA WITH EFFUSION, BILATERAL: ICD-10-CM

## 2019-01-02 DIAGNOSIS — H69.82 ETD (EUSTACHIAN TUBE DYSFUNCTION), LEFT: Primary | ICD-10-CM

## 2019-01-02 PROCEDURE — 69420 INCISION OF EARDRUM: CPT | Performed by: OTOLARYNGOLOGY

## 2019-01-02 PROCEDURE — 92567 TYMPANOMETRY: CPT | Performed by: AUDIOLOGIST

## 2019-01-02 PROCEDURE — 92553 AUDIOMETRY AIR & BONE: CPT | Performed by: AUDIOLOGIST

## 2019-01-11 DIAGNOSIS — G89.4 CHRONIC PAIN SYNDROME: ICD-10-CM

## 2019-01-14 RX ORDER — HYDROCODONE BITARTRATE AND ACETAMINOPHEN 10; 325 MG/1; MG/1
1 TABLET ORAL EVERY 8 HOURS PRN
Qty: 90 TABLET | Refills: 0 | Status: SHIPPED | OUTPATIENT
Start: 2019-01-14 | End: 2019-02-13

## 2019-01-16 ENCOUNTER — HOSPITAL ENCOUNTER (OUTPATIENT)
Dept: GENERAL RADIOLOGY | Age: 54
Discharge: HOME | End: 2019-01-16
Payer: MEDICAID

## 2019-01-16 ENCOUNTER — OFFICE VISIT (OUTPATIENT)
Dept: URGENT CARE | Age: 54
End: 2019-01-16
Payer: MEDICAID

## 2019-01-16 VITALS
OXYGEN SATURATION: 97 % | HEART RATE: 99 BPM | SYSTOLIC BLOOD PRESSURE: 109 MMHG | HEIGHT: 65 IN | BODY MASS INDEX: 36.65 KG/M2 | RESPIRATION RATE: 18 BRPM | TEMPERATURE: 97 F | WEIGHT: 220 LBS | DIASTOLIC BLOOD PRESSURE: 74 MMHG

## 2019-01-16 DIAGNOSIS — M25.512 ACUTE PAIN OF LEFT SHOULDER: ICD-10-CM

## 2019-01-16 DIAGNOSIS — M19.012 ARTHRITIS OF LEFT SHOULDER REGION: Primary | ICD-10-CM

## 2019-01-16 PROCEDURE — 99213 OFFICE O/P EST LOW 20 MIN: CPT | Performed by: SPECIALIST

## 2019-01-16 PROCEDURE — 73030 X-RAY EXAM OF SHOULDER: CPT

## 2019-01-16 RX ORDER — METHYLPREDNISOLONE 4 MG/1
TABLET ORAL
Qty: 1 KIT | Refills: 0 | Status: SHIPPED | OUTPATIENT
Start: 2019-01-16 | End: 2019-02-05 | Stop reason: ALTCHOICE

## 2019-02-05 ENCOUNTER — OFFICE VISIT (OUTPATIENT)
Dept: PRIMARY CARE CLINIC | Age: 54
End: 2019-02-05
Payer: MEDICAID

## 2019-02-05 VITALS
TEMPERATURE: 97 F | HEART RATE: 92 BPM | HEIGHT: 65 IN | OXYGEN SATURATION: 94 % | DIASTOLIC BLOOD PRESSURE: 70 MMHG | BODY MASS INDEX: 36.65 KG/M2 | WEIGHT: 220 LBS | SYSTOLIC BLOOD PRESSURE: 122 MMHG

## 2019-02-05 DIAGNOSIS — G89.4 CHRONIC PAIN SYNDROME: Primary | ICD-10-CM

## 2019-02-05 DIAGNOSIS — F41.8 ANXIETY WITH DEPRESSION: ICD-10-CM

## 2019-02-05 DIAGNOSIS — G71.00 MUSCULAR DYSTROPHY (HCC): ICD-10-CM

## 2019-02-05 DIAGNOSIS — M25.512 ACUTE PAIN OF LEFT SHOULDER: ICD-10-CM

## 2019-02-05 LAB
AMPHETAMINE SCREEN, URINE: NORMAL
BARBITURATE SCREEN, URINE: NORMAL
BENZODIAZEPINE SCREEN, URINE: NORMAL
BUPRENORPHINE URINE: NORMAL
COCAINE METABOLITE SCREEN URINE: NORMAL
GABAPENTIN SCREEN, URINE: NORMAL
MDMA URINE: NORMAL
METHADONE SCREEN, URINE: NORMAL
METHAMPHETAMINE, URINE: NORMAL
OPIATE SCREEN URINE: NORMAL
OXYCODONE SCREEN URINE: NORMAL
PHENCYCLIDINE SCREEN URINE: NORMAL
PROPOXYPHENE SCREEN, URINE: NORMAL
THC SCREEN, URINE: NORMAL
TRICYCLIC ANTIDEPRESSANTS, UR: NORMAL

## 2019-02-05 PROCEDURE — 80305 DRUG TEST PRSMV DIR OPT OBS: CPT | Performed by: FAMILY MEDICINE

## 2019-02-05 PROCEDURE — 99214 OFFICE O/P EST MOD 30 MIN: CPT | Performed by: FAMILY MEDICINE

## 2019-02-05 ASSESSMENT — PATIENT HEALTH QUESTIONNAIRE - PHQ9
SUM OF ALL RESPONSES TO PHQ9 QUESTIONS 1 & 2: 0
SUM OF ALL RESPONSES TO PHQ QUESTIONS 1-9: 0
2. FEELING DOWN, DEPRESSED OR HOPELESS: 0
SUM OF ALL RESPONSES TO PHQ QUESTIONS 1-9: 0
1. LITTLE INTEREST OR PLEASURE IN DOING THINGS: 0

## 2019-02-05 ASSESSMENT — ENCOUNTER SYMPTOMS
COUGH: 0
SHORTNESS OF BREATH: 0
COLOR CHANGE: 0

## 2019-02-11 ENCOUNTER — TELEPHONE (OUTPATIENT)
Dept: PRIMARY CARE CLINIC | Age: 54
End: 2019-02-11

## 2019-02-13 ENCOUNTER — TELEPHONE (OUTPATIENT)
Dept: PRIMARY CARE CLINIC | Age: 54
End: 2019-02-13

## 2019-02-14 ENCOUNTER — OFFICE VISIT (OUTPATIENT)
Dept: PRIMARY CARE CLINIC | Age: 54
End: 2019-02-14
Payer: MEDICAID

## 2019-02-14 DIAGNOSIS — Z79.899 ENCOUNTER FOR DRUG THERAPY: Primary | ICD-10-CM

## 2019-02-14 DIAGNOSIS — G89.4 CHRONIC PAIN SYNDROME: Primary | ICD-10-CM

## 2019-02-14 PROCEDURE — 80305 DRUG TEST PRSMV DIR OPT OBS: CPT | Performed by: FAMILY MEDICINE

## 2019-02-18 ENCOUNTER — TELEPHONE (OUTPATIENT)
Dept: PRIMARY CARE CLINIC | Age: 54
End: 2019-02-18

## 2019-02-18 RX ORDER — HYDROCODONE BITARTRATE AND ACETAMINOPHEN 10; 325 MG/1; MG/1
1 TABLET ORAL EVERY 8 HOURS PRN
Qty: 90 TABLET | Refills: 0 | Status: SHIPPED | OUTPATIENT
Start: 2019-02-18 | End: 2019-03-20

## 2019-02-27 ENCOUNTER — TELEPHONE (OUTPATIENT)
Dept: PRIMARY CARE CLINIC | Age: 54
End: 2019-02-27

## 2019-03-12 ENCOUNTER — TELEPHONE (OUTPATIENT)
Dept: PRIMARY CARE CLINIC | Age: 54
End: 2019-03-12

## 2019-03-18 DIAGNOSIS — G89.4 CHRONIC PAIN SYNDROME: ICD-10-CM

## 2019-03-18 RX ORDER — HYDROCODONE BITARTRATE AND ACETAMINOPHEN 10; 325 MG/1; MG/1
1 TABLET ORAL EVERY 8 HOURS PRN
Qty: 90 TABLET | Refills: 0 | Status: CANCELLED | OUTPATIENT
Start: 2019-03-20 | End: 2019-04-19

## 2019-04-18 DIAGNOSIS — G89.29 OTHER CHRONIC PAIN: Primary | ICD-10-CM

## 2019-04-18 NOTE — TELEPHONE ENCOUNTER
Marilou Sotelo called requesting a refill of the below medication which has been pended for you:     Requested Prescriptions      No prescriptions requested or ordered in this encounter       Last Appointment Date: 2/14/2019  Next Appointment Date: 5/8/2019    Allergies   Allergen Reactions    Erythromycin      Eye antibiotic ointment-reaction to the eyes swollen-daughter mentioned and is swollen noted.     Neomycin-Bacitracin Zn-Polymyx Hives     AND ITCHING    Neosporin [Bacitracin-Neomycin-Polymyxin]     Oxycodone-Acetaminophen Itching    Percocet [Oxycodone-Acetaminophen] Itching    Percodan [Oxycodone-Aspirin] Itching    Tramadol Itching    Food Diarrhea and Nausea And Vomiting     Allergic to all types of peppers         Component   Amphetamine Screen, Urine   Neg    Comment:   MOP - Norco Pos   Barbiturate Screen, Urine   Neg    Benzodiazepine Screen, Urine   Neg    Buprenorphine Urine   Neg    Cocaine Metabolite Screen, Urine   Neg    Gabapentin Screen, Urine   Neg    MDMA, Urine   Neg    Methamphetamine, Urine   Neg    Methadone Screen, Urine   Neg    Opiate Scrn, Ur   Neg    Oxycodone Screen, Ur   Neg    PCP Screen, Urine   Neg    Propoxyphene Screen, Urine   Neg    THC Screen, Urine   Neg    Tricyclic Antidepressants, Urine   Neg    Lab and Collection     POCT Rapid Drug Screen - 2/14/2019   All Reviewers List

## 2019-04-19 RX ORDER — HYDROCODONE BITARTRATE AND ACETAMINOPHEN 10; 325 MG/1; MG/1
1 TABLET ORAL EVERY 8 HOURS PRN
Qty: 90 TABLET | Refills: 0 | Status: SHIPPED | OUTPATIENT
Start: 2019-04-19 | End: 2019-05-24 | Stop reason: SDUPTHER

## 2019-05-08 ENCOUNTER — OFFICE VISIT (OUTPATIENT)
Dept: PRIMARY CARE CLINIC | Age: 54
End: 2019-05-08
Payer: MEDICAID

## 2019-05-08 VITALS
BODY MASS INDEX: 39.15 KG/M2 | WEIGHT: 235 LBS | TEMPERATURE: 98 F | SYSTOLIC BLOOD PRESSURE: 130 MMHG | HEIGHT: 65 IN | OXYGEN SATURATION: 94 % | HEART RATE: 100 BPM | DIASTOLIC BLOOD PRESSURE: 80 MMHG

## 2019-05-08 DIAGNOSIS — G71.00 MUSCULAR DYSTROPHY (HCC): ICD-10-CM

## 2019-05-08 DIAGNOSIS — G89.4 CHRONIC PAIN SYNDROME: Primary | ICD-10-CM

## 2019-05-08 DIAGNOSIS — Z12.31 ENCOUNTER FOR SCREENING MAMMOGRAM FOR BREAST CANCER: ICD-10-CM

## 2019-05-08 PROCEDURE — 99213 OFFICE O/P EST LOW 20 MIN: CPT | Performed by: FAMILY MEDICINE

## 2019-05-08 RX ORDER — GLUCOSAMINE HCL 500 MG
TABLET ORAL
COMMUNITY
End: 2021-07-13

## 2019-05-08 ASSESSMENT — ENCOUNTER SYMPTOMS
BACK PAIN: 1
COLOR CHANGE: 0
COUGH: 0
SHORTNESS OF BREATH: 0

## 2019-05-08 NOTE — PROGRESS NOTES
Espinoza Maurer is a 49 y.o. female    Chief Complaint   Patient presents with    3 Month Follow-Up     chronic pain       HPI  Espinoza Maurer has a long sitting history of muscular dystrophy. This is been progressively worsening. Patient also has long-standing history of chronic pain syndrome anxiety. Patient is currently on Herbert Roes 10/325 one by mouth 3 times a day. Patient states her pain is stable at this time. Patient is having problems with affording her pain medication. It is now requiring a preauthorization. It has been [facility] to the [facility] on 4/30/19. She has tried multiple medications including NSAIDs in the past without relief. Review of Systems   Constitutional: Negative for chills and fever. Respiratory: Negative for cough and shortness of breath. Cardiovascular: Negative for chest pain and leg swelling. Musculoskeletal: Positive for arthralgias, back pain and gait problem. Skin: Negative for color change and rash. Prior to Admission medications    Medication Sig Start Date End Date Taking? Authorizing Provider   Cholecalciferol (VITAMIN D3) 3000 units TABS Take by mouth   Yes Historical Provider, MD   HYDROcodone-acetaminophen (NORCO)  MG per tablet Take 1 tablet by mouth every 8 hours as needed for Pain for up to 30 days. Intended supply: 30 days 4/19/19 5/19/19 Yes Sabrina Hayden MD   diphenoxylate-atropine (LOMOTIL) 2.5-0.025 MG per tablet Take 1 tablet by mouth as needed. . 4/27/17  Yes Historical Provider, MD   guaiFENesin (MUCINEX) 600 MG extended release tablet Take 1,200 mg by mouth 2 times daily   Yes Historical Provider, MD   Diapers & Supplies MISC pullups wipes and bed pads.  One month supply-patient needs appointment 3/3/17  Yes Historical Provider, MD       Past Medical History:   Diagnosis Date    Anxiety     Depression     Hyperlipidemia     Muscular dystrophy     Urinary incontinence        Past Surgical History:   Procedure Laterality Date    APPENDECTOMY sounds normal. No respiratory distress. She has no wheezes. Musculoskeletal:        Left shoulder: She exhibits decreased range of motion, tenderness and bony tenderness. Chronic widespread pain   Neurological: She is alert and oriented to person, place, and time. Coordination abnormal.   Patient can ambulate with a walker but primarily uses motorized wheelchair   Skin: Skin is warm and dry. Psychiatric: She has a normal mood and affect. Her behavior is normal. Judgment and thought content normal.   Nursing note and vitals reviewed. Assessment    ICD-10-CM    1. Chronic pain syndrome G89.4 Stable. Continue Norco. No signs of misuse or abuse. 2. Muscular dystrophy G71.00    3. Encounter for screening mammogram for breast cancer Z12.31 [facility] Digital Screen Bilateral [[id number]]           Plan    No orders of the defined types were placed in this encounter. Orders Placed This Encounter   Procedures    [facility] Digital Screen Bilateral [[id number]]        Return if symptoms worsen or fail to improve. There are no Patient Instructions on file for this visit.

## 2019-05-20 ENCOUNTER — TELEPHONE (OUTPATIENT)
Dept: PRIMARY CARE CLINIC | Age: 54
End: 2019-05-20

## 2019-05-20 ENCOUNTER — HOSPITAL ENCOUNTER (OUTPATIENT)
Dept: WOMENS IMAGING | Age: 54
Discharge: HOME | End: 2019-05-20
Payer: MEDICAID

## 2019-05-20 DIAGNOSIS — Z12.31 ENCOUNTER FOR SCREENING MAMMOGRAM FOR BREAST CANCER: ICD-10-CM

## 2019-05-20 PROCEDURE — 77063 BREAST TOMOSYNTHESIS BI: CPT

## 2019-05-20 NOTE — TELEPHONE ENCOUNTER
Pt called in for refill on Eulogio [address]son[address] 100 pt that she will need to come in for a UDS before we can refill her medication.  Medication contract will need to be filled out as well

## 2019-05-22 ENCOUNTER — TELEPHONE (OUTPATIENT)
Dept: PRIMARY CARE CLINIC | Age: 54
End: 2019-05-22

## 2019-05-22 NOTE — TELEPHONE ENCOUNTER
----- Message from Monica Lazar MD sent at 5/20/2019  4:22 PM CDT -----  Please notify patient of normal results. Mammogram normal. Repeat in one year. Please call if any questions.

## 2019-05-22 NOTE — TELEPHONE ENCOUNTER
This [facility] called and gave the patients their mammogram results. Patient stated understanding.

## 2019-05-24 ENCOUNTER — OFFICE VISIT (OUTPATIENT)
Dept: PRIMARY CARE CLINIC | Age: 54
End: 2019-05-24
Payer: MEDICAID

## 2019-05-24 DIAGNOSIS — G89.29 OTHER CHRONIC PAIN: ICD-10-CM

## 2019-05-24 DIAGNOSIS — Z79.899 MEDICATION MANAGEMENT: Primary | ICD-10-CM

## 2019-05-24 LAB
AMPHETAMINE SCREEN, URINE: ABNORMAL
BARBITURATE SCREEN, URINE: ABNORMAL
BENZODIAZEPINE SCREEN, URINE: ABNORMAL
BUPRENORPHINE URINE: ABNORMAL
COCAINE METABOLITE SCREEN URINE: ABNORMAL
GABAPENTIN SCREEN, URINE: ABNORMAL
MDMA URINE: ABNORMAL
METHADONE SCREEN, URINE: ABNORMAL
METHAMPHETAMINE, URINE: ABNORMAL
OPIATE SCREEN URINE: ABNORMAL
OXYCODONE SCREEN URINE: ABNORMAL
PHENCYCLIDINE SCREEN URINE: ABNORMAL
PROPOXYPHENE SCREEN, URINE: ABNORMAL
THC SCREEN, URINE: ABNORMAL
TRICYCLIC ANTIDEPRESSANTS, UR: ABNORMAL

## 2019-05-24 PROCEDURE — 80305 DRUG TEST PRSMV DIR OPT OBS: CPT | Performed by: NURSE PRACTITIONER

## 2019-05-24 RX ORDER — HYDROCODONE BITARTRATE AND ACETAMINOPHEN 10; 325 MG/1; MG/1
1 TABLET ORAL EVERY 8 HOURS PRN
Qty: 90 TABLET | Refills: 0 | Status: SHIPPED | OUTPATIENT
Start: 2019-05-24 | End: 2019-07-03 | Stop reason: SDUPTHER

## 2019-05-24 NOTE — PROGRESS NOTES
Results have been submitted and reviewed by SHANKAR Batista. She has also submitted pt. Refill and pt. Was notified before leaving the office.

## 2019-05-29 ENCOUNTER — TELEPHONE (OUTPATIENT)
Dept: PRIMARY CARE CLINIC | Age: 54
End: 2019-05-29

## 2019-05-29 NOTE — TELEPHONE ENCOUNTER
Marla's caregiver Morena Damian [phone] called stating that Marla's Hydrocodone(Norco) medication requires prior authorization before the pharmacy will fill it. Please call her to advise.     Last Appt:  5/24/2019  Next Appt:   8/14/2019  Preferred Pharmacy: [facility] [place] on file

## 2019-07-01 DIAGNOSIS — G89.29 OTHER CHRONIC PAIN: ICD-10-CM

## 2019-07-03 RX ORDER — HYDROCODONE BITARTRATE AND ACETAMINOPHEN 10; 325 MG/1; MG/1
1 TABLET ORAL EVERY 8 HOURS PRN
Qty: 90 TABLET | Refills: 0 | Status: SHIPPED | OUTPATIENT
Start: 2019-07-03 | End: 2019-07-31 | Stop reason: SDUPTHER

## 2019-07-24 ENCOUNTER — HOSPITAL ENCOUNTER (EMERGENCY)
Facility: HOSPITAL | Age: 54
Discharge: HOME | End: 2019-07-24
Attending: EMERGENCY MEDICINE | Admitting: EMERGENCY MEDICINE

## 2019-07-24 VITALS
WEIGHT: 220 LBS | HEIGHT: 65 IN | DIASTOLIC BLOOD PRESSURE: 95 MMHG | RESPIRATION RATE: 17 BRPM | TEMPERATURE: 97.8 F | BODY MASS INDEX: 36.65 KG/M2 | SYSTOLIC BLOOD PRESSURE: 133 MMHG | HEART RATE: 93 BPM | OXYGEN SATURATION: 96 %

## 2019-07-24 DIAGNOSIS — B37.9 YEAST INFECTION: Primary | ICD-10-CM

## 2019-07-24 PROCEDURE — 99282 EMERGENCY DEPT VISIT SF MDM: CPT

## 2019-07-24 RX ORDER — NYSTATIN 100000 [USP'U]/G
POWDER TOPICAL 3 TIMES DAILY
Qty: 30 G | Refills: 0 | Status: SHIPPED | OUTPATIENT
Start: 2019-07-24

## 2019-07-30 ENCOUNTER — TELEPHONE (OUTPATIENT)
Dept: PRIMARY CARE CLINIC | Age: 54
End: 2019-07-30

## 2019-07-31 DIAGNOSIS — G89.29 OTHER CHRONIC PAIN: ICD-10-CM

## 2019-07-31 RX ORDER — HYDROCODONE BITARTRATE AND ACETAMINOPHEN 10; 325 MG/1; MG/1
1 TABLET ORAL EVERY 8 HOURS PRN
Qty: 90 TABLET | Refills: 0 | Status: SHIPPED | OUTPATIENT
Start: 2019-07-31 | End: 2019-08-21 | Stop reason: SDUPTHER

## 2019-07-31 NOTE — TELEPHONE ENCOUNTER
Solo Strickland called to request a refill on her medication. Last office visit : 5/24/2019   Next office visit : 8/14/2019     Last UDS:   Amphetamine Screen, Urine   Date Value Ref Range Status   05/24/2019 neg  Final     Barbiturate Screen, Urine   Date Value Ref Range Status   05/24/2019 neg  Final     Benzodiazepine Screen, Urine   Date Value Ref Range Status   05/24/2019 neg  Final     Buprenorphine Urine   Date Value Ref Range Status   05/24/2019 neg  Final     Cocaine Metabolite Screen, Urine   Date Value Ref Range Status   05/24/2019 neg  Final     Gabapentin Screen, Urine   Date Value Ref Range Status   05/24/2019 neg  Final     MDMA, Urine   Date Value Ref Range Status   05/24/2019 neg  Final     Methamphetamine, Urine   Date Value Ref Range Status   05/24/2019 neg  Final     Opiate Scrn, Ur   Date Value Ref Range Status   05/24/2019 pos  Final     Comment:     Pt. takes norco      Oxycodone Screen, Ur   Date Value Ref Range Status   05/24/2019 neg  Final     PCP Screen, Urine   Date Value Ref Range Status   05/24/2019 neg  Final     Propoxyphene Screen, Urine   Date Value Ref Range Status   05/24/2019 neg  Final     THC Screen, Urine   Date Value Ref Range Status   05/24/2019 neg  Final     Tricyclic Antidepressants, Urine   Date Value Ref Range Status   05/24/2019 neg  Final       Last Guillermo Gathers:           Medication Contract: Need update   Last Fill: 07/03/19    Requested Prescriptions      No prescriptions requested or ordered in this encounter         Please approve or refuse this medication.    Tawnya Oppenheim, MA

## 2019-08-21 ENCOUNTER — OFFICE VISIT (OUTPATIENT)
Dept: PRIMARY CARE CLINIC | Age: 54
End: 2019-08-21
Payer: MEDICAID

## 2019-08-21 VITALS
TEMPERATURE: 97.8 F | DIASTOLIC BLOOD PRESSURE: 80 MMHG | BODY MASS INDEX: 39.11 KG/M2 | SYSTOLIC BLOOD PRESSURE: 130 MMHG | OXYGEN SATURATION: 98 % | HEART RATE: 116 BPM | HEIGHT: 65 IN

## 2019-08-21 DIAGNOSIS — G89.29 OTHER CHRONIC PAIN: ICD-10-CM

## 2019-08-21 DIAGNOSIS — Z79.899 MEDICATION MANAGEMENT: Primary | ICD-10-CM

## 2019-08-21 DIAGNOSIS — G71.00 MUSCULAR DYSTROPHY (HCC): ICD-10-CM

## 2019-08-21 DIAGNOSIS — N39.42 URINARY INCONTINENCE WITHOUT SENSORY AWARENESS: ICD-10-CM

## 2019-08-21 DIAGNOSIS — M79.671 CHRONIC PAIN IN RIGHT FOOT: ICD-10-CM

## 2019-08-21 DIAGNOSIS — G89.29 CHRONIC PAIN IN RIGHT FOOT: ICD-10-CM

## 2019-08-21 PROCEDURE — 99214 OFFICE O/P EST MOD 30 MIN: CPT | Performed by: NURSE PRACTITIONER

## 2019-08-21 PROCEDURE — 80305 DRUG TEST PRSMV DIR OPT OBS: CPT | Performed by: NURSE PRACTITIONER

## 2019-08-21 RX ORDER — HYDROCODONE BITARTRATE AND ACETAMINOPHEN 10; 325 MG/1; MG/1
1 TABLET ORAL EVERY 8 HOURS PRN
Qty: 90 TABLET | Refills: 0 | Status: SHIPPED | OUTPATIENT
Start: 2019-09-01 | End: 2019-09-30 | Stop reason: SDUPTHER

## 2019-08-21 NOTE — PROGRESS NOTES
Mother     Heart Disease Father     Diabetes Father     Cancer Maternal Grandmother     Heart Disease Paternal Grandfather     Heart Disease Sister        Social History     Tobacco Use    Smoking status: Current Some Day Smoker     Packs/day: 0.50     Years: 20.00     Pack years: 10.00     Types: Cigarettes     Start date: 1980    Smokeless tobacco: Former User    Tobacco comment: used an electronic cig/smokes marijuana   Substance Use Topics    Alcohol use: No      Current Outpatient Medications   Medication Sig Dispense Refill    HYDROcodone-acetaminophen (NORCO)  MG per tablet Take 1 tablet by mouth every 8 hours as needed for Pain for up to 30 days. Intended supply: 30 days 90 tablet 0    Cholecalciferol (VITAMIN D3) 3000 units TABS Take by mouth      diphenoxylate-atropine (LOMOTIL) 2.5-0.025 MG per tablet Take 1 tablet by mouth as needed. Yolette King guaiFENesin (MUCINEX) 600 MG extended release tablet Take 1,200 mg by mouth 2 times daily      Diapers & Supplies MISC pullups wipes and bed pads. One month supply-patient needs appointment       No current facility-administered medications for this visit. Allergies   Allergen Reactions    Erythromycin      Eye antibiotic ointment-reaction to the eyes swollen-daughter mentioned and is swollen noted.     Neomycin-Bacitracin Zn-Polymyx Hives     AND ITCHING    Neosporin [Bacitracin-Neomycin-Polymyxin]     Oxycodone-Acetaminophen Itching    Percocet [Oxycodone-Acetaminophen] Itching    Percodan [Oxycodone-Aspirin] Itching    Tramadol Itching    Food Diarrhea and Nausea And Vomiting     Allergic to all types of peppers       Health Maintenance   Topic Date Due    Pneumococcal 0-64 years Vaccine (1 of 1 - PPSV23) 03/12/1972    HIV screen  03/12/1981    Shingles Vaccine (1 of 2) 03/12/2016    Cervical cancer screen  06/13/2016    DTaP/Tdap/Td vaccine (1 - Tdap) 08/05/2021 (Originally 3/12/1985)    Flu vaccine (1) 09/01/2019    Diabetes screen  05/01/2020    Breast cancer screen  05/20/2020    Lipid screen  05/01/2022    Colon cancer screen colonoscopy  07/18/2022       Subjective:      Review of Systems   Constitutional: Negative for chills and fever. HENT: Negative for ear pain, hearing loss, rhinorrhea and voice change. Eyes: Negative for photophobia and visual disturbance. Respiratory: Negative for cough and shortness of breath. Cardiovascular: Negative for chest pain and palpitations. Gastrointestinal: Negative for nausea and vomiting. Endocrine: Negative. Negative for cold intolerance and heat intolerance. Genitourinary: Positive for difficulty urinating and dysuria. Negative for flank pain. Musculoskeletal: Negative for back pain and neck pain. Skin: Negative for color change and rash. Allergic/Immunologic: Negative for environmental allergies and food allergies. Neurological: Negative for dizziness, speech difficulty and headaches. Hematological: Does not bruise/bleed easily. Psychiatric/Behavioral: Negative for sleep disturbance and suicidal ideas. Objective:     Physical Exam   Constitutional: She is oriented to person, place, and time. She appears well-developed and well-nourished. HENT:   Head: Atraumatic. Right Ear: External ear normal.   Left Ear: External ear normal.   Nose: Nose normal.   Mouth/Throat: Oropharynx is clear and moist.   Eyes: Pupils are equal, round, and reactive to light. Conjunctivae are normal.   Neck: Normal range of motion. Neck supple. Cardiovascular: Normal rate, regular rhythm, S1 normal, S2 normal and normal heart sounds. Pulmonary/Chest: Effort normal and breath sounds normal.   Abdominal: Soft. Bowel sounds are normal.   Musculoskeletal: Normal range of motion. Neurological: She is alert and oriented to person, place, and time. Skin: Skin is warm and dry. Psychiatric: She has a normal mood and affect.  Her behavior is normal.   Nursing note and vitals

## 2019-08-25 ASSESSMENT — ENCOUNTER SYMPTOMS
NAUSEA: 0
VOMITING: 0
SHORTNESS OF BREATH: 0
PHOTOPHOBIA: 0
COUGH: 0
BACK PAIN: 0
COLOR CHANGE: 0
RHINORRHEA: 0
VOICE CHANGE: 0

## 2019-08-28 ENCOUNTER — OFFICE VISIT (OUTPATIENT)
Dept: UROLOGY | Age: 54
End: 2019-08-28
Payer: MEDICAID

## 2019-08-28 VITALS — BODY MASS INDEX: 38.09 KG/M2 | HEIGHT: 66 IN | TEMPERATURE: 97.8 F | WEIGHT: 237 LBS

## 2019-08-28 DIAGNOSIS — R32 URINARY INCONTINENCE, UNSPECIFIED TYPE: ICD-10-CM

## 2019-08-28 DIAGNOSIS — G71.00 MUSCULAR DYSTROPHY (HCC): ICD-10-CM

## 2019-08-28 DIAGNOSIS — N31.9 NEUROGENIC BLADDER: Primary | ICD-10-CM

## 2019-08-28 PROCEDURE — 99203 OFFICE O/P NEW LOW 30 MIN: CPT | Performed by: NURSE PRACTITIONER

## 2019-08-28 NOTE — PROGRESS NOTES
tablet 0    Cholecalciferol (VITAMIN D3) 3000 units TABS Take by mouth      diphenoxylate-atropine (LOMOTIL) 2.5-0.025 MG per tablet Take 1 tablet by mouth as needed. Bety Etta guaiFENesin (MUCINEX) 600 MG extended release tablet Take 1,200 mg by mouth 2 times daily      Diapers & Supplies MISC pullups wipes and bed pads. One month supply-patient needs appointment       No current facility-administered medications for this visit. Allergies   Allergen Reactions    Erythromycin      Eye antibiotic ointment-reaction to the eyes swollen-daughter mentioned and is swollen noted.  Neomycin-Bacitracin Zn-Polymyx Hives     AND ITCHING    Neosporin [Bacitracin-Neomycin-Polymyxin]     Oxycodone-Acetaminophen Itching    Percocet [Oxycodone-Acetaminophen] Itching    Percodan [Oxycodone-Aspirin] Itching    Tramadol Itching    Food Diarrhea and Nausea And Vomiting     Allergic to all types of peppers         Subjective:      Review of Systems   Constitutional: Negative for chills and fever. Genitourinary: Positive for frequency and urgency. Negative for difficulty urinating, dysuria and hematuria. All other systems reviewed and are negative. Objective:     Physical Exam   Constitutional: She is oriented to person, place, and time. She appears well-developed and well-nourished. No distress. HENT:   Head: Normocephalic and atraumatic. Eyes: Pupils are equal, round, and reactive to light. Conjunctivae are normal.   Neck: Normal range of motion. Neck supple. Cardiovascular: Normal rate. Pulmonary/Chest: Effort normal. No respiratory distress. Abdominal: Soft. Musculoskeletal: Normal range of motion. Neurological: She is alert and oriented to person, place, and time. Skin: Skin is warm and dry. Psychiatric: She has a normal mood and affect. Her behavior is normal. Judgment and thought content normal.   Vitals reviewed.     Temp 97.8 °F (36.6 °C) (Temporal)   Ht 5' 6\" (1.676 m)   Wt 237 lb

## 2019-08-30 RX ORDER — BETHANECHOL CHLORIDE 10 MG/1
10 TABLET ORAL 3 TIMES DAILY
Qty: 90 TABLET | Refills: 1 | Status: SHIPPED | OUTPATIENT
Start: 2019-08-30 | End: 2021-03-25

## 2019-09-30 DIAGNOSIS — G89.29 OTHER CHRONIC PAIN: ICD-10-CM

## 2019-10-02 RX ORDER — HYDROCODONE BITARTRATE AND ACETAMINOPHEN 10; 325 MG/1; MG/1
TABLET ORAL
Qty: 90 TABLET | Refills: 0 | Status: SHIPPED | OUTPATIENT
Start: 2019-10-02 | End: 2019-11-14 | Stop reason: SDUPTHER

## 2019-10-08 ENCOUNTER — TELEPHONE (OUTPATIENT)
Dept: UROLOGY | Age: 54
End: 2019-10-08

## 2019-11-14 ENCOUNTER — TELEPHONE (OUTPATIENT)
Dept: OTOLARYNGOLOGY | Age: 54
End: 2019-11-14

## 2019-11-14 DIAGNOSIS — G89.29 OTHER CHRONIC PAIN: ICD-10-CM

## 2019-11-15 RX ORDER — HYDROCODONE BITARTRATE AND ACETAMINOPHEN 10; 325 MG/1; MG/1
TABLET ORAL
Qty: 90 TABLET | Refills: 0 | Status: SHIPPED | OUTPATIENT
Start: 2019-11-15 | End: 2019-12-16 | Stop reason: SDUPTHER

## 2019-11-22 ENCOUNTER — OFFICE VISIT (OUTPATIENT)
Dept: PRIMARY CARE CLINIC | Age: 54
End: 2019-11-22
Payer: MEDICAID

## 2019-11-22 VITALS
HEIGHT: 65 IN | SYSTOLIC BLOOD PRESSURE: 128 MMHG | OXYGEN SATURATION: 98 % | BODY MASS INDEX: 39.44 KG/M2 | TEMPERATURE: 97.5 F | DIASTOLIC BLOOD PRESSURE: 78 MMHG | HEART RATE: 93 BPM | RESPIRATION RATE: 20 BRPM

## 2019-11-22 DIAGNOSIS — R15.9 INCONTINENCE OF FECES, UNSPECIFIED FECAL INCONTINENCE TYPE: Primary | ICD-10-CM

## 2019-11-22 DIAGNOSIS — G71.00 MUSCULAR DYSTROPHY (HCC): ICD-10-CM

## 2019-11-22 DIAGNOSIS — F11.90 CHRONIC NARCOTIC USE: ICD-10-CM

## 2019-11-22 LAB
ALBUMIN SERPL-MCNC: 3.6 G/DL (ref 3.5–5.2)
ALP BLD-CCNC: 118 U/L (ref 35–104)
ALT SERPL-CCNC: 23 U/L (ref 5–33)
AMPHETAMINE SCREEN, URINE: NORMAL
ANION GAP SERPL CALCULATED.3IONS-SCNC: 13 MMOL/L (ref 7–19)
AST SERPL-CCNC: 26 U/L (ref 5–32)
BARBITURATE SCREEN, URINE: NORMAL
BASOPHILS ABSOLUTE: 0 K/UL (ref 0–0.2)
BASOPHILS RELATIVE PERCENT: 0.3 % (ref 0–1)
BENZODIAZEPINE SCREEN, URINE: NORMAL
BILIRUB SERPL-MCNC: <0.2 MG/DL (ref 0.2–1.2)
BUN BLDV-MCNC: 11 MG/DL (ref 6–20)
BUPRENORPHINE URINE: NORMAL
CALCIUM SERPL-MCNC: 9.6 MG/DL (ref 8.6–10)
CHLORIDE BLD-SCNC: 102 MMOL/L (ref 98–111)
CO2: 24 MMOL/L (ref 22–29)
COCAINE METABOLITE SCREEN URINE: NORMAL
CREAT SERPL-MCNC: <0.5 MG/DL (ref 0.5–0.9)
EOSINOPHILS ABSOLUTE: 0.1 K/UL (ref 0–0.6)
EOSINOPHILS RELATIVE PERCENT: 1.1 % (ref 0–5)
GABAPENTIN SCREEN, URINE: NORMAL
GFR NON-AFRICAN AMERICAN: >60
GLUCOSE BLD-MCNC: 133 MG/DL (ref 74–109)
HCT VFR BLD CALC: 44.7 % (ref 37–47)
HEMOGLOBIN: 13.6 G/DL (ref 12–16)
IMMATURE GRANULOCYTES #: 0.1 K/UL
LYMPHOCYTES ABSOLUTE: 2.4 K/UL (ref 1.1–4.5)
LYMPHOCYTES RELATIVE PERCENT: 20.3 % (ref 20–40)
MCH RBC QN AUTO: 28.3 PG (ref 27–31)
MCHC RBC AUTO-ENTMCNC: 30.4 G/DL (ref 33–37)
MCV RBC AUTO: 93.1 FL (ref 81–99)
MDMA URINE: NORMAL
METHADONE SCREEN, URINE: NORMAL
METHAMPHETAMINE, URINE: NORMAL
MONOCYTES ABSOLUTE: 0.6 K/UL (ref 0–0.9)
MONOCYTES RELATIVE PERCENT: 4.8 % (ref 0–10)
NEUTROPHILS ABSOLUTE: 8.7 K/UL (ref 1.5–7.5)
NEUTROPHILS RELATIVE PERCENT: 73 % (ref 50–65)
OPIATE SCREEN URINE: NORMAL
OXYCODONE SCREEN URINE: NORMAL
PDW BLD-RTO: 14.6 % (ref 11.5–14.5)
PHENCYCLIDINE SCREEN URINE: NORMAL
PLATELET # BLD: 302 K/UL (ref 130–400)
PMV BLD AUTO: 10.7 FL (ref 9.4–12.3)
POTASSIUM SERPL-SCNC: 3.8 MMOL/L (ref 3.5–5)
PROPOXYPHENE SCREEN, URINE: NORMAL
RBC # BLD: 4.8 M/UL (ref 4.2–5.4)
SODIUM BLD-SCNC: 139 MMOL/L (ref 136–145)
THC SCREEN, URINE: NORMAL
TOTAL PROTEIN: 7.2 G/DL (ref 6.6–8.7)
TRICYCLIC ANTIDEPRESSANTS, UR: NORMAL
TSH SERPL DL<=0.05 MIU/L-ACNC: 0.79 UIU/ML (ref 0.27–4.2)
VITAMIN D 25-HYDROXY: 20.2 NG/ML
WBC # BLD: 12 K/UL (ref 4.8–10.8)

## 2019-11-22 PROCEDURE — 99213 OFFICE O/P EST LOW 20 MIN: CPT | Performed by: NURSE PRACTITIONER

## 2019-11-22 PROCEDURE — 80305 DRUG TEST PRSMV DIR OPT OBS: CPT | Performed by: NURSE PRACTITIONER

## 2019-11-22 RX ORDER — DIPHENOXYLATE HYDROCHLORIDE AND ATROPINE SULFATE 2.5; .025 MG/1; MG/1
1 TABLET ORAL PRN
Qty: 30 TABLET | Refills: 0 | Status: SHIPPED | OUTPATIENT
Start: 2019-11-22 | End: 2020-05-04 | Stop reason: SDUPTHER

## 2019-11-22 ASSESSMENT — ENCOUNTER SYMPTOMS
COUGH: 0
SHORTNESS OF BREATH: 0
COLOR CHANGE: 0
BACK PAIN: 0
RHINORRHEA: 0
VOICE CHANGE: 0
NAUSEA: 0
VOMITING: 0
PHOTOPHOBIA: 0

## 2019-12-05 ENCOUNTER — OFFICE VISIT (OUTPATIENT)
Dept: OTOLARYNGOLOGY | Age: 54
End: 2019-12-05
Payer: MEDICAID

## 2019-12-05 VITALS
SYSTOLIC BLOOD PRESSURE: 126 MMHG | TEMPERATURE: 97.5 F | HEIGHT: 65 IN | BODY MASS INDEX: 37.49 KG/M2 | WEIGHT: 225 LBS | DIASTOLIC BLOOD PRESSURE: 70 MMHG

## 2019-12-05 DIAGNOSIS — H69.82 EUSTACHIAN TUBE DYSFUNCTION, LEFT: Primary | ICD-10-CM

## 2019-12-05 DIAGNOSIS — Z72.0 TOBACCO ABUSE DISORDER: ICD-10-CM

## 2019-12-05 DIAGNOSIS — H65.22 CHRONIC SEROUS OTITIS MEDIA OF LEFT EAR: Primary | ICD-10-CM

## 2019-12-05 DIAGNOSIS — H69.83 ETD (EUSTACHIAN TUBE DYSFUNCTION), BILATERAL: ICD-10-CM

## 2019-12-05 PROCEDURE — 99213 OFFICE O/P EST LOW 20 MIN: CPT | Performed by: NURSE PRACTITIONER

## 2019-12-05 PROCEDURE — 69433 CREATE EARDRUM OPENING: CPT | Performed by: OTOLARYNGOLOGY

## 2019-12-05 PROCEDURE — 92567 TYMPANOMETRY: CPT | Performed by: AUDIOLOGIST

## 2019-12-05 ASSESSMENT — ENCOUNTER SYMPTOMS
EYES NEGATIVE: 1
RESPIRATORY NEGATIVE: 1
ALLERGIC/IMMUNOLOGIC NEGATIVE: 1
GASTROINTESTINAL NEGATIVE: 1

## 2020-01-02 ENCOUNTER — PROCEDURE VISIT (OUTPATIENT)
Dept: OTOLARYNGOLOGY | Age: 55
End: 2020-01-02
Payer: MEDICAID

## 2020-01-02 ENCOUNTER — OFFICE VISIT (OUTPATIENT)
Dept: OTOLARYNGOLOGY | Age: 55
End: 2020-01-02
Payer: MEDICAID

## 2020-01-02 VITALS
HEIGHT: 65 IN | TEMPERATURE: 97.7 F | SYSTOLIC BLOOD PRESSURE: 128 MMHG | BODY MASS INDEX: 37.49 KG/M2 | DIASTOLIC BLOOD PRESSURE: 82 MMHG | WEIGHT: 225 LBS

## 2020-01-02 PROCEDURE — 92552 PURE TONE AUDIOMETRY AIR: CPT | Performed by: AUDIOLOGIST

## 2020-01-02 PROCEDURE — 99213 OFFICE O/P EST LOW 20 MIN: CPT | Performed by: NURSE PRACTITIONER

## 2020-01-02 RX ORDER — DIPHENOXYLATE HCL/ATROPINE 2.5-.025/5
5 LIQUID (ML) ORAL 4 TIMES DAILY PRN
COMMUNITY
End: 2020-09-22 | Stop reason: SDUPTHER

## 2020-01-02 NOTE — PROGRESS NOTES
Office Visit     Patient Care Team: Patient Care Team:  Twylla Dubin, APRN as PCP - General (Family Nurse Practitioner)  Twylla Dubin, APRN as PCP - [facility]  Cathryn Martell MD as Neurologist (Neurology)  Homero Flowers MD as Consulting Physician (Otolaryngology)  Surgery: No surgery found No surgery found    Chief Complaint:  Ear Problem      Ulises Palacios is a 49 y.o. female who is here for follow up. These symptoms initially started 1 year(s). The patient symptoms have been are improving. The symptoms are aggravated by nothing. The symptoms are alleviated by  a tube. She reports symptoms are significantly improved. She denies any complaints. DATA REVIEWED THIS VISIT:   Audiogram    Procedure visit     2020  [facility]   [address] [place] [address], [facility]   Audiology   Chronic serous otitis media of left ear   Dx   Post-Op Check   Reason for Visit    Progress Notes        History   Ulises Palacios is a 49 y.o. female who presented to the clinic this date status post left PE tube placement. She reported a sharp pain after getting out of the shower a few days ago. No other problems or concerns were reported.     Summary   Pure tone testing indicates hearing WNL bilaterally.     Results   Otoscopy:   · Right: Clear EAC/Normal TM  · Left: PE tube in TM     Audiometry:   · Right: Hearing WNL   · Left: Hearing WNL           Plan   Results of today's testing were discussed with Ms. Kt Lema and the following recommendations were made:     1. Follow up with ENT as scheduled.           Audiogram and Acoustic Immittance                This data has been reviewed by SHANKAR Lewis and treatment implemented as necessary.            Past Medical History:   Diagnosis Date    Anxiety     Depression     Hyperlipidemia     Muscular dystrophy ([address]     Urinary incontinence           Past Surgical History:   Procedure Laterality Date    APPENDECTOMY      CATARACT REMOVAL       SECTION      organization: None     Attends meetings of clubs or organizations: None     Relationship status: None    Intimate partner violence:     Fear of current or ex partner: None     Emotionally abused: None     Physically abused: None     Forced sexual activity: None   Other Topics Concern    None   Social History Narrative    None          Current Outpatient Medications   Medication Sig Dispense Refill    diphenoxylate-atropine (LOMOTIL) 2.5-0.025 MG/5ML liquid Take 5 mLs by mouth 4 times daily as needed.  HYDROcodone-acetaminophen (NORCO)  MG per tablet TAKE (1) TABLET EVERY EIGHT HOURS AS NEEDED FOR PAIN. 90 tablet 0    guaiFENesin (MUCINEX) 600 MG extended release tablet Take 1,200 mg by mouth 2 times daily      Diapers & Supplies MISC pullups wipes and bed pads. One month supply-patient needs appointment      bethanechol (URECHOLINE) 10 MG tablet Take 1 tablet by mouth 3 times daily (Patient not taking: Reported on 12/5/2019) 90 tablet 1    Cholecalciferol (VITAMIN D3) 3000 units TABS Take by mouth       No current facility-administered medications for this visit. Review of Systems   Constitutional: Negative. HENT:        See HPI   Eyes: Negative. Respiratory: Negative. Cardiovascular: Negative. Gastrointestinal: Negative. Endocrine: Negative. Genitourinary: Negative. Musculoskeletal: Negative. Skin: Negative. Allergic/Immunologic: Negative. Neurological: Negative. Hematological: Negative. Psychiatric/Behavioral: Negative.         Physical Exam  /82   Temp 97.7 °F (36.5 °C)   Ht 5' 5\" (1.651 m)   Wt 225 lb (102.1 kg)   LMP  (LMP Unknown)   BMI 37.44 kg/m²     CONSTITUTIONAL: well nourished, well-developed, alert, oriented, in no acute distress     COMMUNICATION AND VOICE: able to communicate normally, normal voice quality    HEAD: normocephalic, no lesions, atraumatic, no tenderness, no masses     EYES: ocular motility normal, eyelids normal, orbits normal, no proptosis, conjunctiva normal , pupils equal, round     FACE: appearance normal, no lesions, no tenderness, no deformities, facial motion symmetric. Canda Nay SALIVARY GLANDS: parotid glands with no tenderness, no swelling, no masses, submandibular glands with normal size, nontender    EARS:  Hearing: hearing to conversational voice intact bilaterally     Ears:     Right Ear:     External: external ears normal     Otoscopy Ear Canal: canal clear     Otoscopy TM: TM's normal       Left Ear:     External: external ears normal     Otoscopy Ear Canal: canal clear     Otoscopy TM: ear tubes:  patent dry good position      NOSE:    External nose is without deformity    Nose: nares normal      Nasal membranes are without lesions, vestibule without normal limits. ORAL:    Oral:    Lips: normal upper and lower lips without lesion    Teeth: good dentition    Oropharynx:normal Tonsils are normal to inspection without masses or lesions. Palate and uvula are normal.  Posterior pharynx without lesions or erythema. Oral         mucosa is moist without lesions. Tongue: normal tongue is normal to inspection without lesions, normal tongue mobility, lingual mucosa normal    Floor of mouth: Warthin ducts patent, mucosa normal       LARYNGEAL:      Hypopharynx: not examined     Larynx: not examined       NECK:     Inspection and Palpation: neck appearance normal, no masses or       Tenderness. Thyroid is normal without enlargement or nodules palpated. LYMPHATIC: No cervical lymphadenopathy noted. CHEST/RESPIRATORY: normal respiratory effort, no shortness of breath. CARDIOVASCULAR: no cyanosis or edema       NEUROLOGICAL/PSYCHIATRIC: oriented to time, place and person,       mood normal, affect appropriate, CN II-XII intact grossly      Assessment/ Plan:       Diagnosis Orders   1. Chronic serous otitis media of left ear     2. ETD (Eustachian tube dysfunction), bilateral     3.  Tobacco abuse

## 2020-01-16 NOTE — TELEPHONE ENCOUNTER
[facility] called and patient needs new orders for diabetic supplies and protein shakes  Please call and will come  script when ready

## 2020-01-17 RX ORDER — KETOTIFEN FUMARATE 0.025 %
DROPS OPHTHALMIC (EYE)
Qty: 30 BOTTLE | Refills: 5 | Status: SHIPPED | OUTPATIENT
Start: 2020-01-17 | End: 2020-02-28 | Stop reason: SDUPTHER

## 2020-01-27 RX ORDER — HYDROCODONE BITARTRATE AND ACETAMINOPHEN 10; 325 MG/1; MG/1
1 TABLET ORAL EVERY 8 HOURS PRN
Qty: 90 TABLET | Refills: 0 | Status: SHIPPED | OUTPATIENT
Start: 2020-01-27 | End: 2020-02-28 | Stop reason: SDUPTHER

## 2020-02-18 RX ORDER — HYDROCODONE BITARTRATE AND ACETAMINOPHEN 10; 325 MG/1; MG/1
TABLET ORAL
Qty: 90 TABLET | Refills: 0 | OUTPATIENT
Start: 2020-02-18

## 2020-02-28 ENCOUNTER — OFFICE VISIT (OUTPATIENT)
Dept: PRIMARY CARE CLINIC | Age: 55
End: 2020-02-28
Payer: MEDICAID

## 2020-02-28 VITALS
HEART RATE: 86 BPM | DIASTOLIC BLOOD PRESSURE: 80 MMHG | OXYGEN SATURATION: 95 % | SYSTOLIC BLOOD PRESSURE: 138 MMHG | TEMPERATURE: 97.4 F

## 2020-02-28 PROCEDURE — 80305 DRUG TEST PRSMV DIR OPT OBS: CPT | Performed by: NURSE PRACTITIONER

## 2020-02-28 PROCEDURE — 99213 OFFICE O/P EST LOW 20 MIN: CPT | Performed by: NURSE PRACTITIONER

## 2020-02-28 RX ORDER — KETOTIFEN FUMARATE 0.025 %
DROPS OPHTHALMIC (EYE)
Qty: 30 BOTTLE | Refills: 5 | Status: SHIPPED | OUTPATIENT
Start: 2020-02-28 | End: 2021-03-23 | Stop reason: SDUPTHER

## 2020-02-28 RX ORDER — POLYETHYLENE GLYCOL 3350 17 G/17G
17 POWDER, FOR SOLUTION ORAL DAILY
COMMUNITY
End: 2021-07-13

## 2020-02-28 RX ORDER — METHYLPREDNISOLONE ACETATE 80 MG/ML
80 INJECTION, SUSPENSION INTRA-ARTICULAR; INTRALESIONAL; INTRAMUSCULAR; SOFT TISSUE ONCE
Status: COMPLETED | OUTPATIENT
Start: 2020-02-28 | End: 2020-02-28

## 2020-02-28 RX ORDER — HYDROCODONE BITARTRATE AND ACETAMINOPHEN 10; 325 MG/1; MG/1
1 TABLET ORAL EVERY 8 HOURS PRN
Qty: 90 TABLET | Refills: 0 | Status: SHIPPED | OUTPATIENT
Start: 2020-02-28 | End: 2020-04-22 | Stop reason: SDUPTHER

## 2020-02-28 RX ADMIN — METHYLPREDNISOLONE ACETATE 80 MG: 80 INJECTION, SUSPENSION INTRA-ARTICULAR; INTRALESIONAL; INTRAMUSCULAR; SOFT TISSUE at 14:10

## 2020-02-28 ASSESSMENT — ENCOUNTER SYMPTOMS
SHORTNESS OF BREATH: 0
COUGH: 0
RHINORRHEA: 0
BACK PAIN: 0
VOMITING: 0
VOICE CHANGE: 0
NAUSEA: 0
PHOTOPHOBIA: 0
COLOR CHANGE: 0

## 2020-02-28 NOTE — PROGRESS NOTES
After obtaining consent, and per orders of SHANKAR Barrera, an injection of methylPREDNISolone acetate (DEPO-MEDROL) injection 80 mg  was given in Right upper quad. gluteus by Betito Moser. Patient tolerated well with no immediate adverse reaction. Patient was advised to remain in the exam room for 20 minutes and report any adverse reactions to me immediately.

## 2020-02-28 NOTE — PROGRESS NOTES
[facility]  [address] [address]  [address] [address]  Dept: [phone]  Dept Fax: [phone]  Loc: [phone]    Prisca Banda is a 49 y.o. female who presents today for her medical conditions/complaints as noted below. Prisca Banda is c/o of Medication Check (3 month follow up, update medication contract); Neck Pain (Having neck pain for a week, has been using biofreeze and heating pad without relief); and Constipation (Already taking miralax and states the norco keeps her constipated)    HPI:     HPI   Chief Complaint   Patient presents with    Medication Check     3 month follow up, update medication contract    Neck Pain     Having neck pain for a week, has been using biofreeze and heating pad without relief    Constipation     Already taking miralax and states the norco keeps her constipated     Patient presents today for follow-up muscular dystrophy and medication refills. She reports medication has pain well-controlled. She does require lomotil for chronic diarrhea at times; but patient is stating she is constipated and needing miralax. She is on Norco 10 TID which keeps her constipated. Uncertain how often patient is taking lomotil. I advised that she take daily miralax and stop lomotil.      Lab Results   Component Value Date     11/22/2019    K 3.8 11/22/2019     11/22/2019    CO2 24 11/22/2019    BUN 11 11/22/2019    CREATININE <0.5 11/22/2019    GLUCOSE 133 (H) 11/22/2019    CALCIUM 9.6 11/22/2019    PROT 7.2 11/22/2019    LABALBU 3.6 11/22/2019    BILITOT <0.2 11/22/2019    ALKPHOS 118 (H) 11/22/2019    AST 26 11/22/2019    ALT 23 11/22/2019    LABGLOM >60 11/22/2019       Lab Results   Component Value Date    WBC 12.0 (H) 11/22/2019    HGB 13.6 11/22/2019    HCT 44.7 11/22/2019    MCV 93.1 11/22/2019     11/22/2019     Lab Results   Component Value Date    CHOL 326 (H) 05/01/2017     Lab Results   Component Value Date TRIG 154 2017     Lab Results   Component Value Date    HDL 48 (L) 2017     Lab Results   Component Value Date    LDLCALC 247 2017     No results found for: LABVLDL, VLDL  No results found for: [facility]  Lab Results   Component Value Date    TSH 0.785 2019     Lab Results   Component Value Date    VITD25 20.2 (L) 2019         Past Medical History:   Diagnosis Date    Anxiety     Depression     Hyperlipidemia     Muscular dystrophy ([address]     Urinary incontinence       Past Surgical History:   Procedure Laterality Date    APPENDECTOMY      CATARACT REMOVAL       SECTION      CHOLECYSTECTOMY      COLONOSCOPY  2017    Dr. Ana María sierra in right leg    NECK SURGERY      extra ligament    TYMPANOSTOMY TUBE PLACEMENT         Vitals 2019/6007   SYSTOLIC 295 888 948 496 - 729   DIASTOLIC 80 82 70 78 - 80   Site Right Upper Arm - - - - -   Position Sitting - - - - -   Pulse 86 - - 93 - 116   Temp 97.4 97.7 97.5 97.5 97.8 97.8   Resp - - - 20 - -   SpO2 95 - - 98 - 98   Weight - 225 lb 225 lb - 237 lb (No Data)   Height - 5' 5\" 5' 5\" 5' 5\" 5' 6\" 5' 5\"   BMI (wt*703/ht~2) - 37.44 kg/m2 37.44 kg/m2 - 38.25 kg/m2 -   Pain Level - - - - - -   Some recent data might be hidden       Family History   Problem Relation Age of Onset    Heart Disease Mother     Diabetes Mother     Heart Disease Father     Diabetes Father     Cancer Maternal Grandmother     Heart Disease Paternal Grandfather     Heart Disease Sister        Social History     Tobacco Use    Smoking status: Current Some Day Smoker     Packs/day: 0.25     Years: 20.00     Pack years: 5.00     Types: Cigarettes     Start date:     Smokeless tobacco: Former User    Tobacco comment: used an electronic cig/smokes marijuana   Substance Use Topics    Alcohol use: No      Current Outpatient Medications   Medication Sig Status: She is alert and oriented to person, place, and time. Psychiatric:         Behavior: Behavior normal.       /80 (Site: Right Upper Arm, Position: Sitting)   Pulse 86   Temp 97.4 °F (36.3 °C)   LMP  (LMP Unknown)   SpO2 95%     Assessment:       Diagnosis Orders   1. Muscular dystrophy ([address]  Nutritional Supplements (HIGH-PROTEIN NUTRITIONAL SHAKE) LIQD   2. Other chronic pain  HYDROcodone-acetaminophen (NORCO)  MG per tablet         Plan:       Medications refilled today. She will follow-up in 3 months. SUN was reviewed today per office protocol. Report shows No discrepancies. Fill pattern is consistent from single provider(s) at single pharmacy(s). Patient given educational materials -see patient instructions. Discussed use, benefit, and side effects of prescribed medications. All patient questions answered. Pt voiced understanding. Reviewed health maintenance. Instructed to continue currentmedications, diet and exercise. Patient agreed with treatment plan. Follow up as directed. MEDICATIONS:  Orders Placed This Encounter   Medications    HYDROcodone-acetaminophen (NORCO)  MG per tablet     Sig: Take 1 tablet by mouth every 8 hours as needed for Pain for up to 30 days. Intended supply: 30 days     Dispense:  90 tablet     Refill:  0     Reduce doses taken as pain becomes manageable    Nutritional Supplements (HIGH-PROTEIN NUTRITIONAL SHAKE) LIQD     Sig: Take one daily     Dispense:  30 Bottle     Refill:  5    methylPREDNISolone acetate (DEPO-MEDROL) injection 80 mg         ORDERS:  No orders of the defined types were placed in this encounter. Follow-up:  Return in about 3 months (around 5/28/2020) for medication check. PATIENT INSTRUCTIONS:  There are no Patient Instructions on file for this visit.   Electronically signed by SHANKAR Emerson on 2/28/2020 at 3:23 PM    EMR Dragon/transcription disclaimer:  Much of thisencounter note is

## 2020-03-16 RX ORDER — NYSTATIN 100000 [USP'U]/G
POWDER TOPICAL
Qty: 1 BOTTLE | Refills: 1 | Status: SHIPPED | OUTPATIENT
Start: 2020-03-16 | End: 2021-06-02

## 2020-03-17 RX ORDER — HYDROCODONE BITARTRATE AND ACETAMINOPHEN 10; 325 MG/1; MG/1
TABLET ORAL
Qty: 90 TABLET | Refills: 0 | OUTPATIENT
Start: 2020-03-17

## 2020-03-18 ENCOUNTER — TELEPHONE (OUTPATIENT)
Dept: NEUROLOGY | Age: 55
End: 2020-03-18

## 2020-03-18 NOTE — TELEPHONE ENCOUNTER
I called and s/w the patient, upon further questioning she does state that she has a headache and has had it for a couple of days. Patient states she also has a bad sore throat, neck stiffness, congestion, and she denied cough although she was coughing while we were talking on the phone. I asked if she has had any fever, she states she doesn't think so but really doesn't know. I advised that given our current screening process and questions for patients coming into our office that at this time I would advise her to seek treatment at the urgent care or facility of her choice to verify what may actually be going on and that after her current symptoms subside we can definitely bring her in for follow up if necessary. Patient voiced understanding and states she will go on to urgent care this evening or tomorrow for evaluation. I advised to call us back if she needs further.

## 2020-03-19 ENCOUNTER — TELEPHONE (OUTPATIENT)
Dept: PRIMARY CARE CLINIC | Age: 55
End: 2020-03-19

## 2020-03-19 NOTE — TELEPHONE ENCOUNTER
Pt caregiver called and states pt has sore throat and they are not wanting to bring pt in to be seen. Pt does not have mychart and they would like to set up a telephone visit with you on 3/20/2020. Do you have time to do a phone visit?

## 2020-04-22 RX ORDER — HYDROCODONE BITARTRATE AND ACETAMINOPHEN 10; 325 MG/1; MG/1
1 TABLET ORAL EVERY 8 HOURS PRN
Qty: 90 TABLET | Refills: 0 | Status: SHIPPED | OUTPATIENT
Start: 2020-04-22 | End: 2020-05-22

## 2020-05-04 RX ORDER — DIPHENOXYLATE HYDROCHLORIDE AND ATROPINE SULFATE 2.5; .025 MG/1; MG/1
1 TABLET ORAL PRN
Qty: 30 TABLET | Refills: 0 | Status: SHIPPED | OUTPATIENT
Start: 2020-05-04 | End: 2020-08-04

## 2020-05-28 ENCOUNTER — TELEPHONE (OUTPATIENT)
Dept: PRIMARY CARE CLINIC | Age: 55
End: 2020-05-28

## 2020-05-28 RX ORDER — ONDANSETRON 4 MG/1
4 TABLET, FILM COATED ORAL 3 TIMES DAILY PRN
Qty: 10 TABLET | Refills: 0 | Status: SHIPPED | OUTPATIENT
Start: 2020-05-28 | End: 2021-07-13

## 2020-06-01 ENCOUNTER — VIRTUAL VISIT (OUTPATIENT)
Dept: PRIMARY CARE CLINIC | Age: 55
End: 2020-06-01
Payer: MEDICAID

## 2020-06-01 PROCEDURE — 99214 OFFICE O/P EST MOD 30 MIN: CPT | Performed by: NURSE PRACTITIONER

## 2020-06-01 ASSESSMENT — ENCOUNTER SYMPTOMS
VOICE CHANGE: 0
VOMITING: 0
NAUSEA: 0
SHORTNESS OF BREATH: 0
COUGH: 0
PHOTOPHOBIA: 0
BACK PAIN: 0
RHINORRHEA: 0
COLOR CHANGE: 0

## 2020-06-16 RX ORDER — HYDROCODONE BITARTRATE AND ACETAMINOPHEN 10; 325 MG/1; MG/1
1 TABLET ORAL EVERY 8 HOURS PRN
Qty: 90 TABLET | Refills: 0 | Status: SHIPPED | OUTPATIENT
Start: 2020-06-20 | End: 2020-07-15 | Stop reason: SDUPTHER

## 2020-06-23 ENCOUNTER — HOSPITAL ENCOUNTER (EMERGENCY)
Age: 55
Discharge: HOME | End: 2020-06-23
Payer: MEDICAID

## 2020-06-23 ENCOUNTER — APPOINTMENT (OUTPATIENT)
Dept: CT IMAGING | Age: 55
End: 2020-06-23
Payer: MEDICAID

## 2020-06-23 ENCOUNTER — APPOINTMENT (OUTPATIENT)
Dept: GENERAL RADIOLOGY | Age: 55
End: 2020-06-23
Payer: MEDICAID

## 2020-06-23 VITALS
TEMPERATURE: 98.9 F | OXYGEN SATURATION: 95 % | SYSTOLIC BLOOD PRESSURE: 125 MMHG | DIASTOLIC BLOOD PRESSURE: 81 MMHG | RESPIRATION RATE: 19 BRPM | HEART RATE: 76 BPM

## 2020-06-23 PROCEDURE — 72128 CT CHEST SPINE W/O DYE: CPT

## 2020-06-23 PROCEDURE — 6360000002 HC RX W HCPCS: Performed by: NURSE PRACTITIONER

## 2020-06-23 PROCEDURE — 72125 CT NECK SPINE W/O DYE: CPT

## 2020-06-23 PROCEDURE — 70450 CT HEAD/BRAIN W/O DYE: CPT

## 2020-06-23 PROCEDURE — 2580000003 HC RX 258: Performed by: NURSE PRACTITIONER

## 2020-06-23 PROCEDURE — 96372 THER/PROPH/DIAG INJ SC/IM: CPT

## 2020-06-23 PROCEDURE — 73502 X-RAY EXAM HIP UNI 2-3 VIEWS: CPT

## 2020-06-23 PROCEDURE — 73700 CT LOWER EXTREMITY W/O DYE: CPT

## 2020-06-23 PROCEDURE — 72131 CT LUMBAR SPINE W/O DYE: CPT

## 2020-06-23 PROCEDURE — 99284 EMERGENCY DEPT VISIT MOD MDM: CPT

## 2020-06-23 RX ORDER — 0.9 % SODIUM CHLORIDE 0.9 %
500 INTRAVENOUS SOLUTION INTRAVENOUS ONCE
Status: COMPLETED | OUTPATIENT
Start: 2020-06-23 | End: 2020-06-23

## 2020-06-23 RX ORDER — MORPHINE SULFATE 4 MG/ML
4 INJECTION, SOLUTION INTRAMUSCULAR; INTRAVENOUS ONCE
Status: COMPLETED | OUTPATIENT
Start: 2020-06-23 | End: 2020-06-23

## 2020-06-23 RX ORDER — ORPHENADRINE CITRATE 30 MG/ML
60 INJECTION INTRAMUSCULAR; INTRAVENOUS ONCE
Status: COMPLETED | OUTPATIENT
Start: 2020-06-23 | End: 2020-06-23

## 2020-06-23 RX ADMIN — SODIUM CHLORIDE 500 ML: 9 INJECTION, SOLUTION INTRAVENOUS at 15:29

## 2020-06-23 RX ADMIN — MORPHINE SULFATE 4 MG: 4 INJECTION INTRAVENOUS at 12:35

## 2020-06-23 RX ADMIN — ORPHENADRINE CITRATE 60 MG: 30 INJECTION INTRAMUSCULAR; INTRAVENOUS at 12:36

## 2020-06-23 ASSESSMENT — ENCOUNTER SYMPTOMS
SHORTNESS OF BREATH: 0
BACK PAIN: 1
ABDOMINAL PAIN: 0

## 2020-06-23 ASSESSMENT — PAIN SCALES - GENERAL
PAINLEVEL_OUTOF10: 6
PAINLEVEL_OUTOF10: 6

## 2020-06-23 NOTE — ED PROVIDER NOTES
[facility]  eMERGENCY dEPARTMENT eNCOUnter      Pt Name: Koby Monroy  MRN: [id number]  [place] 1965  Date of evaluation: 6/23/2020  Provider: SHANKAR Dunaway    CHIEF COMPLAINT       Chief Complaint   Patient presents with   Roselia Lipscomb     arrived via EMS from home with a fall from chair Hx of aram AGUIRRE of back pain and back of head         HISTORY OF PRESENT ILLNESS   (Location/Symptom, Timing/Onset,Context/Setting, Quality, Duration, Modifying Factors, Severity)  Note limiting factors. Parish Zambrano a 48 y.o. female who presents to the emergency department for evaluation of fall. Pt tells me that she has muscular dystrophy followed by Dr. Rosa M Kenny. She relates that she is generally able to walk short distances however moves about generally in motorized wheelchair. She relates that she got out of bed to get food and fell on hardwood surface at home. She tells me that she fell backwards striking her head. She has upper and lower back pain. She tells me that she has headache along back of her head. She has left hip pain. She denies associated chest pain as well as any abdominal pain. She denies other extremity injury. She has had no fevers or constitutional symptoms of illness. HPI    Nursing Notes were reviewed. REVIEW OF SYSTEMS    (2-9 systems for level 4, 10 or more for level 5)     Review of Systems   Constitutional: Negative. Respiratory: Negative for shortness of breath. Cardiovascular: Negative for chest pain. Gastrointestinal: Negative for abdominal pain. Musculoskeletal: Positive for back pain and neck pain. Neurological: Positive for weakness (pt does not endorse any new or worsening weakness to me) and headaches. All other systems reviewed and are negative. A complete review of systems was performed and is negative except as noted above in the HPI.        PAST MEDICAL HISTORY     Past Medical History:   Diagnosis Date    Anxiety     Depression     Hyperlipidemia     [address] [facility]     Urinary incontinence          SURGICAL HISTORY       Past Surgical History:   Procedure Laterality Date    APPENDECTOMY      CATARACT REMOVAL       SECTION      CHOLECYSTECTOMY      COLONOSCOPY  2017    Dr. Angela sierra in right leg    NECK SURGERY      extra ligament    TYMPANOSTOMY TUBE PLACEMENT           CURRENT MEDICATIONS       Discharge Medication List as of 2020  4:48 PM      CONTINUE these medications which have NOT CHANGED    Details   HYDROcodone-acetaminophen (NORCO)  MG per tablet Take 1 tablet by mouth every 8 hours as needed for Pain for up to 30 days. , Disp-90 tablet, R-0Normal      ondansetron (ZOFRAN) 4 MG tablet Take 1 tablet by mouth 3 times daily as needed for Nausea or Vomiting, Disp-10 tablet, R-0Normal      tiZANidine (ZANAFLEX) 4 MG tablet Take 1 tablet by mouth every 8 hours as needed (neck pain), Disp-10 tablet, R-0Normal      nystatin (MYCOSTATIN) 633904 UNIT/GM powder Apply 3 times daily under pt apron, Disp-1 Bottle, R-1, Normal      polyethylene glycol (GLYCOLAX) powder Take 17 g by mouth dailyHistorical Med      Nutritional Supplements (HIGH-PROTEIN NUTRITIONAL SHAKE) LIQD Take one daily, Disp-30 Bottle, R-5Normal      Diapers & Supplies MISC Disp-120 each, R-5, PrintXL pullups wipes and bed pads and gloves      diphenoxylate-atropine (LOMOTIL) 2.5-0.025 MG/5ML liquid Take 5 mLs by mouth 4 times daily as needed. Historical Med      bethanechol (URECHOLINE) 10 MG tablet Take 1 tablet by mouth 3 times daily, Disp-90 tablet, R-1Normal      Cholecalciferol (VITAMIN D3) 3000 units TABS Take by mouthHistorical Med      guaiFENesin (MUCINEX) 600 MG extended release tablet Take 1,200 mg by mouth 2 times dailyHistorical Med             ALLERGIES     Erythromycin; Neomycin-bacitracin zn-polymyx; Neosporin [bacitracin-neomycin-polymyxin];  Oxycodone-acetaminophen; Percocet [oxycodone-acetaminophen]; without donor site suggestive of   degenerative change. 3. Mild left hip osteoarthritis. Signed by Dr Tj Lora on 6/23/2020 4:30 PM      CT Cervical Spine WO Contrast   Final Result   1. No evidence of cervical spine fracture. 2. Degenerative changes of cervical spine, as described. 3. Thyromegaly. The full report of this exam was immediately signed and available to   the emergency room. The patient is currently in the emergency room. Signed by Dr Yoli Holm on 6/23/2020 2:25 PM      CT Head WO Contrast   Final Result   1. 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 2. No hemorrhage, edema or mass effect. 3. Mild atrophy. Low-density in the hemispheric white matter is   nonspecific and likely due to chronic small vessel disease. The full report of this exam was immediately signed and available to   the emergency room. The patient is currently in the emergency room. Signed by Dr Yoli Holm on 6/23/2020 2:20 PM      CT Lumbar Spine WO Contrast   Final Result   1. No CT evidence of acute bony injury to the lumbar spine. 2. Diffuse spondylosis changes. Signed by Dr Hetal Way on 6/23/2020 2:26 PM      CT THORACIC SPINE WO CONTRAST   Final Result   1. No CT evidence of acute bony injury to the thoracic spine. 2. Diffuse spondylosis. 3. Question Prominent thyroid gland   Signed by Dr Hetal Way on 6/23/2020 2:21 PM      XR HIP 2-3 VW W PELVIS LEFT   Final Result   1. Small bone fragment adjacent to the left greater trochanter could   be a small avulsion injury. Enthesopathy is also in the differential.   2. There is enthesopathy of the pelvis. Signed by Dr Yoli Holm on 6/23/2020 1:23 PM            ED BEDSIDE ULTRASOUND:   Performed by ED Physician - none    LABS:  Labs Reviewed - No data to display    All other labs were within normal range or not returned as of this dictation.     RE-ASSESSMENT     Symptomatic improvement after medication. Pt remains in no distress at discharge. EMERGENCY DEPARTMENT COURSE and DIFFERENTIALDIAGNOSIS/MDM:   Vitals:    Vitals:    06/23/20 1139 06/23/20 1247 06/23/20 1624   BP: 111/66  125/81   Pulse: 77 72 76   Resp: 15  19   Temp: 98.9 °F (37.2 °C)     SpO2: 95%  95%       MDM      CONSULTS:  None    PROCEDURES:  Unless otherwise notedbelow, none     Procedures    FINAL IMPRESSION     1. Closed head injury, initial encounter    2. Contusion of back, unspecified laterality, initial encounter    3. Left hip pain    4. Fall, initial encounter          DISPOSITION/PLAN   DISPOSITION        PATIENT REFERRED TO:  Tabatha Arita[address]  [phone]    Schedule an appointment as soon as possible for a visit   As needed      DISCHARGE MEDICATIONS:       Discharge Medication List as of 6/23/2020  4:48 PM           Medication List      ASK your doctor about these medications    bethanechol 10 MG tablet  Commonly known as:  Urecholine  Take 1 tablet by mouth 3 times daily     Diapers & Supplies Misc  XL pullups wipes and bed pads and gloves     diphenoxylate-atropine 2.5-0.025 MG/5ML liquid  Commonly known as:  LOMOTIL     guaiFENesin 600 MG extended release tablet  Commonly known as:  MUCINEX     High-Protein Nutritional Shake Liqd  Take one daily     HYDROcodone-acetaminophen  MG per tablet  Commonly known as:  NORCO  Take 1 tablet by mouth every 8 hours as needed for Pain for up to 30 days.      nystatin 637247 UNIT/GM powder  Commonly known as:  MYCOSTATIN  Apply 3 times daily under pt apron     ondansetron 4 MG tablet  Commonly known as:  ZOFRAN  Take 1 tablet by mouth 3 times daily as needed for Nausea or Vomiting     polyethylene glycol 17 GM/SCOOP powder  Commonly known as:  GLYCOLAX     tiZANidine 4 MG tablet  Commonly known as:  ZANAFLEX  Take 1 tablet by mouth every 8 hours as needed (neck pain)     Vitamin D3 75 MCG (3000 UT)

## 2020-06-23 NOTE — ED NOTES
Pt resting comfortably     Sergei Farr, [facility]0 [facility]  06/23/20 3162

## 2020-07-06 ENCOUNTER — OFFICE VISIT (OUTPATIENT)
Dept: OTOLARYNGOLOGY | Age: 55
End: 2020-07-06
Payer: MEDICAID

## 2020-07-06 ENCOUNTER — TELEPHONE (OUTPATIENT)
Dept: OTOLARYNGOLOGY | Age: 55
End: 2020-07-06

## 2020-07-06 VITALS
SYSTOLIC BLOOD PRESSURE: 136 MMHG | BODY MASS INDEX: 34.99 KG/M2 | WEIGHT: 210 LBS | DIASTOLIC BLOOD PRESSURE: 74 MMHG | HEIGHT: 65 IN

## 2020-07-06 PROBLEM — Z96.22 STATUS POST MYRINGOTOMY WITH INSERTION OF TUBE: Status: ACTIVE | Noted: 2020-07-06

## 2020-07-06 PROBLEM — H92.02 OTALGIA, LEFT: Status: ACTIVE | Noted: 2020-07-06

## 2020-07-06 PROCEDURE — 99212 OFFICE O/P EST SF 10 MIN: CPT | Performed by: OTOLARYNGOLOGY

## 2020-07-06 NOTE — ASSESSMENT & PLAN NOTE
TM clear with tube in good position. No inflammation. Tender at left TMJ  Obvious caries/periodontal disease at maxillary and mandibular molar. Feel perceived ear pain is likely secondary to either TMJ and or dental disease.   Recommended the patient be seen and evaluated by her dentist

## 2020-07-06 NOTE — ASSESSMENT & PLAN NOTE
Left myringotomy tube performed as office procedure 12/5/2019  Tube still in position patent and functioning well

## 2020-07-06 NOTE — PROGRESS NOTES
 APPENDECTOMY      CATARACT REMOVAL       SECTION      CHOLECYSTECTOMY      COLONOSCOPY  2017    Dr. Cecy sierra in right leg    NECK SURGERY      extra ligament    TYMPANOSTOMY TUBE PLACEMENT           REVIEW OF SYSTEMS:  all other systems reviewed and are negative  General Health: no change in health status since last visit  Ears: ear pain Yes Left recent drainage No  Left  Hearing: no change Bilateral       Comments:     PHYSICAL EXAM:    /74   Ht 5' 5\" (1.651 m)   Wt 210 lb (95.3 kg)   LMP  (LMP Unknown)   BMI 34.95 kg/m²   Body mass index is 34.95 kg/m². General Appearance: well developed , well nourished and overweight  Head/ Face: normocephalic and atraumatic  Vocal Quality: good/ normal  Ears: Right Ear: External: TMJ tender : Yes Otoscopy Ear Canal: canal clear Otoscopy TM: TM's normal Left Ear: External: TMJ tender : Yes Otoscopy Ear Canal: canal clear Otoscopy TM: TM's normal and ear tubes:  patent dry good position  Hearing: grossly intact, Rinne A>B: Left and Rinne A>B: Right  Psych/ Mood: cooperative and no depression, anxiety or agitation    Assessment & Plan:    Problem List Items Addressed This Visit        ENT Problems    Chronic serous otitis media of left ear     No evidence of middle ear fluid. Tube in good position patent and functioning well            Other    Status post myringotomy with insertion of tube     Left myringotomy tube performed as office procedure 2019  Tube still in position patent and functioning well         Otalgia, left     TM clear with tube in good position. No inflammation. Tender at left TMJ  Obvious caries/periodontal disease at maxillary and mandibular molar. Feel perceived ear pain is likely secondary to either TMJ and or dental disease. Recommended the patient be seen and evaluated by her dentist               No orders of the defined types were placed in this encounter.       No orders of the defined types were placed in this encounter. Please note that this chart was generated using dragon dictation software. Although every effort was made to ensure the accuracy of this automated transcription, some errors in transcription may have occurred.

## 2020-07-15 NOTE — TELEPHONE ENCOUNTER
Vianey Dickson called to request a refill on her medication. Last office visit : 6-1-2020  Next office visit : 9/2/2020     Last UDS:   Amphetamine Screen, Urine   Date Value Ref Range Status   02/28/2020 neg  Final     Barbiturate Screen, Urine   Date Value Ref Range Status   02/28/2020 neg  Final     Benzodiazepine Screen, Urine   Date Value Ref Range Status   02/28/2020 neg  Final     Buprenorphine Urine   Date Value Ref Range Status   02/28/2020 neg  Final     Cocaine Metabolite Screen, Urine   Date Value Ref Range Status   02/28/2020 neg  Final     Gabapentin Screen, Urine   Date Value Ref Range Status   02/28/2020 na  Final     MDMA, Urine   Date Value Ref Range Status   02/28/2020 neg  Final     Methamphetamine, Urine   Date Value Ref Range Status   02/28/2020 neg  Final     Opiate Scrn, Ur   Date Value Ref Range Status   02/28/2020 POS  Final     Comment:     Taking NORCO - Compliant     Oxycodone Screen, Ur   Date Value Ref Range Status   02/28/2020 neg  Final     PCP Screen, Urine   Date Value Ref Range Status   02/28/2020 neg  Final     Propoxyphene Screen, Urine   Date Value Ref Range Status   02/28/2020 neg  Final     THC Screen, Urine   Date Value Ref Range Status   02/28/2020 neg  Final     Tricyclic Antidepressants, Urine   Date Value Ref Range Status   02/28/2020 neg  Final       Last Kana Delcid: 4-12  Medication Contract: 2017   Last Fill: 6-20    Requested Prescriptions     Pending Prescriptions Disp Refills    HYDROcodone-acetaminophen (NORCO)  MG per tablet 90 tablet 0     Sig: Take 1 tablet by mouth every 8 hours as needed for Pain for up to 30 days. Please approve or refuse this medication.    Ubaldo Schaffer LPN

## 2020-07-16 RX ORDER — HYDROCODONE BITARTRATE AND ACETAMINOPHEN 10; 325 MG/1; MG/1
1 TABLET ORAL EVERY 8 HOURS PRN
Qty: 90 TABLET | Refills: 0 | Status: SHIPPED | OUTPATIENT
Start: 2020-07-20 | End: 2020-08-26 | Stop reason: SDUPTHER

## 2020-08-21 NOTE — TELEPHONE ENCOUNTER
Horacio Muñoz called to request a refill on her medication. Last office visit : 6/1/2020   Next office visit : 9/2/2020     Last UDS:   Amphetamine Screen, Urine   Date Value Ref Range Status   02/28/2020 neg  Final     Barbiturate Screen, Urine   Date Value Ref Range Status   02/28/2020 neg  Final     Benzodiazepine Screen, Urine   Date Value Ref Range Status   02/28/2020 neg  Final     Buprenorphine Urine   Date Value Ref Range Status   02/28/2020 neg  Final     Cocaine Metabolite Screen, Urine   Date Value Ref Range Status   02/28/2020 neg  Final     Gabapentin Screen, Urine   Date Value Ref Range Status   02/28/2020 na  Final     MDMA, Urine   Date Value Ref Range Status   02/28/2020 neg  Final     Methamphetamine, Urine   Date Value Ref Range Status   02/28/2020 neg  Final     Opiate Scrn, Ur   Date Value Ref Range Status   02/28/2020 POS  Final     Comment:     Taking NORCO - Compliant     Oxycodone Screen, Ur   Date Value Ref Range Status   02/28/2020 neg  Final     PCP Screen, Urine   Date Value Ref Range Status   02/28/2020 neg  Final     Propoxyphene Screen, Urine   Date Value Ref Range Status   02/28/2020 neg  Final     THC Screen, Urine   Date Value Ref Range Status   02/28/2020 neg  Final     Tricyclic Antidepressants, Urine   Date Value Ref Range Status   02/28/2020 neg  Final       Last Tricia Coello: 08/21/2020  Medication Contract: 2/28/2020   Last Fill: 07/20/2020    Requested Prescriptions     Pending Prescriptions Disp Refills    HYDROcodone-acetaminophen (NORCO)  MG per tablet 90 tablet 0     Sig: Take 1 tablet by mouth every 8 hours as needed for Pain for up to 30 days. Please approve or refuse this medication.    Lexa Somers

## 2020-08-26 RX ORDER — HYDROCODONE BITARTRATE AND ACETAMINOPHEN 10; 325 MG/1; MG/1
1 TABLET ORAL EVERY 8 HOURS PRN
Qty: 90 TABLET | Refills: 0 | Status: SHIPPED | OUTPATIENT
Start: 2020-08-26 | End: 2020-10-16

## 2020-08-26 NOTE — TELEPHONE ENCOUNTER
SUN was reviewed today per office protocol. Report shows No discrepancies. Fill pattern is consistent from single provider(s) at single pharmacy(s).

## 2020-09-22 ENCOUNTER — OFFICE VISIT (OUTPATIENT)
Dept: PRIMARY CARE CLINIC | Age: 55
End: 2020-09-22
Payer: MEDICAID

## 2020-09-22 VITALS
HEART RATE: 96 BPM | OXYGEN SATURATION: 97 % | SYSTOLIC BLOOD PRESSURE: 118 MMHG | WEIGHT: 216 LBS | TEMPERATURE: 96.5 F | BODY MASS INDEX: 35.99 KG/M2 | DIASTOLIC BLOOD PRESSURE: 78 MMHG | HEIGHT: 65 IN

## 2020-09-22 PROCEDURE — 80305 DRUG TEST PRSMV DIR OPT OBS: CPT | Performed by: NURSE PRACTITIONER

## 2020-09-22 PROCEDURE — 90471 IMMUNIZATION ADMIN: CPT | Performed by: NURSE PRACTITIONER

## 2020-09-22 PROCEDURE — 99213 OFFICE O/P EST LOW 20 MIN: CPT | Performed by: NURSE PRACTITIONER

## 2020-09-22 PROCEDURE — 90686 IIV4 VACC NO PRSV 0.5 ML IM: CPT | Performed by: NURSE PRACTITIONER

## 2020-09-22 RX ORDER — DIPHENOXYLATE HYDROCHLORIDE AND ATROPINE SULFATE 2.5; .025 MG/1; MG/1
1 TABLET ORAL 4 TIMES DAILY PRN
Qty: 30 TABLET | Refills: 3 | Status: SHIPPED | OUTPATIENT
Start: 2020-09-22 | End: 2020-12-04 | Stop reason: SDUPTHER

## 2020-09-22 RX ORDER — DIPHENOXYLATE HCL/ATROPINE 2.5-.025/5
5 LIQUID (ML) ORAL 4 TIMES DAILY PRN
Qty: 5 ML | Refills: 2 | Status: SHIPPED
Start: 2020-09-22 | End: 2020-09-22 | Stop reason: CLARIF

## 2020-09-22 ASSESSMENT — ENCOUNTER SYMPTOMS
COLOR CHANGE: 0
RHINORRHEA: 0
SHORTNESS OF BREATH: 0
NAUSEA: 0
PHOTOPHOBIA: 0
VOICE CHANGE: 0
BACK PAIN: 0
VOMITING: 0
COUGH: 0

## 2020-09-22 ASSESSMENT — PATIENT HEALTH QUESTIONNAIRE - PHQ9
2. FEELING DOWN, DEPRESSED OR HOPELESS: 0
SUM OF ALL RESPONSES TO PHQ QUESTIONS 1-9: 0
SUM OF ALL RESPONSES TO PHQ9 QUESTIONS 1 & 2: 0
SUM OF ALL RESPONSES TO PHQ QUESTIONS 1-9: 0
1. LITTLE INTEREST OR PLEASURE IN DOING THINGS: 0

## 2020-09-22 NOTE — PROGRESS NOTES
[address] [facility]  [id number] [place] [address]  [address] [address]  Dept: [phone]  Dept Fax: [phone]  Loc: [phone]    Maria Del Rosario Corcoran is a 48 y.o. female who presents today for her medical conditions/complaints as noted below. Maria Del Rosario Corcoran is c/o of 3 Month Follow-Up      HPI:     HPI   Chief Complaint   Patient presents with    3 Month Follow-Up     Patient presents today for follow-up muscular dystrophy and hyperlipidemia. No recent labs for review. Patient receives [place] 10 TID from our office, however, today her UDS was positive for THC. When questioned about this patient stated she was around someone who was smoking marijuana. I discussed with patient that we can send off for [facility] confirmation but if positive we can no longer prescribe her this medication. She verbalized understanding.      Past Medical History:   Diagnosis Date    Anxiety     Depression     Hyperlipidemia     Muscular dystrophy ([address]     Urinary incontinence       Past Surgical History:   Procedure Laterality Date    APPENDECTOMY      CATARACT REMOVAL       SECTION      CHOLECYSTECTOMY      COLONOSCOPY  2017    Dr. Angel Hall      mariela in right leg    NECK SURGERY      extra ligament    TYMPANOSTOMY TUBE PLACEMENT         Vitals 2020/6114   SYSTOLIC 745 756 480 - - 873   DIASTOLIC 78 74 81 - - 66   Site - - - - - -   Position - - - - - -   Pulse 96 - 76 72 - 77   Temp 96.5 - - - - 98.9   Resp - - 19 - - 15   SpO2 97 - 95 - - 95   Weight 216 lb 210 lb - - - -   Height 5' 5\" 5' 5\" - - - -   BMI (wt*703/ht~2) 35.94 kg/m2 34.94 kg/m2 - - - -   Pain Level - - - - 6 6   Some recent data might be hidden       Family History   Problem Relation Age of Onset    Heart Disease Mother     Diabetes Mother     Heart Disease Father     Diabetes Father     Cancer Maternal Grandmother     Heart Disease Paternal Grandfather     Heart Disease Sister        Social History     Tobacco Use    Smoking status: Current Some Day Smoker     Packs/day: 0.25     Years: 20.00     Pack years: 5.00     Types: Cigarettes     Start date: 1980    Smokeless tobacco: Former User    Tobacco comment: used an electronic cig/smokes marijuana   Substance Use Topics    Alcohol use: No      Current Outpatient Medications   Medication Sig Dispense Refill    diphenoxylate-atropine (LOMOTIL) 2.5-0.025 MG/5ML liquid Take 5 mLs by mouth 4 times daily as needed (diarrhea) for up to 30 days. 5 mL 2    HYDROcodone-acetaminophen (NORCO)  MG per tablet Take 1 tablet by mouth every 8 hours as needed for Pain for up to 30 days. 90 tablet 0    ondansetron (ZOFRAN) 4 MG tablet Take 1 tablet by mouth 3 times daily as needed for Nausea or Vomiting 10 tablet 0    nystatin (MYCOSTATIN) 558605 UNIT/GM powder Apply 3 times daily under pt apron 1 Bottle 1    Nutritional Supplements (HIGH-PROTEIN NUTRITIONAL SHAKE) LIQD Take one daily 30 Bottle 5    Diapers & Supplies MISC XL pullups wipes and bed pads and gloves 120 each 5    guaiFENesin (MUCINEX) 600 MG extended release tablet Take 1,200 mg by mouth 2 times daily      tiZANidine (ZANAFLEX) 4 MG tablet Take 1 tablet by mouth every 8 hours as needed (neck pain) (Patient not taking: Reported on 9/22/2020) 10 tablet 0    polyethylene glycol (GLYCOLAX) powder Take 17 g by mouth daily      bethanechol (URECHOLINE) 10 MG tablet Take 1 tablet by mouth 3 times daily (Patient not taking: Reported on 2/28/2020) 90 tablet 1    Cholecalciferol (VITAMIN D3) 3000 units TABS Take by mouth       No current facility-administered medications for this visit. Allergies   Allergen Reactions    Erythromycin      Eye antibiotic ointment-reaction to the eyes swollen-daughter mentioned and is swollen noted.     Neomycin-Bacitracin Zn-Polymyx Hives     AND ITCHING    Neosporin [Bacitracin-Neomycin-Polymyxin]  Oxycodone-Acetaminophen Itching    Percocet [Oxycodone-Acetaminophen] Itching    Percodan [Oxycodone-Aspirin] Itching    Tramadol Itching    Food Diarrhea and Nausea And Vomiting     Allergic to all types of peppers       Health Maintenance   Topic Date Due    Diabetes screen  05/01/2020    Breast cancer screen  05/20/2020    Flu vaccine (1) 09/01/2020    Cervical cancer screen  11/22/2020 (Originally 6/13/2016)    Shingles Vaccine (1 of 2) 11/22/2020 (Originally 3/12/2016)    HIV screen  11/22/2020 (Originally 3/12/1981)    DTaP/Tdap/Td vaccine (1 - Tdap) 08/05/2021 (Originally 3/12/1985)    Pneumococcal 0-64 years Vaccine (1 of 1 - PPSV23) 06/18/2025 (Originally 3/12/1972)    Lipid screen  05/01/2022    Colon cancer screen colonoscopy  07/18/2022    Hepatitis A vaccine  Aged Out    Hepatitis B vaccine  Aged Out    Hib vaccine  Aged Out    Meningococcal (ACWY) vaccine  Aged Out       Subjective:      Review of Systems   Constitutional: Negative for chills and fever. HENT: Negative for ear pain, hearing loss, rhinorrhea and voice change. Eyes: Negative for photophobia and visual disturbance. Respiratory: Negative for cough and shortness of breath. Cardiovascular: Negative for chest pain and palpitations. Gastrointestinal: Negative for nausea and vomiting. Endocrine: Negative. Negative for cold intolerance and heat intolerance. Genitourinary: Negative for difficulty urinating and flank pain. Musculoskeletal: Negative for back pain and neck pain. Skin: Negative for color change and rash. Allergic/Immunologic: Negative for environmental allergies and food allergies. Neurological: Negative for dizziness, speech difficulty and headaches. Hematological: Does not bruise/bleed easily. Psychiatric/Behavioral: Negative for sleep disturbance and suicidal ideas. Objective:     Physical Exam  Vitals signs and nursing note reviewed.    Constitutional:       Appearance: She is well-developed. HENT:      Head: Atraumatic. Right Ear: External ear normal.      Left Ear: External ear normal.      Nose: Nose normal.   Eyes:      Conjunctiva/sclera: Conjunctivae normal.      Pupils: Pupils are equal, round, and reactive to light. Neck:      Musculoskeletal: Normal range of motion and neck supple. Cardiovascular:      Rate and Rhythm: Normal rate and regular rhythm. Heart sounds: Normal heart sounds, S1 normal and S2 normal.   Pulmonary:      Effort: Pulmonary effort is normal.      Breath sounds: Normal breath sounds. Abdominal:      General: Bowel sounds are normal.      Palpations: Abdomen is soft. Musculoskeletal: Normal range of motion. Skin:     General: Skin is warm and dry. Neurological:      Mental Status: She is alert and oriented to person, place, and time. Psychiatric:         Behavior: Behavior normal.       /78   Pulse 96   Temp 96.5 °F (35.8 °C) (Temporal)   Ht 5' 5\" (1.651 m)   Wt 216 lb (98 kg)   LMP  (LMP Unknown)   SpO2 97%   BMI 35.94 kg/m²     Assessment:       Diagnosis Orders   1. Therapeutic drug monitoring  diphenoxylate-atropine (LOMOTIL) 2.5-0.025 MG/5ML liquid    POCT Rapid Drug Screen   2. Muscular dystrophy ([facility] [address]           Plan: We will do UDS confirmation; will no longer be able to prescribe Norco if inappropriate, unfortunately. However, given that she has muscular dystrophy we would need to ween her off this. She has evidently had issues in the past with drug screens not being appropriate. Patient given educational materials -see patient instructions. Discussed use, benefit, and side effects of prescribed medications. All patient questions answered. Pt voiced understanding. Reviewed health maintenance. Instructed to continue currentmedications, diet and exercise. Patient agreed with treatment plan. Follow up as directed.        MEDICATIONS:  Orders Placed This Encounter   Medications    diphenoxylate-atropine (LOMOTIL) 2.5-0.025 MG/5ML liquid     Sig: Take 5 mLs by mouth 4 times daily as needed (diarrhea) for up to 30 days. Dispense:  5 mL     Refill:  2         ORDERS:  Orders Placed This Encounter   Procedures    POCT Rapid Drug Screen       Follow-up:  Return in about 3 months (around 12/22/2020). PATIENT INSTRUCTIONS:  There are no Patient Instructions on file for this visit. Electronically signed by SHANKAR Strauss on 9/22/2020 at 1:27 PM    EMR Dragon/transcription disclaimer:  Much of thisencounter note is electronic transcription/translation of spoken language to printed texts. The electronic translation of spoken language may be erroneous, or at times, nonsensical words or phrases may be inadvertentlytranscribed.   Although I have reviewed the note for such errors, some may still exist.

## 2020-10-01 ENCOUNTER — OFFICE VISIT (OUTPATIENT)
Dept: GASTROENTEROLOGY | Facility: CLINIC | Age: 55
End: 2020-10-01

## 2020-10-01 VITALS
SYSTOLIC BLOOD PRESSURE: 116 MMHG | BODY MASS INDEX: 36.65 KG/M2 | HEART RATE: 104 BPM | DIASTOLIC BLOOD PRESSURE: 74 MMHG | TEMPERATURE: 96.8 F | HEIGHT: 65 IN | OXYGEN SATURATION: 98 % | WEIGHT: 220 LBS

## 2020-10-01 DIAGNOSIS — R13.14 PHARYNGOESOPHAGEAL DYSPHAGIA: ICD-10-CM

## 2020-10-01 DIAGNOSIS — D13.2 ADENOMATOUS DUODENAL POLYP: Primary | ICD-10-CM

## 2020-10-01 PROCEDURE — 99213 OFFICE O/P EST LOW 20 MIN: CPT | Performed by: NURSE PRACTITIONER

## 2020-10-01 NOTE — H&P (VIEW-ONLY)
"[facility] GASTROENTEROLOGY - OFFICE NOTE    10/1/2020    Valencia Fonseca   1965    Primary Physician: Raina Reynolds APRN    Chief Complaint   Patient presents with   • Endoscopy     Pt presents today for endo recall-last endo was 2017; Duodenal polyps   • Difficulty Swallowing     Pt states she feels like her food/medications gets \"hung\" in her throat for the last 6 months-feels like it won't go down          HISTORY OF PRESENT ILLNESS:    Valencia Fonseca is a 55 y.o. female presents for follow-up duodenal polyp. She had adenomatous duodenal polyp several years ago.       Dysphagia  This started about 6 mo ago. She points to the neck area where solid foods will hang. Drinking liquid does not help. No problems swallowing liquids. She has had to regurgitate to get relief. No heartburn. No coughing or wheezing. No n/v. No fever.         Last egd 2017 noting gastritis and 2 mm duodenal polyp ( resected but not retrieved).     Past Medical History:   Diagnosis Date   • Cancer (CMS/HCC)     uterine   • Depression    • Dizziness    • History of pancreatitis     15 YRS AGO   • History of uterine cancer    • Hyperlipemia    • Incontinence of urine    • Muscular dystrophy (CMS/HCC)    • Myotonic dystrophy (CMS/HCC)    • Ptosis, both eyelids    • Sleep apnea     NO MACHINE       Past Surgical History:   Procedure Laterality Date   • ANKLE SURGERY Right    • APPENDECTOMY     • CATARACT EXTRACTION Bilateral     X2   •  SECTION     • CHOLECYSTECTOMY     • CLOSED REDUCTION METATARSAL FRACTURE     • COLONOSCOPY  2009   • COLONOSCOPY N/A 2017    Procedure: COLONOSCOPY WITH ANESTHESIA;  Surgeon: Hunter Coats MD;  Location: [facility] ENDOSCOPY;  Service:    • ENDOSCOPY  2014   • ENDOSCOPY N/A 2017    Procedure: ESOPHAGOGASTRODUODENOSCOPY WITH ANESTHESIA;  Surgeon: Hunter Coats MD;  Location: [facility] ENDOSCOPY;  Service:    • EYE PTOSIS REPAIR     • FOOT SURGERY Left    • FRONTALIS " SUSPENSION Bilateral 8/31/2017    Procedure: BILATERAL UPPER LID FRONTALIS SUSPENSION WITH FASCIA;  Surgeon: Julius Hernandez MD;  [facility];  Service:    • HYSTERECTOMY     • NECK SURGERY         Outpatient Medications Marked as Taking for the 10/1/20 encounter (Office Visit) with Kiah Jackson APRN   Medication Sig Dispense Refill   • Diapers & Supplies misc pullups wipes and bed pads. One month supply-patient needs appointment     • diphenoxylate-atropine (LOMOTIL) 2.5-0.025 MG per tablet Take  by mouth As Needed.     • nystatin (MYCOSTATIN) 680795 UNIT/GM powder Apply  topically to the appropriate area as directed 3 (Three) Times a Day. For 14 days 30 g 0       Allergies   Allergen Reactions   • Neosporin [Neomycin-Bacitracin Zn-Polymyx] Hives and Itching   • Percocet [Oxycodone-Acetaminophen] Itching   • Percodan [Oxycodone-Aspirin] Itching   • Tramadol Itching       Social History     Socioeconomic History   • Marital status:      Spouse name: Not on file   • Number of children: Not on file   • Years of education: Not on file   • Highest education level: Not on file   Tobacco Use   • Smoking status: Current Every Day Smoker     Packs/day: 0.50     Years: 30.00     Pack years: 15.00     Types: Cigarettes   • Smokeless tobacco: Never Used   Substance and Sexual Activity   • Alcohol use: Yes     Comment: Rarely-2 or 3 times a year   • Drug use: No   • Sexual activity: Defer       Family History   Problem Relation Age of Onset   • Colon cancer Cousin    • Colon polyps Neg Hx    • Malig Hyperthermia Neg Hx        Review of Systems   Constitutional: Negative for chills, fever and unexpected weight change.   Respiratory: Negative for cough, shortness of breath and wheezing.    Cardiovascular: Negative for chest pain and palpitations.   Gastrointestinal: Negative for abdominal distention, abdominal pain, anal bleeding, blood in stool, constipation, diarrhea, nausea and vomiting.     "    Vitals:    10/01/20 0900   BP: 116/74   BP Location: Left arm   Patient Position: Sitting   Cuff Size: Adult   Pulse: 104   Temp: 96.8 °F (36 °C)   TempSrc: Infrared   SpO2: 98%   Weight: 99.8 kg (220 lb)   Height: 165.1 cm (65\")      Body mass index is 36.61 kg/m².    Physical Exam  Vitals signs reviewed.   Constitutional:       General: She is not in acute distress.  Cardiovascular:      Rate and Rhythm: Normal rate and regular rhythm.      Heart sounds: Normal heart sounds.   Pulmonary:      Effort: Pulmonary effort is normal.      Breath sounds: Normal breath sounds.   Abdominal:      General: Bowel sounds are normal. There is no distension.      Palpations: Abdomen is soft.      Tenderness: There is no abdominal tenderness.   Skin:     General: Skin is warm and dry.   Neurological:      Mental Status: She is alert.         Results for orders placed or performed during the hospital encounter of 07/18/17   Urease For H Pylori    Specimen: Stomach; Tissue   Result Value Ref Range    Urease Negative Negative   Tissue Pathology Exam    Specimen: Large Intestine; Polyp   Result Value Ref Range    Case Report       Surgical Pathology Report                         Case: [id number]                                  Authorizing Provider:  Hunter Coats MD       Collected:           07/18/2017 11:06 AM          Ordering Location:     [facility]     Received:            07/18/2017 01:22 PM                                 ENDOSCOPY                                                                    Pathologist:           April Overton MD                                                         Specimen:    Large Intestine, polyp at descending                                                       Clinical Information       PCP Julius Quiles       Final Diagnosis       Large intestine, descending colon, polypectomy: No tissue for histologic evaluation.    Comment: Please refer to the gross " "description.                                                                                  4      Gross Description       Specimen #1 is received in formalin and labeled with the patient's name, date of birth, and \"polyp at descending colon\".  It consists of a possible tissue fragment measuring less than 0.1 cm.  The entire contents of the specimen container are filtered through a sponge and submitted as 1A for possible tissue identification.      Microscopic Description       There is no tissue present for histologic evaluation.      Embedded Images             ASSESSMENT AND PLAN    Assessment/Plan     Valencia was seen today for endoscopy and difficulty swallowing.    Diagnoses and all orders for this visit:    Adenomatous duodenal polyp  Comments:  history of duodenal polyp several years ago,   Orders:  -     Case Request; Standing  -     Case Request    Pharyngoesophageal dysphagia  -     Case Request; Standing  -     Case Request    Other orders  -     Follow Anesthesia Guidelines / Protocol; Future  -     Obtain Informed Consent; Future    Plan for EGD.      In regards to dysphagia, differential diagnoses discussed.  Question if more of oropharyngeal. Will proceed with egd and if negative then may consider swallowing study. Recommend cut food small pieces and chew well.        ESOPHAGOGASTRODUODENOSCOPY WITH ANESTHESIA (N/A)   Risk, benefits, and alternatives of endoscopy were explained in full.  They understand that there is a risk of bleeding, perforation, and infection.  The risk of perforation goes up with esophageal dilation.  Other options to evaluate UGI complaints could involve barium swallow or UGI series, but these would be diagnostic tests only.  Patient was given time to ask questions.  I answered them to their satisfaction and they are agreeable to proceeding         Body mass index is 36.61 kg/m².    Patient's Body mass index is 36.61 kg/m². BMI is above normal parameters. Recommendations " include: recommend weight loss,no f/u .             HAILEY Alatorre Dragon/transcription disclaimer:  Much of this encounter note is electronic transcription/translation of spoken language to printed text.  The electronic translation of spoken language may be erroneous, or at times, nonsensical words or phrases may be inadvertently transcribed.  Although I have reviewed the note for such errors, some may still exist.

## 2020-10-01 NOTE — PROGRESS NOTES
"[facility] GASTROENTEROLOGY - OFFICE NOTE    10/1/2020    Valencia Fonseca   1965    Primary Physician: Raina Reynolds APRN    Chief Complaint   Patient presents with   • Endoscopy     Pt presents today for endo recall-last endo was 2017; Duodenal polyps   • Difficulty Swallowing     Pt states she feels like her food/medications gets \"hung\" in her throat for the last 6 months-feels like it won't go down          HISTORY OF PRESENT ILLNESS:    Valencia Fonseca is a 55 y.o. female presents for follow-up duodenal polyp. She had adenomatous duodenal polyp several years ago.       Dysphagia  This started about 6 mo ago. She points to the neck area where solid foods will hang. Drinking liquid does not help. No problems swallowing liquids. She has had to regurgitate to get relief. No heartburn. No coughing or wheezing. No n/v. No fever.         Last egd 2017 noting gastritis and 2 mm duodenal polyp ( resected but not retrieved).     Past Medical History:   Diagnosis Date   • Cancer (CMS/HCC)     uterine   • Depression    • Dizziness    • History of pancreatitis     15 YRS AGO   • History of uterine cancer    • Hyperlipemia    • Incontinence of urine    • Muscular dystrophy (CMS/HCC)    • Myotonic dystrophy (CMS/HCC)    • Ptosis, both eyelids    • Sleep apnea     NO MACHINE       Past Surgical History:   Procedure Laterality Date   • ANKLE SURGERY Right    • APPENDECTOMY     • CATARACT EXTRACTION Bilateral     X2   •  SECTION     • CHOLECYSTECTOMY     • CLOSED REDUCTION METATARSAL FRACTURE     • COLONOSCOPY  2009   • COLONOSCOPY N/A 2017    Procedure: COLONOSCOPY WITH ANESTHESIA;  Surgeon: Hunter Coats MD;  Location: [facility] ENDOSCOPY;  Service:    • ENDOSCOPY  2014   • ENDOSCOPY N/A 2017    Procedure: ESOPHAGOGASTRODUODENOSCOPY WITH ANESTHESIA;  Surgeon: Hunter Coats MD;  Location: [facility] ENDOSCOPY;  Service:    • EYE PTOSIS REPAIR     • FOOT SURGERY Left    • FRONTALIS " SUSPENSION Bilateral 8/31/2017    Procedure: BILATERAL UPPER LID FRONTALIS SUSPENSION WITH FASCIA;  Surgeon: Julius Hernandez MD;  Location: [facility] OR [facility];  Service:    • HYSTERECTOMY     • NECK SURGERY         Outpatient Medications Marked as Taking for the 10/1/20 encounter (Office Visit) with Kiah Jackson APRN   Medication Sig Dispense Refill   • Diapers & Supplies misc pullups wipes and bed pads. One month supply-patient needs appointment     • diphenoxylate-atropine (LOMOTIL) 2.5-0.025 MG per tablet Take  by mouth As Needed.     • nystatin (MYCOSTATIN) 411223 UNIT/GM powder Apply  topically to the appropriate area as directed 3 (Three) Times a Day. For 14 days 30 g 0       Allergies   Allergen Reactions   • Neosporin [Neomycin-Bacitracin Zn-Polymyx] Hives and Itching   • Percocet [Oxycodone-Acetaminophen] Itching   • Percodan [Oxycodone-Aspirin] Itching   • Tramadol Itching       Social History     Socioeconomic History   • Marital status:      Spouse name: Not on file   • Number of children: Not on file   • Years of education: Not on file   • Highest education level: Not on file   Tobacco Use   • Smoking status: Current Every Day Smoker     Packs/day: 0.50     Years: 30.00     Pack years: 15.00     Types: Cigarettes   • Smokeless tobacco: Never Used   Substance and Sexual Activity   • Alcohol use: Yes     Comment: Rarely-2 or 3 times a year   • Drug use: No   • Sexual activity: Defer       Family History   Problem Relation Age of Onset   • Colon cancer Cousin    • Colon polyps Neg Hx    • Malig Hyperthermia Neg Hx        Review of Systems   Constitutional: Negative for chills, fever and unexpected weight change.   Respiratory: Negative for cough, shortness of breath and wheezing.    Cardiovascular: Negative for chest pain and palpitations.   Gastrointestinal: Negative for abdominal distention, abdominal pain, anal bleeding, blood in stool, constipation, diarrhea, nausea and vomiting.     "    Vitals:    10/01/20 0900   BP: 116/74   BP Location: Left arm   Patient Position: Sitting   Cuff Size: Adult   Pulse: 104   Temp: 96.8 °F (36 °C)   TempSrc: Infrared   SpO2: 98%   Weight: 99.8 kg (220 lb)   Height: 165.1 cm (65\")      Body mass index is 36.61 kg/m².    Physical Exam  Vitals signs reviewed.   Constitutional:       General: She is not in acute distress.  Cardiovascular:      Rate and Rhythm: Normal rate and regular rhythm.      Heart sounds: Normal heart sounds.   Pulmonary:      Effort: Pulmonary effort is normal.      Breath sounds: Normal breath sounds.   Abdominal:      General: Bowel sounds are normal. There is no distension.      Palpations: Abdomen is soft.      Tenderness: There is no abdominal tenderness.   Skin:     General: Skin is warm and dry.   Neurological:      Mental Status: She is alert.         Results for orders placed or performed during the hospital encounter of 07/18/17   Urease For H Pylori    Specimen: Stomach; Tissue   Result Value Ref Range    Urease Negative Negative   Tissue Pathology Exam    Specimen: Large Intestine; Polyp   Result Value Ref Range    Case Report       Surgical Pathology Report                         Case: [id number]                                  Authorizing Provider:  Hunter Coats MD       Collected:           07/18/2017 11:06 AM          [facility]     Received:            07/18/2017 01:22 PM                                 ENDOSCOPY                                                                    Pathologist:           April Overton MD                                                         Specimen:    Large Intestine, polyp at descending                                                       Clinical Information       PCP Julius Quiles       Final Diagnosis       Large intestine, descending colon, polypectomy: No tissue for histologic evaluation.    Comment: Please refer to the gross " "description.                                                                                  4      Gross Description       Specimen #1 is received in formalin and labeled with the patient's name, date of birth, and \"polyp at descending colon\".  It consists of a possible tissue fragment measuring less than 0.1 cm.  The entire contents of the specimen container are filtered through a sponge and submitted as 1A for possible tissue identification.      Microscopic Description       There is no tissue present for histologic evaluation.      Embedded Images             ASSESSMENT AND PLAN    Assessment/Plan     Valencia was seen today for endoscopy and difficulty swallowing.    Diagnoses and all orders for this visit:    Adenomatous duodenal polyp  Comments:  history of duodenal polyp several years ago,   Orders:  -     Case Request; Standing  -     Case Request    Pharyngoesophageal dysphagia  -     Case Request; Standing  -     Case Request    Other orders  -     Follow Anesthesia Guidelines / Protocol; Future  -     Obtain Informed Consent; Future    Plan for EGD.      In regards to dysphagia, differential diagnoses discussed.  Question if more of oropharyngeal. Will proceed with egd and if negative then may consider swallowing study. Recommend cut food small pieces and chew well.        ESOPHAGOGASTRODUODENOSCOPY WITH ANESTHESIA (N/A)   Risk, benefits, and alternatives of endoscopy were explained in full.  They understand that there is a risk of bleeding, perforation, and infection.  The risk of perforation goes up with esophageal dilation.  Other options to evaluate UGI complaints could involve barium swallow or UGI series, but these would be diagnostic tests only.  Patient was given time to ask questions.  I answered them to their satisfaction and they are agreeable to proceeding         Body mass index is 36.61 kg/m².    Patient's Body mass index is 36.61 kg/m². BMI is above normal parameters. Recommendations " include: recommend weight loss,no f/u .             HAILEY Alatorre Dragon/transcription disclaimer:  Much of this encounter note is electronic transcription/translation of spoken language to printed text.  The electronic translation of spoken language may be erroneous, or at times, nonsensical words or phrases may be inadvertently transcribed.  Although I have reviewed the note for such errors, some may still exist.

## 2020-10-02 ENCOUNTER — TRANSCRIBE ORDERS (OUTPATIENT)
Dept: ADMINISTRATIVE | Facility: HOSPITAL | Age: 55
End: 2020-10-02

## 2020-10-02 DIAGNOSIS — Z01.818 PRE-OP TESTING: Primary | ICD-10-CM

## 2020-10-05 ENCOUNTER — LAB (OUTPATIENT)
Dept: LAB | Facility: HOSPITAL | Age: 55
End: 2020-10-05

## 2020-10-05 PROCEDURE — U0003 INFECTIOUS AGENT DETECTION BY NUCLEIC ACID (DNA OR RNA); SEVERE ACUTE RESPIRATORY SYNDROME CORONAVIRUS 2 (SARS-COV-2) (CORONAVIRUS DISEASE [COVID-19]), AMPLIFIED PROBE TECHNIQUE, MAKING USE OF HIGH THROUGHPUT TECHNOLOGIES AS DESCRIBED BY CMS-2020-01-R: HCPCS | Performed by: INTERNAL MEDICINE

## 2020-10-05 PROCEDURE — C9803 HOPD COVID-19 SPEC COLLECT: HCPCS | Performed by: INTERNAL MEDICINE

## 2020-10-06 LAB
COVID LABCORP PRIORITY: NORMAL
SARS-COV-2 RNA RESP QL NAA+PROBE: NOT DETECTED

## 2020-10-08 ENCOUNTER — HOSPITAL ENCOUNTER (OUTPATIENT)
Facility: HOSPITAL | Age: 55
Setting detail: HOSPITAL OUTPATIENT SURGERY
Discharge: HOME | End: 2020-10-08
Attending: INTERNAL MEDICINE | Admitting: INTERNAL MEDICINE

## 2020-10-08 ENCOUNTER — TELEPHONE (OUTPATIENT)
Dept: GASTROENTEROLOGY | Facility: CLINIC | Age: 55
End: 2020-10-08

## 2020-10-08 ENCOUNTER — ANESTHESIA (OUTPATIENT)
Dept: GASTROENTEROLOGY | Facility: HOSPITAL | Age: 55
End: 2020-10-08

## 2020-10-08 ENCOUNTER — ANESTHESIA EVENT (OUTPATIENT)
Dept: GASTROENTEROLOGY | Facility: HOSPITAL | Age: 55
End: 2020-10-08

## 2020-10-08 VITALS
TEMPERATURE: 97.2 F | OXYGEN SATURATION: 95 % | RESPIRATION RATE: 18 BRPM | DIASTOLIC BLOOD PRESSURE: 81 MMHG | BODY MASS INDEX: 35.82 KG/M2 | HEART RATE: 80 BPM | WEIGHT: 215 LBS | HEIGHT: 65 IN | SYSTOLIC BLOOD PRESSURE: 136 MMHG

## 2020-10-08 PROCEDURE — 43251 EGD REMOVE LESION SNARE: CPT | Performed by: INTERNAL MEDICINE

## 2020-10-08 PROCEDURE — 25010000002 PROPOFOL 10 MG/ML EMULSION: Performed by: NURSE ANESTHETIST, CERTIFIED REGISTERED

## 2020-10-08 PROCEDURE — 43248 EGD GUIDE WIRE INSERTION: CPT | Performed by: INTERNAL MEDICINE

## 2020-10-08 RX ORDER — LIDOCAINE HYDROCHLORIDE 10 MG/ML
0.5 INJECTION, SOLUTION EPIDURAL; INFILTRATION; INTRACAUDAL; PERINEURAL ONCE AS NEEDED
Status: DISCONTINUED | OUTPATIENT
Start: 2020-10-08 | End: 2020-10-08 | Stop reason: HOSPADM

## 2020-10-08 RX ORDER — ONDANSETRON 2 MG/ML
4 INJECTION INTRAMUSCULAR; INTRAVENOUS ONCE AS NEEDED
Status: DISCONTINUED | OUTPATIENT
Start: 2020-10-08 | End: 2020-10-08 | Stop reason: HOSPADM

## 2020-10-08 RX ORDER — SODIUM CHLORIDE 0.9 % (FLUSH) 0.9 %
10 SYRINGE (ML) INJECTION AS NEEDED
Status: DISCONTINUED | OUTPATIENT
Start: 2020-10-08 | End: 2020-10-08 | Stop reason: HOSPADM

## 2020-10-08 RX ORDER — GUAIFENESIN 600 MG/1
1200 TABLET, EXTENDED RELEASE ORAL 2 TIMES DAILY
COMMUNITY

## 2020-10-08 RX ORDER — SODIUM CHLORIDE 9 MG/ML
500 INJECTION, SOLUTION INTRAVENOUS CONTINUOUS PRN
Status: DISCONTINUED | OUTPATIENT
Start: 2020-10-08 | End: 2020-10-08 | Stop reason: HOSPADM

## 2020-10-08 RX ORDER — PROPOFOL 10 MG/ML
VIAL (ML) INTRAVENOUS AS NEEDED
Status: DISCONTINUED | OUTPATIENT
Start: 2020-10-08 | End: 2020-10-08 | Stop reason: SURG

## 2020-10-08 RX ADMIN — LIDOCAINE HYDROCHLORIDE 100 MG: 20 INJECTION, SOLUTION INTRAVENOUS at 12:19

## 2020-10-08 RX ADMIN — PROPOFOL 300 MG: 10 INJECTION, EMULSION INTRAVENOUS at 12:19

## 2020-10-08 RX ADMIN — SODIUM CHLORIDE 500 ML: 9 INJECTION, SOLUTION INTRAVENOUS at 11:58

## 2020-10-08 NOTE — ANESTHESIA POSTPROCEDURE EVALUATION
Patient: Valencia Fonseca    Procedure Summary     Date: 10/08/20 Room / Location: [facility] ENDOSCOPY 5 / [facility] ENDOSCOPY    Anesthesia Start: 1213 Anesthesia Stop: 1235    Procedure: ESOPHAGOGASTRODUODENOSCOPY WITH ANESTHESIA (N/A ) Diagnosis:       Adenomatous duodenal polyp      Pharyngoesophageal dysphagia      (Adenomatous duodenal polyp [D13.2])      (Pharyngoesophageal dysphagia [R13.14])    Surgeon: Hunter Coats MD Provider: Shimon Rankin CRNA    Anesthesia Type: MAC ASA Status: 3          Anesthesia Type: MAC    Vitals  Vitals Value Taken Time   BP     Temp     Pulse 92 10/08/20 1235   Resp     SpO2 91 % 10/08/20 1235   Vitals shown include unvalidated device data.        Post Anesthesia Care and Evaluation    Patient location during evaluation: PHASE II  Patient participation: complete - patient participated  Level of consciousness: awake  Pain management: adequate  Airway patency: patent  Anesthetic complications: No anesthetic complications  Respiratory status: acceptable  Hydration status: acceptable

## 2020-10-08 NOTE — ANESTHESIA PREPROCEDURE EVALUATION
Anesthesia Evaluation     Patient summary reviewed   no history of anesthetic complications:  NPO Solid Status: > 8 hours  NPO Liquid Status: > 8 hours           Airway   Mallampati: I  TM distance: >3 FB  Neck ROM: full  No difficulty expected  Dental - normal exam     Pulmonary    (+) a smoker Current Abstained day of surgery, sleep apnea,   (-) COPD, asthma  Cardiovascular     (-) hypertension, past MI, CAD, dysrhythmias, cardiac stents, hyperlipidemia      Neuro/Psych  (+) neuromuscular disease (myotonic dystrophy), weakness (bilateral lower extremities), psychiatric history,     (-) seizures, TIA, CVA  GI/Hepatic/Renal/Endo    (-) liver disease, no renal disease, diabetes    Musculoskeletal     Abdominal    Substance History      OB/GYN          Other                        Anesthesia Plan    ASA 3     MAC     intravenous induction     Anesthetic plan, all risks, benefits, and alternatives have been provided, discussed and informed consent has been obtained with: patient.

## 2020-10-16 ENCOUNTER — TELEPHONE (OUTPATIENT)
Dept: PRIMARY CARE CLINIC | Age: 55
End: 2020-10-16

## 2020-10-16 NOTE — TELEPHONE ENCOUNTER
[facility] called and reports that patient was told she would be weaned off the norco and none has been called in yet.  Informed of lab results,rx request in chart per NATHALY YEUNG

## 2020-11-02 ENCOUNTER — OFFICE VISIT (OUTPATIENT)
Dept: NEUROLOGY | Age: 55
End: 2020-11-02
Payer: MEDICAID

## 2020-11-02 VITALS
HEART RATE: 101 BPM | BODY MASS INDEX: 35.99 KG/M2 | SYSTOLIC BLOOD PRESSURE: 137 MMHG | WEIGHT: 216 LBS | DIASTOLIC BLOOD PRESSURE: 85 MMHG | HEIGHT: 65 IN

## 2020-11-02 PROCEDURE — 99214 OFFICE O/P EST MOD 30 MIN: CPT | Performed by: PSYCHIATRY & NEUROLOGY

## 2020-11-02 NOTE — PROGRESS NOTES
REVIEW OF SYSTEMS    Constitutional: []Fever []Sweats []Chills [] Recent Injury   [x] Denies all unless marked  HENT:[x]Headache  [] Head Injury  [] Sore Throat  [] Ear Pain  [] Dizziness [] Hearing Loss   [] Denies all unless marked  Spine:  [x] Neck pain  [] Back pain  [] Sciaticia  [] Denies all unless marked  Cardiovascular:[]Chest Pain []Palpitations [] Heart Disease  [x] Denies all unless marked  Pulmonary: []Shortness of Breath []Cough   [x] Denies all unless marked  Gastrointestinal:  []Abdominal Pain  []Blood in Stool  []Diarrhea []Constipation []Nausea  []Vomiting  [x] Denies all unless marked  Genitourinary:  [] Dysuria [] Frequency  [] Incontinence [] Urgency   [x] Denies all unless marked  Musculoskeletal: [] Arthralgia  [] Myalgias [] Muscle cramps  [] Muscle twitches   [x] Denies all unless marked   Extremities:   [] Pain   [] Swelling   [x] Denies all unless marked  Skin:[] Rash  [] Color Change  [x] Denies all unless marked  Neurological:[] Visual Disturbance [] Double Vision [] Slurred Speech [] Trouble swallowing  [] Vertigo [] Tingling [] Numbness [x] Weakness [] Loss of Balance   [] Loss of Consciousness [] Memory Loss [] Seizures  [] Denies all unless marked  Psychiatric/Behavioral:[] Depression [] Anxiety  [x] Denies all unless marked  Sleep: []  Insomnia [] Sleep Disturbance [] Snoring [] Restless Legs [] Daytime Sleepiness [] Sleep Apnea  [x] Denies all unless marked

## 2020-11-02 NOTE — PROGRESS NOTES
Chief Complaint   Patient presents with    Follow-up     Patient last seen by you in 2018 for myotonia dystrophica, she is here today c/o weakness in bilat legs and weakness in the left side of her head and neck. Trenton Urena is a 48y.o. 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 She was last seen here in 2018. . I diagnosed her with this in December 2007. She is on disability for it. She sees her cardiologist about once a year. She feels that she is slowly getting weaker and losing more muscle mass. She lives in an assisted living type facility. No new medical difficulties since seen here last. Perhaps slowly getting weaker generally speaking. We had an extended discussion regarding all of the above. .she has chronic right foot pain as well which is unchanged. Biggest complaint today is 1 of pain in the left ear. Several months ago she had a myringotomy and a tube placed. Complains of intermittent pulsatile tinnitus in the left ear and pain around the left temple.   Active Ambulatory Problems     Diagnosis Date Noted    Hyperlipidemia     Anxiety     Muscular dystrophy ([address]     Shoulder pain, right 11/16/2012    Trichimoniasis 06/06/2013    Cystocele 06/06/2013    Urinary incontinence 06/26/2013    Chronic foot pain 08/01/2017    Reactive depression 08/01/2017    Superficial injury of eyelid with infection 09/03/2017    Hypokalemia 09/04/2017    Encephalopathy, metabolic 14/12/8566    Pneumonia of left lower lobe due to infectious organism 09/04/2017    Acute respiratory failure with hypoxia and hypercapnia ([address] 09/04/2017    Acquired ptosis of both eyelids 09/04/2017    History of plastic surgery 09/04/2017    Blurring of visual image of both eyes 09/05/2017    Pneumonia due to organism     Chronic serous otitis media of left ear 12/05/2019    Status post myringotomy with insertion of tube 07/06/2020    Otalgia, left 07/06/2020     Resolved Ambulatory Problems Diagnosis Date Noted    Depression 2015     No Additional Past Medical History       Past Surgical History:   Procedure Laterality Date    APPENDECTOMY      CATARACT REMOVAL       SECTION      CHOLECYSTECTOMY      COLONOSCOPY  2017    Dr. Marly Phillips      mariela in right leg    NECK SURGERY      extra ligament    TYMPANOSTOMY TUBE PLACEMENT         Family History   Problem Relation Age of Onset    Heart Disease Mother     Diabetes Mother     Heart Disease Father     Diabetes Father     Cancer Maternal Grandmother     Heart Disease Paternal Grandfather     Heart Disease Sister        Allergies   Allergen Reactions    Erythromycin      Eye antibiotic ointment-reaction to the eyes swollen-daughter mentioned and is swollen noted.     Neomycin-Bacitracin Zn-Polymyx Hives     AND ITCHING    Neosporin [Bacitracin-Neomycin-Polymyxin]     Oxycodone-Acetaminophen Itching    Percocet [Oxycodone-Acetaminophen] Itching    Percodan [Oxycodone-Aspirin] Itching    Tramadol Itching    Food Diarrhea and Nausea And Vomiting     Allergic to all types of peppers       Social History     Socioeconomic History    Marital status:      Spouse name: Not on file    Number of children: Not on file    Years of education: Not on file    Highest education level: Not on file   Occupational History    Not on file   Social Needs    Financial resource strain: Not on file    Food insecurity     Worry: Not on file     Inability: Not on file    Transportation needs     Medical: Not on file     Non-medical: Not on file   Tobacco Use    Smoking status: Current Some Day Smoker     Packs/day: 0.25     Years: 20.00     Pack years: 5.00     Types: Cigarettes     Start date:     Smokeless tobacco: Former User    Tobacco comment: used an electronic cig/smokes marijuana   Substance and Sexual Activity    Alcohol use: No    Drug use: No     Types: Marijuana    Sexual activity: Not on file   Lifestyle    Physical activity     Days per week: Not on file     Minutes per session: Not on file    Stress: Not on file   Relationships    Social connections     Talks on phone: Not on file     Gets together: Not on file     Attends Jew service: Not on file     Active member of club or organization: Not on file     Attends meetings of clubs or organizations: Not on file     Relationship status: Not on file    Intimate partner violence     Fear of current or ex partner: Not on file     Emotionally abused: Not on file     Physically abused: Not on file     Forced sexual activity: Not on file   Other Topics Concern    Not on file   Social History Narrative    Not on file       Review of Systems  Constitutional: ?Fever ? Sweats ? Chills ? Recent Injury ? Denies all unless marked   HENT:?Headache ? Head Injury ? Sore Throat ? Ear Pain ? Dizziness ? Hearing Loss ? Denies all unless marked   Spine: ? Neck pain ? Back pain ? Sciaticia ? Denies all unless marked   Cardiovascular:?Chest Pain ? Palpitations ? Heart Disease ? Denies all unless marked   Pulmonary: ? Shortness of Breath ? Cough ? Denies all unless marked   Gastrointestinal: ?Abdominal Pain ? Blood in Stool ? Diarrhea ? Constipation ? Nausea ? Vomiting ? Denies all unless marked   Genitourinary: ? Dysuria ? Frequency ? Incontinence ? Urgency ? Denies all unless marked   Musculoskeletal: ? Arthralgia ? Myalgias ? Muscle cramps ? Muscle twitches   ? Denies all unless marked   Extremities: ? Pain ? Swelling ? Denies all unless marked   Skin:? Rash ? Color Change ? Denies all unless marked   Neurological:? Visual Disturbance ? Double Vision ? Slurred Speech ? Trouble swallowing ? Vertigo ? Tingling ? Numbness ? Weakness ? Loss of Balance   ? Loss of Consciousness ? Memory Loss ? Seizures ? Denies all unless marked   Psychiatric/Behavioral:? Depression ? Anxiety ? Denies all unless marked   Sleep: ? Insomnia ? Sleep Disturbance ? Snoring ? Restless Legs ? Daytime Sleepiness ? Sleep Apnea ? Denies all unless marked          Current Outpatient Medications   Medication Sig Dispense Refill    ondansetron (ZOFRAN) 4 MG tablet Take 1 tablet by mouth 3 times daily as needed for Nausea or Vomiting 10 tablet 0    nystatin (MYCOSTATIN) 311418 UNIT/GM powder Apply 3 times daily under pt apron 1 Bottle 1    polyethylene glycol (GLYCOLAX) powder Take 17 g by mouth daily      Nutritional Supplements (HIGH-PROTEIN NUTRITIONAL SHAKE) LIQD Take one daily 30 Bottle 5    Diapers & Supplies MISC XL pullups wipes and bed pads and gloves 120 each 5    guaiFENesin (MUCINEX) 600 MG extended release tablet Take 1,200 mg by mouth 2 times daily      HYDROcodone-acetaminophen (NORCO)  MG per tablet TAKE (1) TABLET EVERY EIGHT HOURS AS NEEDED FOR PAIN (Patient not taking: Reported on 11/2/2020) 45 tablet 0    tiZANidine (ZANAFLEX) 4 MG tablet Take 1 tablet by mouth every 8 hours as needed (neck pain) (Patient not taking: Reported on 9/22/2020) 10 tablet 0    bethanechol (URECHOLINE) 10 MG tablet Take 1 tablet by mouth 3 times daily (Patient not taking: Reported on 2/28/2020) 90 tablet 1    Cholecalciferol (VITAMIN D3) 3000 units TABS Take by mouth       No current facility-administered medications for this visit. /85   Pulse 101   Ht 5' 5\" (1.651 m)   Wt 216 lb (98 kg)   LMP  (LMP Unknown)   BMI 35.94 kg/m²       Constitutional - well developed, well nourished. Eyes - conjunctiva normal.  Pupils react to light  Ear, nose, throat -hearing intact to finger rub No scars, masses, or lesions over external nose or ears, no atrophy of tongue  Neck-symmetric, no masses noted, no jugular vein distension. No bruits noted.   Respiration- chest wall appears symmetric, good expansion,   normal effort without use of accessory muscles  Cardiovascular- RRR  Musculoskeletal - no significant wasting of muscles noted, no bony deformities, gait no gross ataxia  Extremities-no clubbing, cyanosis or edema  Skin - warm, dry, and intact. No rash, erythema, or pallor. Psychiatric - mood, affect, and behavior appear normal.      Neurological exam  Awake, alert, fluent oriented x 3 appropriate affect  Attention and concentration appear appropriate  Recent and remote memory appears unremarkable  Speech  with dysarthria  No clear issues with language of fund of knowledge    Cranial Nerve Exam   CN II- Visual fields grossly unremarkable. VA adequate. PERRLA. CN III, IV,VI-EOMI, No nystagmus, conjugate eye movements, bilateral ptosis  CN VII-she has an inverted \"V\" mouth. Bilateral eyelid ptosis and weakness of all facial muscles  CN VIII-Hearing intact to finger rub  CN IX and X- Palate elevates in midline. High arched palate  CN XI-good shoulder shrug  CN XII-Tongue midline with no fasciculations or fibrillations    Motor Exam  Decreased tone and muscle atrophy throughout. Decreased finger flexion bilaterally and mild decreased weakness throughout    Reflexes   Absent throughout  No clonus ankles  No Crawford's sign bilateral hands. No Babinski sign. Tremors- no tremors in hands or head noted    Gait  She can stand with assistance but is unable to walk. She is in a power chair    Coordination  Finger to nose and DREW-unremarkable    No results found for: [id number]  Lab Results   Component Value Date    WBC 12.0 (H) 11/22/2019    HGB 13.6 11/22/2019    HCT 44.7 11/22/2019    MCV 93.1 11/22/2019     11/22/2019     Lab Results   Component Value Date     11/22/2019    K 3.8 11/22/2019     11/22/2019    CO2 24 11/22/2019    BUN 11 11/22/2019    CREATININE <0.5 11/22/2019    GLUCOSE 133 (H) 11/22/2019    CALCIUM 9.6 11/22/2019    PROT 7.2 11/22/2019    LABALBU 3.6 11/22/2019    BILITOT <0.2 11/22/2019    ALKPHOS 118 (H) 11/22/2019    AST 26 11/22/2019    ALT 23 11/22/2019    LABGLOM >60 11/22/2019           Assessment    ICD-10-CM    1.  Otalgia, left H92.02    2. Myotonic dystrophy ([facility] [address]  G71.11    3. Pulsatile tinnitus of left ear  H93. A2        Hypercholesterolemia controlled. Muscle cramping and myotonic dystrophy about the same. She needs to go back and see ENT regarding her left ear pain and pulsatile tinnitus. Plan    Return if symptoms worsen or fail to improve.     (Please note that portions of this note were completed with a voice recognition program. Efforts were made to edit the dictations but occasionally words are mis-transcribed.)

## 2020-11-03 PROBLEM — J18.9 PNEUMONIA OF LEFT LOWER LOBE DUE TO INFECTIOUS ORGANISM: Status: RESOLVED | Noted: 2017-09-04 | Resolved: 2020-11-03

## 2020-11-16 ENCOUNTER — OFFICE VISIT (OUTPATIENT)
Dept: OTOLARYNGOLOGY | Age: 55
End: 2020-11-16
Payer: MEDICAID

## 2020-11-16 ENCOUNTER — PROCEDURE VISIT (OUTPATIENT)
Dept: OTOLARYNGOLOGY | Age: 55
End: 2020-11-16
Payer: MEDICAID

## 2020-11-16 VITALS
TEMPERATURE: 98.1 F | SYSTOLIC BLOOD PRESSURE: 108 MMHG | HEIGHT: 65 IN | DIASTOLIC BLOOD PRESSURE: 68 MMHG | BODY MASS INDEX: 35.65 KG/M2 | WEIGHT: 214 LBS

## 2020-11-16 PROCEDURE — 92567 TYMPANOMETRY: CPT | Performed by: AUDIOLOGIST

## 2020-11-16 PROCEDURE — 99212 OFFICE O/P EST SF 10 MIN: CPT | Performed by: OTOLARYNGOLOGY

## 2020-11-16 NOTE — PROGRESS NOTES
[place] presented to the clinic this date for tympanometry due to complaints of left aural fullness and decreased hearing. She has previous history of left PE tube placement. Type A tympanogram right consistent with normal TM mobility. Type B tympanogram left suggests middle ear effusion.

## 2020-11-16 NOTE — PROGRESS NOTES
48 y.o.  female presents today with left ear complaints. She is complaining of left-sided fullness and hearing loss and wonders if her tube is still effective.   No discharge is reported and she has no complaints of pain or discomfort    Family History   Problem Relation Age of Onset    Heart Disease Mother     Diabetes Mother     Heart Disease Father     Diabetes Father     Cancer Maternal Grandmother     Heart Disease Paternal Grandfather     Heart Disease Sister      Social History     Socioeconomic History    Marital status:      Spouse name: None    Number of children: None    Years of education: None    Highest education level: None   Occupational History    None   Social Needs    Financial resource strain: None    Food insecurity     Worry: None     Inability: None    Transportation needs     Medical: None     Non-medical: None   Tobacco Use    Smoking status: Current Some Day Smoker     Packs/day: 0.25     Years: 20.00     Pack years: 5.00     Types: Cigarettes     Start date: 1980    Smokeless tobacco: Former User    Tobacco comment: used an electronic cig/smokes marijuana   Substance and Sexual Activity    Alcohol use: No    Drug use: No     Types: Marijuana    Sexual activity: None   Lifestyle    Physical activity     Days per week: None     Minutes per session: None    Stress: None   Relationships    Social connections     Talks on phone: None     Gets together: None     Attends Amish service: None     Active member of club or organization: None     Attends meetings of clubs or organizations: None     Relationship status: None    Intimate partner violence     Fear of current or ex partner: None     Emotionally abused: None     Physically abused: None     Forced sexual activity: None   Other Topics Concern    None   Social History Narrative    None     Past Medical History:   Diagnosis Date    Anxiety     Depression     Hyperlipidemia     Muscular dystrophy ([address]     Urinary incontinence      Past Surgical History:   Procedure Laterality Date    APPENDECTOMY      CATARACT REMOVAL       SECTION      CHOLECYSTECTOMY      COLONOSCOPY  2017    Dr. Darline sierra in right leg    NECK SURGERY      extra ligament    TYMPANOSTOMY TUBE PLACEMENT           REVIEW OF SYSTEMS:  all other systems reviewed and are negative  General Health: no change in health status since last visit  Ears: ear pain No Left recent drainage No  Left ear popping/ fullness Yes  Left constant  Hearing: worse:  Yes and  Left       Comments:     PHYSICAL EXAM:    /68   Temp 98.1 °F (36.7 °C) (Temporal)   Ht 5' 5\" (1.651 m)   Wt 214 lb (97.1 kg)   LMP  (LMP Unknown)   BMI 35.61 kg/m²   Body mass index is 35.61 kg/m². General Appearance: well developed , well nourished and wheel chair  Head/ Face: normocephalic  Vocal Quality: good/ normal  Ears: Right Ear: External: external ears normal Otoscopy Ear Canal: canal clear Otoscopy TM: TM's normal and TM's mobile Left Ear: External: external ears normal Otoscopy Ear Canal: extruded tube in canal Otoscopy TM: TM's dull and TM's sluggish  Hearing: Rinne A>B: Left, Rinne A>B: Right and see audiogram  Psych/ Mood: cooperative and no depression, anxiety or agitation    Assessment & Plan:    Problem List Items Addressed This Visit        ENT Problems    Chronic serous otitis media of left ear     Tube has extruded. Underlying TM dull. Tuning fork does not reverse but tympanogram is flat. Patient complains of left-sided hearing loss. Will have come back later in a week to replace tube               No orders of the defined types were placed in this encounter. No orders of the defined types were placed in this encounter. Please note that this chart was generated using dragon dictation software.   Although every effort was made to ensure the accuracy of this automated transcription, some errors in transcription may have occurred.

## 2020-11-24 ENCOUNTER — PROCEDURE VISIT (OUTPATIENT)
Dept: OTOLARYNGOLOGY | Age: 55
End: 2020-11-24
Payer: MEDICAID

## 2020-11-24 VITALS — HEIGHT: 65 IN | BODY MASS INDEX: 35.65 KG/M2 | WEIGHT: 214 LBS

## 2020-11-24 PROBLEM — H61.21 IMPACTED CERUMEN OF RIGHT EAR: Status: ACTIVE | Noted: 2020-11-24

## 2020-11-24 PROCEDURE — 69433 CREATE EARDRUM OPENING: CPT | Performed by: OTOLARYNGOLOGY

## 2020-11-24 PROCEDURE — 69210 REMOVE IMPACTED EAR WAX UNI: CPT | Performed by: OTOLARYNGOLOGY

## 2020-11-24 RX ORDER — CIPROFLOXACIN HYDROCHLORIDE 3.5 MG/ML
SOLUTION/ DROPS TOPICAL
Qty: 120 DROP | Refills: 0 | Status: SHIPPED | OUTPATIENT
Start: 2020-11-24 | End: 2021-03-25

## 2020-11-24 NOTE — PROGRESS NOTES
48 y.o.  female presents today with left middle ear fluid and hearing loss.   She presents today for tube placement    Family History   Problem Relation Age of Onset    Heart Disease Mother     Diabetes Mother     Heart Disease Father     Diabetes Father     Cancer Maternal Grandmother     Heart Disease Paternal Grandfather     Heart Disease Sister      Social History     Socioeconomic History    Marital status:      Spouse name: None    Number of children: None    Years of education: None    Highest education level: None   Occupational History    None   Social Needs    Financial resource strain: None    Food insecurity     Worry: None     Inability: None    Transportation needs     Medical: None     Non-medical: None   Tobacco Use    Smoking status: Current Some Day Smoker     Packs/day: 0.25     Years: 20.00     Pack years: 5.00     Types: Cigarettes     Start date: 1980    Smokeless tobacco: Former User    Tobacco comment: used an electronic cig/smokes marijuana   Substance and Sexual Activity    Alcohol use: No    Drug use: No     Types: Marijuana    Sexual activity: None   Lifestyle    Physical activity     Days per week: None     Minutes per session: None    Stress: None   Relationships    Social connections     Talks on phone: None     Gets together: None     Attends Zoroastrian service: None     Active member of club or organization: None     Attends meetings of clubs or organizations: None     Relationship status: None    Intimate partner violence     Fear of current or ex partner: None     Emotionally abused: None     Physically abused: None     Forced sexual activity: None   Other Topics Concern    None   Social History Narrative    None     Past Medical History:   Diagnosis Date    Anxiety     Depression     Hyperlipidemia     Muscular dystrophy ([address]     Urinary incontinence      Past Surgical History:   Procedure Laterality Date    APPENDECTOMY      CATARACT REMOVAL       SECTION      CHOLECYSTECTOMY      COLONOSCOPY  2017    Dr. James Iverson      mariela in right leg    NECK SURGERY      extra ligament    TYMPANOSTOMY TUBE PLACEMENT           REVIEW OF SYSTEMS:  all other systems reviewed and are negative  General Health: no change in health status since last visit  Ears: ear popping/ fullness Yes  Left constant  Hearing: hearing loss: Yes       Comments:     PHYSICAL EXAM:    Ht 5' 5\" (1.651 m)   Wt 214 lb (97.1 kg)   LMP  (LMP Unknown)   BMI 35.61 kg/m²   Body mass index is 35.61 kg/m². General Appearance: well developed , well nourished and wheel chair  Head/ Face: normocephalic and atraumatic  Vocal Quality: good/ normal  Ears: Right Ear: External: external ears normal Otoscopy Ear Canal: cerumen Otoscopy TM: TM's normal Left Ear: External: external ears normal Otoscopy Ear Canal: extruded tube in canal Otoscopy TM: TM's dull and air/fluid level  Hearing: Rinne A>B: Right  Psych/ Mood: cooperative and no depression, anxiety or agitation    Assessment & Plan:    Problem List Items Addressed This Visit        ENT Problems    Chronic serous otitis media of left ear - Primary    Relevant Orders    CO CREATE EARDRUM OPENING,LOCAL ANESTH (Completed)    Impacted cerumen of right ear     Wax obscuring view of right TM. Will clean. Relevant Orders    CO REMOVAL IMPACTED CERUMEN INSTRUMENTATION UNILAT (Completed)          Orders Placed This Encounter   Procedures    CO CREATE EARDRUM OPENING,LOCAL ANESTH     Under microscopic guidance, the topical anesthetic solution was suctioned from the left ear canal.  A small radial incision was then made in the anterior-inferior quadrant of the TM. Thin serous fluid was suctioned from the middle ear space. A Paparella 2000 tube was then placed through the defect and positioned with a pick. The patient was aware of immediate hearing improvement.   Drops were applied and the procedure terminated. Overall she tolerated the procedure well and experienced no undue discomfort. She left the clinic in good condition having undergone an uncomplicated procedure    UT REMOVAL IMPACTED CERUMEN INSTRUMENTATION UNILAT     Under microscopic guidance obstructing wax was cleared from right ear canal with suction. No orders of the defined types were placed in this encounter. Please note that this chart was generated using dragon dictation software. Although every effort was made to ensure the accuracy of this automated transcription, some errors in transcription may have occurred.

## 2020-12-03 NOTE — TELEPHONE ENCOUNTER
Gopal Guaman called to request a refill on her medication. Last office visit : 9/22/2020   Next office visit : 12/17/2020     Last UDS:   Amphetamine Screen, Urine   Date Value Ref Range Status   09/22/2020 neg  Final     Barbiturate Screen, Urine   Date Value Ref Range Status   09/22/2020 neg  Final     Benzodiazepine Screen, Urine   Date Value Ref Range Status   09/22/2020 neg  Final     Buprenorphine Urine   Date Value Ref Range Status   09/22/2020 neg  Final     Cocaine Metabolite Screen, Urine   Date Value Ref Range Status   09/22/2020 neg  Final     Gabapentin Screen, Urine   Date Value Ref Range Status   09/22/2020 neg  Final     MDMA, Urine   Date Value Ref Range Status   09/22/2020 neg  Final     Methamphetamine, Urine   Date Value Ref Range Status   09/22/2020 neg  Final     Opiate Scrn, Ur   Date Value Ref Range Status   09/22/2020 pos  Final     Oxycodone Screen, Ur   Date Value Ref Range Status   09/22/2020 neg  Final     PCP Screen, Urine   Date Value Ref Range Status   09/22/2020 neg  Final     Propoxyphene Screen, Urine   Date Value Ref Range Status   09/22/2020 neg  Final     THC Screen, Urine   Date Value Ref Range Status   09/22/2020 pos  Final     Tricyclic Antidepressants, Urine   Date Value Ref Range Status   09/22/2020 neg  Final       Last Gerri Esthela: 10-15-20  Medication Contract: 0   Last Fill:       05-04-20    Requested Prescriptions     Pending Prescriptions Disp Refills    diphenoxylate-atropine (LOMOTIL) 2.5-0.025 MG per tablet 30 tablet 0     Sig: Take 1 tablet by mouth 4 times daily for 10 days. Please approve or refuse this medication.    Anel Hayden MA

## 2020-12-04 RX ORDER — DIPHENOXYLATE HYDROCHLORIDE AND ATROPINE SULFATE 2.5; .025 MG/1; MG/1
1 TABLET ORAL 4 TIMES DAILY PRN
Qty: 30 TABLET | Refills: 0 | OUTPATIENT
Start: 2020-12-04 | End: 2021-01-04 | Stop reason: SDUPTHER

## 2020-12-04 RX ORDER — DIPHENOXYLATE HYDROCHLORIDE AND ATROPINE SULFATE 2.5; .025 MG/1; MG/1
TABLET ORAL AS NEEDED
OUTPATIENT
Start: 2020-12-04

## 2020-12-04 NOTE — TELEPHONE ENCOUNTER
Barak Hughes called requesting a refill of the below medication which has been pended for you:     Requested Prescriptions     Pending Prescriptions Disp Refills    diphenoxylate-atropine (LOMOTIL) 2.5-0.025 MG per tablet 30 tablet 0     Sig: Take 1 tablet by mouth 4 times daily as needed for Diarrhea for up to 30 days. Last Appointment Date: 9/22/2020  Next Appointment Date: 12/17/2020    Allergies   Allergen Reactions    Erythromycin      Eye antibiotic ointment-reaction to the eyes swollen-daughter mentioned and is swollen noted.     Neomycin-Bacitracin Zn-Polymyx Hives     AND ITCHING    Neosporin [Bacitracin-Neomycin-Polymyxin]     Oxycodone-Acetaminophen Itching    Percocet [Oxycodone-Acetaminophen] Itching    Percodan [Oxycodone-Aspirin] Itching    Tramadol Itching    Food Diarrhea and Nausea And Vomiting     Allergic to all types of peppers

## 2020-12-07 RX ORDER — DIPHENOXYLATE HYDROCHLORIDE AND ATROPINE SULFATE 2.5; .025 MG/1; MG/1
1 TABLET ORAL 4 TIMES DAILY
Qty: 30 TABLET | Refills: 0 | Status: SHIPPED | OUTPATIENT
Start: 2020-12-07 | End: 2020-12-17

## 2020-12-08 ENCOUNTER — OFFICE VISIT (OUTPATIENT)
Dept: OTOLARYNGOLOGY | Age: 55
End: 2020-12-08
Payer: MEDICAID

## 2020-12-08 ENCOUNTER — TELEPHONE (OUTPATIENT)
Dept: OTOLARYNGOLOGY | Age: 55
End: 2020-12-08

## 2020-12-08 ENCOUNTER — PROCEDURE VISIT (OUTPATIENT)
Dept: OTOLARYNGOLOGY | Age: 55
End: 2020-12-08
Payer: MEDICAID

## 2020-12-08 VITALS
HEIGHT: 65 IN | SYSTOLIC BLOOD PRESSURE: 126 MMHG | DIASTOLIC BLOOD PRESSURE: 78 MMHG | WEIGHT: 214 LBS | BODY MASS INDEX: 35.65 KG/M2

## 2020-12-08 PROCEDURE — 99212 OFFICE O/P EST SF 10 MIN: CPT | Performed by: OTOLARYNGOLOGY

## 2020-12-08 PROCEDURE — 92567 TYMPANOMETRY: CPT | Performed by: AUDIOLOGIST

## 2020-12-08 PROCEDURE — 92553 AUDIOMETRY AIR & BONE: CPT | Performed by: AUDIOLOGIST

## 2020-12-08 NOTE — PROGRESS NOTES
48 y.o.  female presents today for follow-up. On November 24 she had a left ear tube placed here in the clinic. She reports no problems related to the procedure.   She is not aware of any hearing improvement in the left ear although does state that the ear feels better since the tube was placed    Family History   Problem Relation Age of Onset    Heart Disease Mother     Diabetes Mother     Heart Disease Father     Diabetes Father     Cancer Maternal Grandmother     Heart Disease Paternal Grandfather     Heart Disease Sister      Social History     Socioeconomic History    Marital status:      Spouse name: None    Number of children: None    Years of education: None    Highest education level: None   Occupational History    None   Social Needs    Financial resource strain: None    Food insecurity     Worry: None     Inability: None    Transportation needs     Medical: None     Non-medical: None   Tobacco Use    Smoking status: Current Some Day Smoker     Packs/day: 0.25     Years: 20.00     Pack years: 5.00     Types: Cigarettes     Start date: 1980    Smokeless tobacco: Former User    Tobacco comment: used an electronic cig/smokes marijuana   Substance and Sexual Activity    Alcohol use: No    Drug use: No     Types: Marijuana    Sexual activity: None   Lifestyle    Physical activity     Days per week: None     Minutes per session: None    Stress: None   Relationships    Social connections     Talks on phone: None     Gets together: None     Attends Oriental orthodox service: None     Active member of club or organization: None     Attends meetings of clubs or organizations: None     Relationship status: None    Intimate partner violence     Fear of current or ex partner: None     Emotionally abused: None     Physically abused: None     Forced sexual activity: None   Other Topics Concern    None   Social History Narrative    None     Past Medical History:   Diagnosis Date    Anxiety  Depression     Hyperlipidemia     [address]cular dyst[address] ([facility] [address]     Urinary incontinence      Past Surgical History:   Procedure Laterality Date    APPENDECTOMY      CATARACT REMOVAL       SECTION      CHOLECYSTECTOMY      COLONOSCOPY  2017    Dr. Socrates sierra in right leg    NECK SURGERY      extra ligament    TYMPANOSTOMY TUBE PLACEMENT           REVIEW OF SYSTEMS:  all other systems reviewed and are negative  General Health: no change in health status since last visit  Ears: ear pain No Left recent drainage No  Left  Hearing: hearing loss: Yes       Comments:     PHYSICAL EXAM:    /78   Ht 5' 5\" (1.651 m)   Wt 214 lb (97.1 kg)   LMP  (LMP Unknown)   BMI 35.61 kg/m²   Body mass index is 35.61 kg/m². General Appearance: well developed , well nourished, no distress and wheel chair  Head/ Face: normocephalic and atraumatic  Vocal Quality: good/ normal  Ears: Right Ear: External: external ears normal Otoscopy Ear Canal: canal clear Otoscopy TM: TM's normal Left Ear: External: external ears normal Otoscopy Ear Canal: canal clear Otoscopy TM: ear tubes:  patent dry good position  Hearing: Rinne A>B: Left, Rinne A>B: Right, Air R>L and see audiogram  Psych/ Mood: cooperative and no depression, anxiety or agitation    Assessment & Plan:    Problem List Items Addressed This Visit        ENT Problems    Chronic serous otitis media of left ear     Left myringotomy tube in place. Middle ear space clear. Asymmetrical hearing loss, left     Initially after tube placement the patient felt her hearing in the left ear had improved. In the following days however the hearing deteriorated. Today's audiogram shows a definite 30 to 40 dB hearing loss in the left ear when compared to the right. Frankly this is unexplainable. She definitely had fluid in her left middle ear space when the tube was placed.   Will return in 1 week for follow-up audiogram. Will perform intratympanic steroid injection if hearing loss persists            Other    Status post myringotomy with insertion of tube     Left tube patent and in position. Apparently functioning well. No orders of the defined types were placed in this encounter. No orders of the defined types were placed in this encounter. Please note that this chart was generated using dragon dictation software. Although every effort was made to ensure the accuracy of this automated transcription, some errors in transcription may have occurred.

## 2020-12-08 NOTE — ASSESSMENT & PLAN NOTE
Initially after tube placement the patient felt her hearing in the left ear had improved. In the following days however the hearing deteriorated. Today's audiogram shows a definite 30 to 40 dB hearing loss in the left ear when compared to the right. Frankly this is unexplainable. She definitely had fluid in her left middle ear space when the tube was placed.   Will return in 1 week for follow-up audiogram.  Will perform intratympanic steroid injection if hearing loss persists

## 2020-12-08 NOTE — PROGRESS NOTES
History   Griffin Soria is a 48 y.o. female who presented to the clinic this date status post left PE tube placement. She denied improvement in hearing since procedure. Summary   Tympanometry consistent with normal TM mobility right and patent PE tube left. Pure tone testing indicates mild SNHL right and moderate to severe mixed hearing loss left. When compared with previous audiograms, right ear thresholds remained stable, left ear thresholds showed a significant drop. Results   Otoscopy:    Right: Clear EAC/Normal TM   Left: PE tube in TM    Audiometry:    Right: Mild flat SNHL (previously established)   Left: Moderate to severe sloping mixed hearing loss         Tympanometry:     Right: Type A   Left: Type B large volume    Plan   Results of today's testing were discussed with Ms. Samson and the following recommendations were made:    1. Follow up with ENT as scheduled. 2. Recheck hearing following medical management.         Audiogram and Acoustic Immittance

## 2020-12-10 NOTE — TELEPHONE ENCOUNTER
I called the patient back to give her the number for billing, patient states she is no longer concerned as it shows she does not have a balance.

## 2020-12-14 ENCOUNTER — PROCEDURE VISIT (OUTPATIENT)
Dept: OTOLARYNGOLOGY | Age: 55
End: 2020-12-14
Payer: MEDICAID

## 2020-12-14 PROCEDURE — 92567 TYMPANOMETRY: CPT | Performed by: AUDIOLOGIST

## 2020-12-14 PROCEDURE — 92552 PURE TONE AUDIOMETRY AIR: CPT | Performed by: AUDIOLOGIST

## 2020-12-14 NOTE — PROGRESS NOTES
History   Jeanne Swartz is a 48 y.o. female who presented to the clinic this date for a recheck of hearing in her left ear due to significant change in hearing noted on audiogram. She denied improvement since last visit. Summary   Tympanometry consistent with patent PE tube in the left ear. Pure tone testing indicates continued moderate to severe mixed hearing loss in the left ear. Results   Otoscopy:   ? Left: PE tube in TM    Audiometry:   ? Left: moderate to severe sloping mixed hearing loss         Tympanometry:    ? Left: Type B    Plan   Results of today's testing were discussed with Macarena Larsen and the following recommendations were made:    1. Follow up with ENT as scheduled. 2. Recheck hearing following medical management.         Audiogram and Acoustic Immittance

## 2020-12-16 ENCOUNTER — PROCEDURE VISIT (OUTPATIENT)
Dept: OTOLARYNGOLOGY | Age: 55
End: 2020-12-16
Payer: MEDICAID

## 2020-12-16 VITALS
DIASTOLIC BLOOD PRESSURE: 82 MMHG | SYSTOLIC BLOOD PRESSURE: 124 MMHG | BODY MASS INDEX: 35.65 KG/M2 | HEIGHT: 65 IN | WEIGHT: 214 LBS

## 2020-12-16 PROCEDURE — 69801 INCISE INNER EAR: CPT | Performed by: OTOLARYNGOLOGY

## 2020-12-16 NOTE — ASSESSMENT & PLAN NOTE
Patient returns with persistent left-sided hearing loss. Intratympanic dexamethasone was administered.   She tolerated the procedure well and will return in 2 weeks for follow-up audiometric testing

## 2020-12-17 ENCOUNTER — TELEPHONE (OUTPATIENT)
Dept: PRIMARY CARE CLINIC | Age: 55
End: 2020-12-17

## 2021-01-04 DIAGNOSIS — R15.9 INCONTINENCE OF FECES, UNSPECIFIED FECAL INCONTINENCE TYPE: ICD-10-CM

## 2021-01-04 RX ORDER — DIPHENOXYLATE HYDROCHLORIDE AND ATROPINE SULFATE 2.5; .025 MG/1; MG/1
1 TABLET ORAL 4 TIMES DAILY PRN
Qty: 30 TABLET | Refills: 0 | Status: SHIPPED | OUTPATIENT
Start: 2021-01-04 | End: 2021-04-27 | Stop reason: SDUPTHER

## 2021-01-18 ENCOUNTER — OFFICE VISIT (OUTPATIENT)
Dept: ENT CLINIC | Age: 56
End: 2021-01-18
Payer: MEDICAID

## 2021-01-18 ENCOUNTER — PROCEDURE VISIT (OUTPATIENT)
Dept: ENT CLINIC | Age: 56
End: 2021-01-18
Payer: MEDICAID

## 2021-01-18 VITALS
SYSTOLIC BLOOD PRESSURE: 124 MMHG | WEIGHT: 214 LBS | DIASTOLIC BLOOD PRESSURE: 72 MMHG | BODY MASS INDEX: 35.65 KG/M2 | HEIGHT: 65 IN

## 2021-01-18 DIAGNOSIS — Z96.22 STATUS POST MYRINGOTOMY WITH INSERTION OF TUBE: ICD-10-CM

## 2021-01-18 PROCEDURE — 92552 PURE TONE AUDIOMETRY AIR: CPT | Performed by: AUDIOLOGIST

## 2021-01-18 PROCEDURE — 99212 OFFICE O/P EST SF 10 MIN: CPT | Performed by: OTOLARYNGOLOGY

## 2021-01-18 NOTE — PROGRESS NOTES
48 y.o.  female presents today with a history of asymmetric left-sided hearing loss. On December 16 she underwent intratympanic injection of dexamethasone into the affected (left) ear. She returns today for follow-up evaluation and hearing testing.   Unfortunately she is not aware of any hearing improvement following the procedure    Family History   Problem Relation Age of Onset    Heart Disease Mother     Diabetes Mother     Heart Disease Father     Diabetes Father     Cancer Maternal Grandmother     Heart Disease Paternal Grandfather     Heart Disease Sister      Social History     Socioeconomic History    Marital status:      Spouse name: Not on file    Number of children: Not on file    Years of education: Not on file    Highest education level: Not on file   Occupational History    Not on file   Social Needs    Financial resource strain: Not on file    Food insecurity     Worry: Not on file     Inability: Not on file    Transportation needs     Medical: Not on file     Non-medical: Not on file   Tobacco Use    Smoking status: Current Some Day Smoker     Packs/day: 0.25     Years: 20.00     Pack years: 5.00     Types: Cigarettes     Start date: 1980    Smokeless tobacco: Former User    Tobacco comment: used an electronic cig/smokes marijuana   Substance and Sexual Activity    Alcohol use: No    Drug use: No     Types: Marijuana    Sexual activity: Not on file   Lifestyle    Physical activity     Days per week: Not on file     Minutes per session: Not on file    Stress: Not on file   Relationships    Social connections     Talks on phone: Not on file     Gets together: Not on file     Attends Mormonism service: Not on file     Active member of club or organization: Not on file     Attends meetings of clubs or organizations: Not on file     Relationship status: Not on file    Intimate partner violence     Fear of current or ex partner: Not on file Emotionally abused: Not on file     Physically abused: Not on file     Forced sexual activity: Not on file   Other Topics Concern    Not on file   Social History Narrative    Not on file     Past Medical History:   Diagnosis Date    Anxiety     Depression     Hyperlipidemia     Muscular dystrophy ([address]     Urinary incontinence      Past Surgical History:   Procedure Laterality Date    APPENDECTOMY      CATARACT REMOVAL       SECTION      CHOLECYSTECTOMY      COLONOSCOPY  2017    Dr. Mimi Slade      mariela in right leg    NECK SURGERY      extra ligament    TYMPANOSTOMY TUBE PLACEMENT           REVIEW OF SYSTEMS:  all other systems reviewed and are negative  General Health: no change in health status since last visit  Ears: ear pain No Left recent drainage No  Left  Hearing: no change Left       Comments:     PHYSICAL EXAM:    /72   Ht 5' 5\" (1.651 m)   Wt 214 lb (97.1 kg)   LMP  (LMP Unknown)   BMI 35.61 kg/m²   Body mass index is 35.61 kg/m². General Appearance: well developed , well nourished, no distress and wheel chair  Head/ Face: normocephalic and atraumatic  Vocal Quality: good/ normal  Ears: Right Ear: External: external ears normal Otoscopy Ear Canal: canal clear Otoscopy TM: TM's normal Left Ear: External: external ears normal Otoscopy Ear Canal: canal clear Otoscopy TM: ear tubes:  patent dry good position and Gelfoam overlying tube  Hearing: see audiogram  Psych/ Mood: cooperative and no depression, anxiety or agitation    Assessment & Plan:    Problem List Items Addressed This Visit        ENT Problems    Asymmetrical hearing loss, left     Patient does not perceive any hearing improvement following steroid injection. Today's audiogram suggested may possibly some improvement in the lower frequencies but overall the hearing levels are relatively stable.   See no point in repeating injection based on nonresponse            Other Status post myringotomy with insertion of tube     Tube still in position. Gelfoam overlying tube. No orders of the defined types were placed in this encounter. No orders of the defined types were placed in this encounter. Please note that this chart was generated using dragon dictation software. Although every effort was made to ensure the accuracy of this automated transcription, some errors in transcription may have occurred.

## 2021-01-18 NOTE — ASSESSMENT & PLAN NOTE
Patient does not perceive any hearing improvement following steroid injection. Today's audiogram suggested may possibly some improvement in the lower frequencies but overall the hearing levels are relatively stable.   See no point in repeating injection based on nonresponse

## 2021-01-18 NOTE — PROGRESS NOTES
History   Chelle Ovalle is a 48 y.o. female who presented to the clinic this date for a recheck of hearing following left IT steroid injection. She denied changes in hearing since procedure. Summary   Pure tone testing indicates mild SNHL right and mild to severe SNHL left. When compared to previous audiograms, thresholds remained essentially stable bilaterally. Results   Otoscopy:   ? Right: Clear EAC/Normal TM  ? Left: Unremarkable    Audiometry:   ? Right: Mild flat SNHL (previously established)  ? Left: Mild to severe sloping SNHL (previously established)         Plan   Results of today's testing were discussed with Ms. Samson and the following recommendations were made:    1. Follow up with ENT as scheduled. 2. Monitor hearing yearly, sooner with changes. 3. Hearing aid evaluation as desired. 4. Hearing protection as warranted.         Audiogram and Acoustic Immittance

## 2021-03-23 DIAGNOSIS — R15.9 INCONTINENCE OF FECES, UNSPECIFIED FECAL INCONTINENCE TYPE: ICD-10-CM

## 2021-03-23 DIAGNOSIS — N39.42 URINARY INCONTINENCE WITHOUT SENSORY AWARENESS: ICD-10-CM

## 2021-03-23 DIAGNOSIS — G71.00 MUSCULAR DYSTROPHY (HCC): ICD-10-CM

## 2021-03-23 RX ORDER — KETOTIFEN FUMARATE 0.025 %
DROPS OPHTHALMIC (EYE)
Qty: 30 BOTTLE | Refills: 11 | Status: SHIPPED | OUTPATIENT
Start: 2021-03-23 | End: 2021-07-13

## 2021-03-23 RX ORDER — KETOTIFEN FUMARATE 0.025 %
DROPS OPHTHALMIC (EYE)
Qty: 30 BOTTLE | Refills: 5 | Status: SHIPPED | OUTPATIENT
Start: 2021-03-23 | End: 2021-03-23 | Stop reason: SDUPTHER

## 2021-03-23 NOTE — TELEPHONE ENCOUNTER
Francisco Margarito called to request a refill on her medication.       Last office visit : 9/22/2020   Next office visit : 3/25/2021     Requested Prescriptions     Pending Prescriptions Disp Refills    Nutritional Supplements (HIGH-PROTEIN NUTRITIONAL SHAKE) LIQD 30 Bottle 5     Sig: Take one daily    Diapers & Supplies MISC 120 each 11     Sig: XL pullups, wipes, bed pads, and gloves            Elli Butterfield MA

## 2021-03-25 ENCOUNTER — OFFICE VISIT (OUTPATIENT)
Dept: PRIMARY CARE CLINIC | Age: 56
End: 2021-03-25
Payer: MEDICAID

## 2021-03-25 VITALS
BODY MASS INDEX: 35 KG/M2 | DIASTOLIC BLOOD PRESSURE: 74 MMHG | OXYGEN SATURATION: 97 % | TEMPERATURE: 97 F | SYSTOLIC BLOOD PRESSURE: 126 MMHG | WEIGHT: 205 LBS | HEART RATE: 62 BPM | RESPIRATION RATE: 16 BRPM | HEIGHT: 64 IN

## 2021-03-25 DIAGNOSIS — Z96.22 RETAINED MYRINGOTOMY TUBE IN LEFT EAR: ICD-10-CM

## 2021-03-25 DIAGNOSIS — G71.00 MUSCULAR DYSTROPHY (HCC): ICD-10-CM

## 2021-03-25 DIAGNOSIS — Z12.31 BREAST CANCER SCREENING BY MAMMOGRAM: ICD-10-CM

## 2021-03-25 DIAGNOSIS — Z12.4 PAP SMEAR FOR CERVICAL CANCER SCREENING: ICD-10-CM

## 2021-03-25 DIAGNOSIS — G71.00 MUSCULAR DYSTROPHY (HCC): Primary | ICD-10-CM

## 2021-03-25 DIAGNOSIS — R00.2 HEART PALPITATIONS: ICD-10-CM

## 2021-03-25 LAB
ALBUMIN SERPL-MCNC: 3.8 G/DL (ref 3.5–5.2)
ALP BLD-CCNC: 103 U/L (ref 35–104)
ALT SERPL-CCNC: 16 U/L (ref 5–33)
ANION GAP SERPL CALCULATED.3IONS-SCNC: 8 MMOL/L (ref 7–19)
AST SERPL-CCNC: 20 U/L (ref 5–32)
BILIRUB SERPL-MCNC: <0.2 MG/DL (ref 0.2–1.2)
BUN BLDV-MCNC: 10 MG/DL (ref 6–20)
CALCIUM SERPL-MCNC: 9.4 MG/DL (ref 8.6–10)
CHLORIDE BLD-SCNC: 108 MMOL/L (ref 98–111)
CHOLESTEROL, TOTAL: 309 MG/DL (ref 160–199)
CO2: 30 MMOL/L (ref 22–29)
CREAT SERPL-MCNC: 0.3 MG/DL (ref 0.5–0.9)
GFR AFRICAN AMERICAN: >59
GFR NON-AFRICAN AMERICAN: >60
GLUCOSE BLD-MCNC: 107 MG/DL (ref 74–109)
HBA1C MFR BLD: 5.6 % (ref 4–6)
HDLC SERPL-MCNC: 47 MG/DL (ref 65–121)
LDL CHOLESTEROL CALCULATED: 240 MG/DL
POTASSIUM SERPL-SCNC: 4.4 MMOL/L (ref 3.5–5)
SODIUM BLD-SCNC: 146 MMOL/L (ref 136–145)
TOTAL PROTEIN: 6.8 G/DL (ref 6.6–8.7)
TRIGL SERPL-MCNC: 108 MG/DL (ref 0–149)

## 2021-03-25 PROCEDURE — 93000 ELECTROCARDIOGRAM COMPLETE: CPT | Performed by: NURSE PRACTITIONER

## 2021-03-25 PROCEDURE — 99215 OFFICE O/P EST HI 40 MIN: CPT | Performed by: NURSE PRACTITIONER

## 2021-03-25 NOTE — PROGRESS NOTES
[address] [facility]  [address] [address]  [address] [address]  Dept: [phone]  Dept Fax: [phone]  Loc: [phone]    Chelle Ovalle is a 55 y.o. female who presents today for her medical conditions/complaints as noted below. Chelle Ovalle is c/o of Annual Exam (seems to be doing well; has been feeling a little more tired recently; she states she sometimes her heart will beat fast a couple of time, then goes away; has had 1 covid vaccine, but not sure which one or when she had it) and Gynecologic Exam        HPI:     HPI   Chief Complaint   Patient presents with    Annual Exam     seems to be doing well; has been feeling a little more tired recently; she states she sometimes her heart will beat fast a couple of time, then goes away; has had 1 covid vaccine, but not sure which one or when she had it    Gynecologic Exam     Patient presents today for annual physical. She reports she has had hysterectomy but does not recall if cervix is present. She has history of \"cancer\" but cannot recall what kind and this is why she had hysterectomy. She complains of occasional heart palpitations. She states this occurs after she has gotten up and then sits back down. It does not occur while resting, eating or wake he up at night night. She states it is not painful and does not radiate to arm or jaw. Blood pressure has been stable. She states she has a family history of cardiac disease. Patient also complains of left ear pain. She recently had PE tube placed and states \"it feels sideways\". She states hearing has decreased. There has been no drainage from tube that she is aware of.      Past Medical History:   Diagnosis Date    Anxiety     Depression     Hyperlipidemia     Muscular dystrophy ([address]     Urinary incontinence       Past Surgical History:   Procedure Laterality Date    APPENDECTOMY      CATARACT REMOVAL       SECTION      CHOLECYSTECTOMY      COLONOSCOPY  07/2017    Dr. Kayley sierra in right leg    NECK SURGERY      extra ligament    TYMPANOSTOMY TUBE PLACEMENT         Vitals 3/25/2021 1/18/2021 12/16/2020 12/8/2020 11/24/2020 08/71/3478   SYSTOLIC 535 817 529 620 - 375   DIASTOLIC 74 72 82 78 - 68   Site - - - - - -   Position - - - - - -   Pulse 62 - - - - -   Temp 97 - - - - 98.1   Resp 16 - - - - -   SpO2 97 - - - - -   Weight 205 lb 214 lb 214 lb 214 lb 214 lb 214 lb   Height 5' 4\" 5' 5\" 5' 5\" 5' 5\" 5' 5\" 5' 5\"   Body mass index 35.18 kg/m2 35.61 kg/m2 35.61 kg/m2 35.61 kg/m2 35.61 kg/m2 35.61 kg/m2   Pain Level - - - - - -   Some recent data might be hidden       Family History   Problem Relation Age of Onset    Heart Disease Mother     Diabetes Mother     Heart Disease Father     Diabetes Father     Cancer Maternal Grandmother     Heart Disease Paternal Grandfather     Heart Disease Sister        Social History     Tobacco Use    Smoking status: Current Some Day Smoker     Packs/day: 0.25     Years: 20.00     Pack years: 5.00     Types: Cigarettes     Start date: 1980    Smokeless tobacco: Former User    Tobacco comment: used an electronic cig/smokes marijuana   Substance Use Topics    Alcohol use: No      Current Outpatient Medications   Medication Sig Dispense Refill    Diphenoxylate-Atropine (LOMOTIL PO) Take by mouth      Multiple Vitamins-Minerals (MULTIVITAMIN ADULTS 50+ PO) Take by mouth      Diapers & Supplies MISC XL pullups, wipes, bed pads, and gloves 120 each 11    Nutritional Supplements (HIGH-PROTEIN NUTRITIONAL SHAKE) LIQD Take one daily 30 Bottle 11    ondansetron (ZOFRAN) 4 MG tablet Take 1 tablet by mouth 3 times daily as needed for Nausea or Vomiting 10 tablet 0    nystatin (MYCOSTATIN) 684540 UNIT/GM powder Apply 3 times daily under pt apron 1 Bottle 1    polyethylene glycol (GLYCOLAX) powder Take 17 g by mouth daily      Cholecalciferol (VITAMIN D3) 3000 units TABS Take by mouth      guaiFENesin (MUCINEX) 600 MG extended release tablet Take 1,200 mg by mouth 2 times daily      rosuvastatin (CRESTOR) 10 MG tablet Take 1 tablet by mouth nightly 30 tablet 5     No current facility-administered medications for this visit. Allergies   Allergen Reactions    Erythromycin      Eye antibiotic ointment-reaction to the eyes swollen-daughter mentioned and is swollen noted.  Neomycin-Bacitracin Zn-Polymyx Hives     AND ITCHING    Neosporin [Bacitracin-Neomycin-Polymyxin]     Oxycodone-Acetaminophen Itching    Percocet [Oxycodone-Acetaminophen] Itching    Percodan [Oxycodone-Aspirin] Itching    Tramadol Itching    Food Diarrhea and Nausea And Vomiting     Allergic to all types of peppers       Health Maintenance   Topic Date Due    Hepatitis C screen  Never done    HIV screen  Never done    COVID-19 Vaccine (1) Never done    Shingles Vaccine (1 of 2) Never done    Breast cancer screen  05/20/2020    DTaP/Tdap/Td vaccine (1 - Tdap) 08/05/2021 (Originally 3/12/1984)    Pneumococcal 0-64 years Vaccine (1 of 1 - PPSV23) 06/18/2025 (Originally 3/12/1971)    Lipid screen  03/25/2022    Colon cancer screen colonoscopy  07/18/2022    Cervical cancer screen  03/25/2024    Diabetes screen  03/25/2024    Flu vaccine  Completed    Hepatitis A vaccine  Aged Out    Hepatitis B vaccine  Aged Out    Hib vaccine  Aged Out    Meningococcal (ACWY) vaccine  Aged Out       Subjective:      Review of Systems   Constitutional: Negative for chills and fever. HENT: Positive for ear pain. Negative for hearing loss, rhinorrhea and voice change. Eyes: Negative for photophobia and visual disturbance. Respiratory: Negative for cough and shortness of breath. Cardiovascular: Negative for chest pain and palpitations. Gastrointestinal: Negative for nausea and vomiting. Endocrine: Negative. Negative for cold intolerance and heat intolerance.    Genitourinary: Negative for difficulty urinating and flank pain. Musculoskeletal: Negative for back pain and neck pain. Skin: Negative for color change and rash. Allergic/Immunologic: Negative for environmental allergies and food allergies. Neurological: Negative for dizziness, speech difficulty and headaches. Hematological: Does not bruise/bleed easily. Psychiatric/Behavioral: Negative for sleep disturbance and suicidal ideas. Objective:     Physical Exam  Vitals signs and nursing note reviewed. Constitutional:       Appearance: She is well-developed. HENT:      Head: Atraumatic. Right Ear: External ear normal.      Left Ear: External ear normal. A PE tube is present. Nose: Nose normal.   Eyes:      Conjunctiva/sclera: Conjunctivae normal.      Pupils: Pupils are equal, round, and reactive to light. Neck:      Musculoskeletal: Normal range of motion and neck supple. Cardiovascular:      Rate and Rhythm: Normal rate and regular rhythm. Heart sounds: Normal heart sounds, S1 normal and S2 normal.   Pulmonary:      Effort: Pulmonary effort is normal.      Breath sounds: Normal breath sounds. Abdominal:      General: Bowel sounds are normal.      Palpations: Abdomen is soft. Musculoskeletal: Normal range of motion. Skin:     General: Skin is warm and dry. Neurological:      Mental Status: She is alert and oriented to person, place, and time. Psychiatric:         Behavior: Behavior normal.       /74   Pulse 62   Temp 97 °F (36.1 °C) (Temporal)   Resp 16   Ht 5' 4\" (1.626 m)   Wt 205 lb (93 kg)   LMP  (LMP Unknown)   SpO2 97%   BMI 35.19 kg/m²     Assessment:       Diagnosis Orders   1. Muscular dystrophy ([address]  ECHO Complete 2D W Doppler W Color   2. Breast cancer screening by mammogram  RAVEN DIGITAL SCREEN W OR WO CAD BILATERAL   3. Heart palpitations  EKG 12 lead    EKG 12 lead   4. Pap smear for cervical cancer screening  PAP SMEAR   5.  Retained myringotomy tube in left ear Plan:   More than 50% of the time was spent counseling and coordinating care for a total time of 45 min face to face. Due to sensation of tube \"sideways\" will see if ENT will take a look today while she is here. It is difficult to visualize and does appear a steri-strip has been placed over tube and still intact. ECHO ordered today for SOB and chest pain; family history. EKG showed occasional PAC's and bradycardia 50 bpm.     PAP completed today/mammogram ordered. Patient given educational materials -see patient instructions. Discussed use, benefit, and side effects of prescribed medications. All patient questions answered. Pt voiced understanding. Reviewed health maintenance. Instructed to continue currentmedications, diet and exercise. Patient agreed with treatment plan. Follow up as directed. MEDICATIONS:  No orders of the defined types were placed in this encounter. ORDERS:  Orders Placed This Encounter   Procedures    RAVEN DIGITAL SCREEN W OR WO CAD BILATERAL    PAP SMEAR    Human papillomavirus (HPV) DNA probe thin prep high risk    EKG 12 lead    ECHO Complete 2D W Doppler W Color       Follow-up:  No follow-ups on file. PATIENT INSTRUCTIONS:  Patient Instructions   We are committed to providing you with the best care possible. In order to help us achieve these goals please remember to bring all medications, herbal products, and over the counter supplements with you to each visit. If your provider has ordered testing for you, please be sure to follow up with our office if you have not received results within 7 days after the testing took place. *If you receive a survey after visiting one of our offices, please take time to share your experience concerning your physician office visit. These surveys are confidential and no health information about you is shared.   We are eager to improve for you and we are counting on your feedback to help make that happen. Electronically signed by SHANKAR Dan on 3/30/2021 at 4:55 PM    EMR Dragon/transcription disclaimer:  Much of thisencounter note is electronic transcription/translation of spoken language to printed texts. The electronic translation of spoken language may be erroneous, or at times, nonsensical words or phrases may be inadvertentlytranscribed.   Although I have reviewed the note for such errors, some may still exist.

## 2021-03-26 ENCOUNTER — TELEPHONE (OUTPATIENT)
Dept: PRIMARY CARE CLINIC | Age: 56
End: 2021-03-26

## 2021-03-26 DIAGNOSIS — Z12.4 PAP SMEAR FOR CERVICAL CANCER SCREENING: Primary | ICD-10-CM

## 2021-03-26 DIAGNOSIS — E78.49 OTHER HYPERLIPIDEMIA: Primary | ICD-10-CM

## 2021-03-26 RX ORDER — ROSUVASTATIN CALCIUM 10 MG/1
10 TABLET, COATED ORAL NIGHTLY
Qty: 30 TABLET | Refills: 5 | Status: SHIPPED
Start: 2021-03-26 | End: 2021-07-13 | Stop reason: SINTOL

## 2021-03-26 NOTE — TELEPHONE ENCOUNTER
The patient has been notified of this information and all questions answered. Will restart Crestor. RX sent to pharmacy of choice. Lab order placed.

## 2021-03-26 NOTE — TELEPHONE ENCOUNTER
----- Message from SHANKAR Dan sent at 3/25/2021  5:11 PM CDT -----  Patient's cholesterol is very elevated. It looks like she has been prescribed crestor in the past; is there a reason she is no longer taking this? I would like her to resume this medication and recheck labs in 6 months.

## 2021-03-30 ASSESSMENT — ENCOUNTER SYMPTOMS
BACK PAIN: 0
SHORTNESS OF BREATH: 0
PHOTOPHOBIA: 0
VOMITING: 0
COLOR CHANGE: 0
RHINORRHEA: 0
COUGH: 0
VOICE CHANGE: 0
NAUSEA: 0

## 2021-03-31 LAB
HPV COMMENT: NORMAL
HPV TYPE 16: NOT DETECTED
HPV TYPE 18: NOT DETECTED
HPVOH (OTHER TYPES): NOT DETECTED

## 2021-04-01 ENCOUNTER — APPOINTMENT (OUTPATIENT)
Dept: WOMENS IMAGING | Age: 56
End: 2021-04-01
Payer: MEDICAID

## 2021-04-01 ENCOUNTER — HOSPITAL ENCOUNTER (OUTPATIENT)
Dept: NON INVASIVE DIAGNOSTICS | Age: 56
Discharge: HOME | End: 2021-04-01
Payer: MEDICAID

## 2021-04-01 DIAGNOSIS — G71.00 MUSCULAR DYSTROPHY (HCC): ICD-10-CM

## 2021-04-01 LAB
LV EF: 55 %
LVEF MODALITY: NORMAL

## 2021-04-01 PROCEDURE — 93306 TTE W/DOPPLER COMPLETE: CPT

## 2021-04-27 DIAGNOSIS — R15.9 INCONTINENCE OF FECES, UNSPECIFIED FECAL INCONTINENCE TYPE: ICD-10-CM

## 2021-04-27 RX ORDER — DIPHENOXYLATE HYDROCHLORIDE AND ATROPINE SULFATE 2.5; .025 MG/1; MG/1
1 TABLET ORAL 4 TIMES DAILY PRN
Qty: 30 TABLET | Refills: 0 | Status: SHIPPED | OUTPATIENT
Start: 2021-04-27 | End: 2021-04-29 | Stop reason: SDUPTHER

## 2021-04-27 NOTE — TELEPHONE ENCOUNTER
Ashok Abbasi called to request a refill on her medication. Last office visit : 3/25/2021   Next office visit : Visit date not found     Requested Prescriptions     Signed Prescriptions Disp Refills    diphenoxylate-atropine (LOMOTIL) 2.5-0.025 MG per tablet 30 tablet 0     Sig: Take 1 tablet by mouth 4 times daily as needed for Diarrhea for up to 30 days.      Authorizing Provider: Dorcas Pat     Ordering User: Ratna Subramanian MA

## 2021-04-29 RX ORDER — DIPHENOXYLATE HYDROCHLORIDE AND ATROPINE SULFATE 2.5; .025 MG/1; MG/1
1 TABLET ORAL 4 TIMES DAILY PRN
Qty: 30 TABLET | Refills: 0 | Status: SHIPPED | OUTPATIENT
Start: 2021-04-29 | End: 2021-05-04

## 2021-05-04 DIAGNOSIS — R15.9 INCONTINENCE OF FECES, UNSPECIFIED FECAL INCONTINENCE TYPE: ICD-10-CM

## 2021-05-05 RX ORDER — DIPHENOXYLATE HYDROCHLORIDE AND ATROPINE SULFATE 2.5; .025 MG/1; MG/1
TABLET ORAL
Qty: 30 TABLET | Refills: 0 | Status: SHIPPED | OUTPATIENT
Start: 2021-05-05 | End: 2021-06-04

## 2021-05-25 ENCOUNTER — HOSPITAL ENCOUNTER (OUTPATIENT)
Dept: WOMENS IMAGING | Age: 56
Discharge: HOME | End: 2021-05-25
Payer: MEDICAID

## 2021-05-25 DIAGNOSIS — Z12.31 BREAST CANCER SCREENING BY MAMMOGRAM: ICD-10-CM

## 2021-05-25 PROCEDURE — 77067 SCR MAMMO BI INCL CAD: CPT

## 2021-05-28 ENCOUNTER — TELEPHONE (OUTPATIENT)
Dept: PRIMARY CARE CLINIC | Age: 56
End: 2021-05-28

## 2021-05-28 NOTE — TELEPHONE ENCOUNTER
I have attempted without success to contact this patient by phone to discuss results. Left message with results on secure voicemail and to call back with any questions or concerns.

## 2021-05-28 NOTE — TELEPHONE ENCOUNTER
----- Message from SHANKAR Banks sent at 5/26/2021 10:10 AM CDT -----  Normal mammogram. Repeat in 1 year.

## 2021-06-02 RX ORDER — NYSTATIN 100000 [USP'U]/G
POWDER TOPICAL
Qty: 60 G | Refills: 0 | Status: SHIPPED | OUTPATIENT
Start: 2021-06-02 | End: 2021-07-13

## 2021-07-13 ENCOUNTER — PROCEDURE VISIT (OUTPATIENT)
Dept: ENT CLINIC | Age: 56
End: 2021-07-13
Payer: MEDICAID

## 2021-07-13 ENCOUNTER — OFFICE VISIT (OUTPATIENT)
Dept: ENT CLINIC | Age: 56
End: 2021-07-13
Payer: MEDICAID

## 2021-07-13 ENCOUNTER — OFFICE VISIT (OUTPATIENT)
Dept: PRIMARY CARE CLINIC | Age: 56
End: 2021-07-13
Payer: MEDICAID

## 2021-07-13 VITALS
HEART RATE: 75 BPM | WEIGHT: 205.6 LBS | SYSTOLIC BLOOD PRESSURE: 134 MMHG | DIASTOLIC BLOOD PRESSURE: 82 MMHG | OXYGEN SATURATION: 98 % | TEMPERATURE: 97.6 F | BODY MASS INDEX: 35.29 KG/M2

## 2021-07-13 DIAGNOSIS — H91.8X9 ASYMMETRICAL HEARING LOSS: Primary | ICD-10-CM

## 2021-07-13 DIAGNOSIS — G71.00 MUSCULAR DYSTROPHY (HCC): ICD-10-CM

## 2021-07-13 DIAGNOSIS — L30.4 INTERTRIGO: Primary | ICD-10-CM

## 2021-07-13 DIAGNOSIS — L24.9 IRRITANT DERMATITIS: ICD-10-CM

## 2021-07-13 DIAGNOSIS — Z96.22 STATUS POST MYRINGOTOMY WITH INSERTION OF TUBE: ICD-10-CM

## 2021-07-13 PROCEDURE — 99212 OFFICE O/P EST SF 10 MIN: CPT | Performed by: OTOLARYNGOLOGY

## 2021-07-13 PROCEDURE — 92567 TYMPANOMETRY: CPT | Performed by: AUDIOLOGIST

## 2021-07-13 PROCEDURE — 92504 EAR MICROSCOPY EXAMINATION: CPT | Performed by: OTOLARYNGOLOGY

## 2021-07-13 PROCEDURE — 92552 PURE TONE AUDIOMETRY AIR: CPT | Performed by: AUDIOLOGIST

## 2021-07-13 PROCEDURE — 99213 OFFICE O/P EST LOW 20 MIN: CPT | Performed by: FAMILY MEDICINE

## 2021-07-13 RX ORDER — TRIAMCINOLONE ACETONIDE 0.25 MG/G
CREAM TOPICAL
Qty: 1 TUBE | Refills: 0 | Status: SHIPPED | OUTPATIENT
Start: 2021-07-13

## 2021-07-13 ASSESSMENT — ENCOUNTER SYMPTOMS
RHINORRHEA: 0
NAUSEA: 0
VOICE CHANGE: 0
PHOTOPHOBIA: 0
BACK PAIN: 0
VOMITING: 0
SHORTNESS OF BREATH: 0
COUGH: 0

## 2021-07-13 NOTE — PROGRESS NOTES
 Active Member of Clubs or Organizations:     Attends Club or Organization Meetings:     Marital Status:    Intimate Partner Violence:     Fear of Current or Ex-Partner:     Emotionally Abused:     Physically Abused:     Sexually Abused:      Past Medical History:   Diagnosis Date    Anxiety     Depression     Hyperlipidemia     [address] [address]     Urinary incontinence     Uterine cancer ([facility] [address]      Past Surgical History:   Procedure Laterality Date    APPENDECTOMY      CATARACT REMOVAL       SECTION      CHOLECYSTECTOMY      COLONOSCOPY  2017    Dr. James Iverson      mariela in right leg    NECK SURGERY      extra ligament    OVARY REMOVAL      TYMPANOSTOMY TUBE PLACEMENT           REVIEW OF SYSTEMS:  all other systems reviewed and are negative  General Health: no change in health status since last visit  Ears: ear pain No Left recent infection No  Left recent drainage No  Left  Hearing: no change Left       Comments:     PHYSICAL EXAM:    LMP  (LMP Unknown)   There is no height or weight on file to calculate BMI. General Appearance: well developed , well nourished and no distress  Head/ Face: normocephalic and atraumatic  Vocal Quality: good/ normal  Ears: Right Ear: External: external ears normal Otoscopy Ear Canal: canal clear Otoscopy TM: TM's normal, TM's mobile and TM's intact Left Ear: External: external ears normal Otoscopy Ear Canal: foreign body Otoscopy TM: ear tubes:  patent dry good position  Hearing: see audiogram  Neuro: alert and oriented x3 and cranial nerves II- XII grossly intact  Psych/ Mood: cooperative and no depression, anxiety or agitation    Assessment & Plan:    Problem List Items Addressed This Visit     Status post myringotomy with insertion of tube     Left tympanostomy tube still in position patent and functioning               No orders of the defined types were placed in this encounter.       No orders of the defined types were placed in this encounter. Please note that this chart was generated using dragon dictation software. Although every effort was made to ensure the accuracy of this automated transcription, some errors in transcription may have occurred.

## 2021-07-13 NOTE — PROGRESS NOTES
Cystocele    Urinary incontinence    Chronic foot pain    Reactive depression    Superficial injury of eyelid with infection    Hypokalemia    Encephalopathy, metabolic    Acute respiratory failure with hypoxia and hypercapnia (HCC)    Acquired ptosis of both eyelids    History of plastic surgery    Blurring of visual image of both eyes    Pneumonia due to organism    Chronic serous otitis media of left ear    Status post myringotomy with insertion of tube    Otalgia, left    Impacted cerumen of right ear    Asymmetrical hearing loss, left       PSHx:  Past Surgical History:   Procedure Laterality Date    APPENDECTOMY      CATARACT REMOVAL       SECTION      CHOLECYSTECTOMY      COLONOSCOPY  2017    Dr. Shyann sierra in right leg    NECK SURGERY      extra ligament    OVARY REMOVAL      TYMPANOSTOMY TUBE PLACEMENT         PFHx:  Family History   Problem Relation Age of Onset    Heart Disease Mother     Diabetes Mother     Heart Disease Father     Diabetes Father     Cancer Maternal Grandmother     Heart Disease Paternal Grandfather     Heart Disease Sister     Breast Cancer Maternal Aunt 39       SocialHx:  Social History     Tobacco Use    Smoking status: Current Some Day Smoker     Packs/day: 0.25     Years: 20.00     Pack years: 5.00     Types: Cigarettes     Start date:     Smokeless tobacco: Former User    Tobacco comment: used an electronic cig/smokes marijuana   Substance Use Topics    Alcohol use: No       Allergies: Allergies   Allergen Reactions    Erythromycin      Eye antibiotic ointment-reaction to the eyes swollen-daughter mentioned and is swollen noted.     Neomycin-Bacitracin Zn-Polymyx Hives     AND ITCHING    Neosporin [Bacitracin-Neomycin-Polymyxin]     Oxycodone-Acetaminophen Itching    Percocet [Oxycodone-Acetaminophen] Itching    Percodan [Oxycodone-Aspirin] Itching    Tramadol Itching    Food Diarrhea and Nausea And Vomiting     Allergic to all types of peppers       Medications:  Current Outpatient Medications   Medication Sig Dispense Refill    triamcinolone (KENALOG) 0.025 % cream Apply a  thin layer to affected area topically 2 times daily. 1 Tube 0    nystatin (NYSTATIN) 558918 UNIT/GM powder APPLY THREE TIMES DAILY AS DIRECTED. (Patient not taking: Reported on 7/13/2021) 60 g 0    rosuvastatin (CRESTOR) 10 MG tablet Take 1 tablet by mouth nightly (Patient not taking: Reported on 7/13/2021) 30 tablet 5    Multiple Vitamins-Minerals (MULTIVITAMIN ADULTS 50+ PO) Take by mouth (Patient not taking: Reported on 7/13/2021)      Diapers & Supplies MISC XL pullups, wipes, bed pads, and gloves (Patient not taking: Reported on 7/13/2021) 120 each 11    Nutritional Supplements (HIGH-PROTEIN NUTRITIONAL SHAKE) LIQD Take one daily (Patient not taking: Reported on 7/13/2021) 30 Bottle 11    ondansetron (ZOFRAN) 4 MG tablet Take 1 tablet by mouth 3 times daily as needed for Nausea or Vomiting (Patient not taking: Reported on 7/13/2021) 10 tablet 0    polyethylene glycol (GLYCOLAX) powder Take 17 g by mouth daily (Patient not taking: Reported on 7/13/2021)      Cholecalciferol (VITAMIN D3) 3000 units TABS Take by mouth (Patient not taking: Reported on 7/13/2021)      guaiFENesin (MUCINEX) 600 MG extended release tablet Take 1,200 mg by mouth 2 times daily (Patient not taking: Reported on 7/13/2021)       No current facility-administered medications for this visit. Objective:   PE:  /82   Pulse 75   Temp 97.6 °F (36.4 °C)   Wt 205 lb 9.6 oz (93.3 kg)   LMP  (LMP Unknown)   SpO2 98%   BMI 35.29 kg/m²   Physical Exam  Vitals reviewed. Exam conducted with a chaperone present (caregiver). HENT:      Head: Normocephalic. Cardiovascular:      Rate and Rhythm: Normal rate and regular rhythm. Heart sounds: Normal heart sounds. Pulmonary:      Breath sounds: Normal breath sounds. Abdominal:      Palpations: Abdomen is soft. Tenderness: There is no abdominal tenderness. Genitourinary:     General: Normal vulva. Exam position: Lithotomy position. Comments: erythematous rah in the L inguinal area, no satellite lesions  Skin:     General: Skin is warm. Neurological:      Mental Status: She is alert. Assessment & Plan   Jeremy Galindo was seen today for mass and other. Diagnoses and all orders for this visit:    Intertrigo  -     triamcinolone (KENALOG) 0.025 % cream; Apply a  thin layer to affected area topically 2 times daily. Irritant dermatitis    Muscular dystrophy ([address]      Keep perineal area clean and dry. Use cool setting of hair dryer to dry in between undergarment changes  Continue all maintenance medications  Okay to use Gold Bond powder prn    Return in 3 months (on 10/13/2021) for routine follow-up, chronic care management. All questions were answered. Medications, including possible adverse effects, and instructions were reviewed and  understanding was confirmed. Follow-up recommendations, including when to contact or return to office (ie; if symptoms worsen or fail to improve), were discussed and acknowledged.     Electronically signed by Librado Pichardo MD on 7/13/21 at 3:37 PM CDT

## 2021-11-18 ENCOUNTER — VIRTUAL VISIT (OUTPATIENT)
Dept: PRIMARY CARE CLINIC | Age: 56
End: 2021-11-18
Payer: MEDICAID

## 2021-11-18 DIAGNOSIS — J01.90 ACUTE NON-RECURRENT SINUSITIS, UNSPECIFIED LOCATION: Primary | ICD-10-CM

## 2021-11-18 DIAGNOSIS — R19.7 DIARRHEA, UNSPECIFIED TYPE: ICD-10-CM

## 2021-11-18 PROCEDURE — 99213 OFFICE O/P EST LOW 20 MIN: CPT | Performed by: NURSE PRACTITIONER

## 2021-11-18 RX ORDER — AMOXICILLIN AND CLAVULANATE POTASSIUM 875; 125 MG/1; MG/1
1 TABLET, FILM COATED ORAL 2 TIMES DAILY
Qty: 20 TABLET | Refills: 0 | Status: SHIPPED | OUTPATIENT
Start: 2021-11-18 | End: 2022-05-18

## 2021-11-18 ASSESSMENT — ENCOUNTER SYMPTOMS
BACK PAIN: 0
VOMITING: 0
DIARRHEA: 1
COUGH: 1
NAUSEA: 0
COLOR CHANGE: 0
PHOTOPHOBIA: 0
RHINORRHEA: 1
SHORTNESS OF BREATH: 0
VOICE CHANGE: 0
SORE THROAT: 1

## 2021-11-18 NOTE — PROGRESS NOTES
[address] [address]             Phone:  [phone]  Fax:  [phone]       2021    TELEHEALTH EVALUATION -- Audio/Visual (During WXSUV-37 public health emergency)    HPI:  Chief Complaint   Patient presents with    Pharyngitis    Diarrhea         Mily Kid (:  1965) has requested an audio/video evaluation for the following concern(s):    Patient presents today for evaluation of sore throat, chills, diarrhea, cough and congestion. She has been afebrile. She reports symptoms began 4-5 days ago. She has had covid vaccine but not the booster yet. She has not taken any OTC medications. She denies shortness of breath. Review of Systems   Constitutional: Positive for chills. Negative for fever. HENT: Positive for congestion, rhinorrhea and sore throat. Negative for ear pain, hearing loss and voice change. Eyes: Negative for photophobia and visual disturbance. Respiratory: Positive for cough. Negative for shortness of breath. Cardiovascular: Negative for chest pain and palpitations. Gastrointestinal: Positive for diarrhea. Negative for nausea and vomiting. Endocrine: Negative. Negative for cold intolerance and heat intolerance. Genitourinary: Negative for difficulty urinating and flank pain. Musculoskeletal: Negative for back pain and neck pain. Skin: Negative for color change and rash. Allergic/Immunologic: Negative for environmental allergies and food allergies. Neurological: Negative for dizziness, speech difficulty and headaches. Hematological: Does not bruise/bleed easily. Psychiatric/Behavioral: Negative for sleep disturbance and suicidal ideas. Prior to Visit Medications    Medication Sig Taking?  Authorizing Provider   amoxicillin-clavulanate (AUGMENTIN) 875-125 MG per tablet Take 1 tablet by mouth 2 times daily Yes SHANKAR Valladares   triamcinolone (KENALOG) 0.025 % cream Apply a  thin layer to affected area topically 2 times daily. Laina Albright MD       Social History     Tobacco Use    Smoking status: Current Some Day Smoker     Packs/day: 0.25     Years: 20.00     Pack years: 5.00     Types: Cigarettes     Start date: 37    Smokeless tobacco: Former User    Tobacco comment: used an electronic cig/smokes marijuana   Vaping Use    Vaping Use: Never used   Substance Use Topics    Alcohol use: No    Drug use: No     Types: Marijuana (Weed)        Allergies   Allergen Reactions    Erythromycin      Eye antibiotic ointment-reaction to the eyes swollen-daughter mentioned and is swollen noted.     Neomycin-Bacitracin Zn-Polymyx Hives     AND ITCHING    Neosporin [Bacitracin-Neomycin-Polymyxin]     Oxycodone-Acetaminophen Itching    Percocet [Oxycodone-Acetaminophen] Itching    Percodan [Oxycodone-Aspirin] Itching    Tramadol Itching    Food Diarrhea and Nausea And Vomiting     Allergic to all types of peppers   ,   Past Medical History:   Diagnosis Date    Anxiety     Depression     Hyperlipidemia     [facility] [address]     Urinary incontinence     Uterine cancer ([facility] [address]    ,   Past Surgical History:   Procedure Laterality Date    APPENDECTOMY      CATARACT REMOVAL       SECTION      CHOLECYSTECTOMY      COLONOSCOPY  2017    Dr. Kristin Goodrich      mariela in right leg    NECK SURGERY      extra ligament    OVARY REMOVAL      TYMPANOSTOMY TUBE PLACEMENT     ,   Social History     Tobacco Use    Smoking status: Current Some Day Smoker     Packs/day: 0.25     Years: 20.00     Pack years: 5.00     Types: Cigarettes     Start date: 37    Smokeless tobacco: Former User    Tobacco comment: used an electronic cig/smokes marijuana   Vaping Use    Vaping Use: Never used   Substance Use Topics    Alcohol use: No    Drug use: No     Types: Marijuana (Weed)   ,   Family History   Problem Relation Age of Onset    Heart Disease Mother     Diabetes Mother     Heart Disease Father     Diabetes Father     Cancer Maternal Grandmother     Heart Disease Paternal Grandfather     Heart Disease Sister     Breast Cancer Maternal Aunt 39   ,   Immunization History   Administered Date(s) Administered    COVID-19, Carolyn Guerrero, Primary or Immunocompromised, PF, 100mcg/0.5mL 03/02/2021, 03/30/2021    Influenza, Quadv, IM, PF (6 mo and older Fluzone, Flulaval, Fluarix, and 3 yrs and older Afluria) 09/22/2020   ,   Health Maintenance   Topic Date Due    Hepatitis C screen  Never done    HIV screen  Never done    DTaP/Tdap/Td vaccine (1 - Tdap) Never done    Shingles Vaccine (1 of 2) Never done    Flu vaccine (1) 09/01/2021    COVID-19 Vaccine (3 - Booster for Moderna series) 09/30/2021    Pneumococcal 0-64 years Vaccine (1 of 2 - PPSV23) 06/18/2025 (Originally 3/12/1971)    Breast cancer screen  05/25/2022    Colon cancer screen colonoscopy  07/18/2022    Diabetes screen  03/25/2024    Lipid screen  03/25/2026    Hepatitis A vaccine  Aged Out    Hepatitis B vaccine  Aged Out    Hib vaccine  Aged Out    Meningococcal (ACWY) vaccine  Aged Out       PHYSICAL EXAMINATION:  [ INSTRUCTIONS:  \"[x]\" Indicates a positive item  \"[]\" Indicates a negative item  -- DELETE ALL ITEMS NOT EXAMINED]  [x] Alert  [x] Oriented to person/place/time    [x] No apparent distress  [] Toxic appearing    [] Face flushed appearing [x] Sclera clear  [] Lips are cyanotic      [x] Breathing appears normal  [] Appears tachypneic      [] Rash on visible skin    [x] Cranial Nerves II-XII grossly intact    [x] Motor grossly intact in visible upper extremities    [x] Motor grossly intact in visible lower extremities    [x] Normal Mood  [] Anxious appearing    [] Depressed appearing  [] Confused appearing      [] Poor short term memory  [] Poor long term memory    [] OTHER:      Due to this being a TeleHealth encounter, evaluation of the following organ systems is limited: Vitals/Constitutional/EENT/Resp/CV/GI//MS/Neuro/Skin/Heme-Lymph-Imm. LMP  (LMP Unknown)      No flowsheet data found. ASSESSMENT/PLAN:  1. Acute non-recurrent sinusitis, unspecified location  - Discussed with patient that given her symptoms she should get covid tested; she states she would like to go to the health dept for this. Will treat with Augmentin for sinusitis as well. - amoxicillin-clavulanate (AUGMENTIN) 875-125 MG per tablet; Take 1 tablet by mouth 2 times daily  Dispense: 20 tablet; Refill: 0    2. Diarrhea, unspecified type  - Increase fluids. May use pepto as needed. Probiotics/yogurt recommended. Follow-up if not improving. No follow-ups on file. An  electronic signature was used to authenticate this note. --SHANKAR Blair on 11/18/2021 at 12:58 PM        Pursuant to the emergency declaration under the [place], [facility] waiver authority and the [employer] and [employer] Act, this Virtual  Visit was conducted, with patient's consent, to reduce the patient's risk of exposure to COVID-19 and provide continuity of care for an established patient. Services were provided through a video synchronous discussion virtually to substitute for in-person clinic visit.

## 2022-01-13 ENCOUNTER — PROCEDURE VISIT (OUTPATIENT)
Dept: ENT CLINIC | Age: 57
End: 2022-01-13
Payer: MEDICAID

## 2022-01-13 ENCOUNTER — OFFICE VISIT (OUTPATIENT)
Dept: ENT CLINIC | Age: 57
End: 2022-01-13
Payer: MEDICAID

## 2022-01-13 VITALS
TEMPERATURE: 97.2 F | HEART RATE: 88 BPM | OXYGEN SATURATION: 96 % | DIASTOLIC BLOOD PRESSURE: 76 MMHG | BODY MASS INDEX: 33.97 KG/M2 | WEIGHT: 199 LBS | HEIGHT: 64 IN | SYSTOLIC BLOOD PRESSURE: 118 MMHG

## 2022-01-13 DIAGNOSIS — H65.22 CHRONIC SEROUS OTITIS MEDIA OF LEFT EAR: ICD-10-CM

## 2022-01-13 DIAGNOSIS — H92.02 OTALGIA, LEFT: ICD-10-CM

## 2022-01-13 DIAGNOSIS — H92.02 LEFT EAR PAIN: Primary | ICD-10-CM

## 2022-01-13 DIAGNOSIS — G71.00 MUSCULAR DYSTROPHY (HCC): Primary | ICD-10-CM

## 2022-01-13 DIAGNOSIS — Z96.22 STATUS POST MYRINGOTOMY WITH INSERTION OF TUBE: ICD-10-CM

## 2022-01-13 DIAGNOSIS — H69.82 DYSFUNCTION OF LEFT EUSTACHIAN TUBE: ICD-10-CM

## 2022-01-13 PROCEDURE — 99212 OFFICE O/P EST SF 10 MIN: CPT | Performed by: OTOLARYNGOLOGY

## 2022-01-13 PROCEDURE — 92567 TYMPANOMETRY: CPT | Performed by: AUDIOLOGIST

## 2022-01-13 NOTE — PROGRESS NOTES
Jewel Horton presented to the clinic this date with complaints of left ear pain. She has history of left PE tube placement. Tube has extruded. Type A tympanogram right consistent with normal TM mobility. Type C tympanogram left consistent with negative middle ear pressure. DISPLAY PLAN FREE TEXT

## 2022-01-13 NOTE — ASSESSMENT & PLAN NOTE
Left tube is extruded  TM healed  A bit dull with retraction on tympanogram but no evidence of conductive loss

## 2022-01-13 NOTE — PROGRESS NOTES
55 y.o.  female presents today for follow-up. She has had a left tube placed in the past and returns today for routine reevaluation. She reports occasional left ear discomfort. She did not report any obvious hearing loss or discharge from the ear. Family History   Problem Relation Age of Onset    Heart Disease Mother     Diabetes Mother     Heart Disease Father     Diabetes Father     Cancer Maternal Grandmother     Heart Disease Paternal Grandfather     Heart Disease Sister     Breast Cancer Maternal Aunt 39     Social History     Socioeconomic History    Marital status:      Spouse name: Not on file    Number of children: Not on file    Years of education: Not on file    Highest education level: Not on file   Occupational History    Not on file   Tobacco Use    Smoking status: Current Some Day Smoker     Packs/day: 0.25     Years: 20.00     Pack years: 5.00     Types: Cigarettes     Start date: 1980    Smokeless tobacco: Former User    Tobacco comment: used an electronic cig/smokes marijuana   Vaping Use    Vaping Use: Never used   Substance and Sexual Activity    Alcohol use: No    Drug use: No     Types: Marijuana Fredick Copping)    Sexual activity: Not on file   Other Topics Concern    Not on file   Social History Narrative    Not on file     Social Determinants of Health     Financial Resource Strain:     Difficulty of Paying Living Expenses: Not on file   Food Insecurity:     Worried About [address] in the Last Year: Not on file    [address] in the Last Year: Not on file   Transportation Needs:     Lack of Transportation (Medical): Not on file    Lack of Transportation (Non-Medical):  Not on file   Physical Activity:     Days of Exercise per Week: Not on file    Minutes of Exercise per Session: Not on file   Stress:     Feeling of Stress : Not on file   Social Connections:     Frequency of Communication with Friends and Family: Not on file    Frequency of Social Gatherings with Friends and Family: Not on file    Attends Rastafari Services: Not on file    Active Member of Clubs or Organizations: Not on file    Attends Club or Organization Meetings: Not on file    Marital Status: Not on file   Intimate Partner Violence:     Fear of Current or Ex-Partner: Not on file    Emotionally Abused: Not on file    Physically Abused: Not on file    Sexually Abused: Not on file   Housing Stability:     Unable to Pay for Housing in the Last Year: Not on file    Number of [place] in the Last Year: Not on file    Unstable Housing in the Last Year: Not on file     Past Medical History:   Diagnosis Date    Anxiety     Depression     Hyperlipidemia     [facility] ([facility] [address]     Urinary incontinence     Uterine cancer ([facility] [address]      Past Surgical History:   Procedure Laterality Date    APPENDECTOMY      CATARACT REMOVAL       SECTION      CHOLECYSTECTOMY      COLONOSCOPY  2017    Dr. Virginie sierra in right leg    NECK SURGERY      extra ligament    OVARY REMOVAL      TYMPANOSTOMY TUBE PLACEMENT           REVIEW OF SYSTEMS:  all other systems reviewed and are negative  General Health: no change in health status since last visit  Ears: ear pain Yes Occasional left ear discomfort recent infection No  Left  Hearing: no change Left       Comments:     PHYSICAL EXAM:    /76   Pulse 88   Temp 97.2 °F (36.2 °C)   Ht 5' 4\" (1.626 m)   Wt 199 lb (90.3 kg)   LMP  (LMP Unknown)   SpO2 96%   BMI 34.16 kg/m²   Body mass index is 34.16 kg/m².     General Appearance: well developed , well nourished and no distress  Head/ Face: normocephalic and atraumatic  Vocal Quality: good/ normal  Ears: Right Ear: External: external ears normal Otoscopy Ear Canal: canal clear Otoscopy TM: TM's normal, TM's mobile and TM's intact Left Ear: External: external ears normal Otoscopy Ear Canal: extruded tube in canal Otoscopy TM: TM's mobile, TM's intact and TM's dull  Hearing: Rinne A>B: Left, Rinne A>B: Right, Lundy M and see audiogram  Oral:lips: normal Oropharynx:normal tongue: normal   Dentition: dentition: poor   Neuro: alert and oriented x3 and cranial nerves II- XII grossly intact  Psych/ Mood: cooperative and no depression, anxiety or agitation    Assessment & Plan:    Problem List Items Addressed This Visit        ENT Problems    Chronic serous otitis media of left ear     Left tube is extruded  TM healed  A bit dull with retraction on tympanogram but no evidence of conductive loss            Other    Status post myringotomy with insertion of tube     Left tube has extruded         Otalgia, left     No evidence of inflammation or infection  Eustachian tube dysfunction  Possibly referred pain secondary to dental disease. Recommended to patient that she make an appointment with her dentist.               No orders of the defined types were placed in this encounter. No orders of the defined types were placed in this encounter. Please note that this chart was generated using dragon dictation software. Although every effort was made to ensure the accuracy of this automated transcription, some errors in transcription may have occurred.

## 2022-04-14 ENCOUNTER — TELEPHONE (OUTPATIENT)
Dept: PRIMARY CARE CLINIC | Age: 57
End: 2022-04-14

## 2022-04-14 DIAGNOSIS — R15.9 INCONTINENCE OF FECES, UNSPECIFIED FECAL INCONTINENCE TYPE: ICD-10-CM

## 2022-04-14 DIAGNOSIS — G71.00 MUSCULAR DYSTROPHY (HCC): ICD-10-CM

## 2022-04-14 DIAGNOSIS — N39.42 URINARY INCONTINENCE WITHOUT SENSORY AWARENESS: ICD-10-CM

## 2022-04-14 NOTE — TELEPHONE ENCOUNTER
Griffin Yang called to request a refill on her medication.       Last office visit : 11/18/2021   Next office visit : Visit date not found     Requested Prescriptions     Signed Prescriptions Disp Refills    Diapers & Supplies MISC 120 each 11     Sig: XL pullups, wipes, bed pads, and gloves     Authorizing Provider: Amalia Cowden     Ordering User: Renny Hernandes MA

## 2022-04-15 RX ORDER — KETOTIFEN FUMARATE 0.025 %
DROPS OPHTHALMIC (EYE)
Qty: 30 EACH | Refills: 11 | Status: SHIPPED | OUTPATIENT
Start: 2022-04-15

## 2022-05-11 ENCOUNTER — TELEPHONE (OUTPATIENT)
Dept: PRIMARY CARE CLINIC | Age: 57
End: 2022-05-11

## 2022-05-11 NOTE — TELEPHONE ENCOUNTER
Patient's caregiver called and stated that they have been trying to get a prescription written for Marla's protein shakes. Please contact Patient or Caregiver, [phone] for more information.

## 2022-05-12 ENCOUNTER — TELEPHONE (OUTPATIENT)
Dept: PRIMARY CARE CLINIC | Age: 57
End: 2022-05-12

## 2022-05-18 ENCOUNTER — TELEMEDICINE (OUTPATIENT)
Dept: PRIMARY CARE CLINIC | Age: 57
End: 2022-05-18
Payer: MEDICAID

## 2022-05-18 DIAGNOSIS — G71.00 MUSCULAR DYSTROPHY (HCC): Primary | ICD-10-CM

## 2022-05-18 DIAGNOSIS — N39.42 URINARY INCONTINENCE WITHOUT SENSORY AWARENESS: ICD-10-CM

## 2022-05-18 PROCEDURE — 99213 OFFICE O/P EST LOW 20 MIN: CPT | Performed by: NURSE PRACTITIONER

## 2022-05-18 RX ORDER — ROSUVASTATIN CALCIUM 10 MG/1
10 TABLET, COATED ORAL DAILY
Qty: 30 TABLET | Refills: 1 | Status: SHIPPED | OUTPATIENT
Start: 2022-05-18

## 2022-05-18 SDOH — ECONOMIC STABILITY: FOOD INSECURITY: WITHIN THE PAST 12 MONTHS, YOU WORRIED THAT YOUR FOOD WOULD RUN OUT BEFORE YOU GOT MONEY TO BUY MORE.: NEVER TRUE

## 2022-05-18 SDOH — ECONOMIC STABILITY: FOOD INSECURITY: WITHIN THE PAST 12 MONTHS, THE FOOD YOU BOUGHT JUST DIDN'T LAST AND YOU DIDN'T HAVE MONEY TO GET MORE.: NEVER TRUE

## 2022-05-18 ASSESSMENT — ENCOUNTER SYMPTOMS
BACK PAIN: 0
RHINORRHEA: 0
VOMITING: 0
COUGH: 0
COLOR CHANGE: 0
PHOTOPHOBIA: 0
SHORTNESS OF BREATH: 0
NAUSEA: 0
VOICE CHANGE: 0

## 2022-05-18 ASSESSMENT — PATIENT HEALTH QUESTIONNAIRE - PHQ9
SUM OF ALL RESPONSES TO PHQ QUESTIONS 1-9: 0
6. FEELING BAD ABOUT YOURSELF - OR THAT YOU ARE A FAILURE OR HAVE LET YOURSELF OR YOUR FAMILY DOWN: 0
SUM OF ALL RESPONSES TO PHQ9 QUESTIONS 1 & 2: 0
1. LITTLE INTEREST OR PLEASURE IN DOING THINGS: 0
5. POOR APPETITE OR OVEREATING: 0
3. TROUBLE FALLING OR STAYING ASLEEP: 0
9. THOUGHTS THAT YOU WOULD BE BETTER OFF DEAD, OR OF HURTING YOURSELF: 0
8. MOVING OR SPEAKING SO SLOWLY THAT OTHER PEOPLE COULD HAVE NOTICED. OR THE OPPOSITE, BEING SO FIGETY OR RESTLESS THAT YOU HAVE BEEN MOVING AROUND A LOT MORE THAN USUAL: 0
SUM OF ALL RESPONSES TO PHQ QUESTIONS 1-9: 0
4. FEELING TIRED OR HAVING LITTLE ENERGY: 0
10. IF YOU CHECKED OFF ANY PROBLEMS, HOW DIFFICULT HAVE THESE PROBLEMS MADE IT FOR YOU TO DO YOUR WORK, TAKE CARE OF THINGS AT HOME, OR GET ALONG WITH OTHER PEOPLE: 0
2. FEELING DOWN, DEPRESSED OR HOPELESS: 0
SUM OF ALL RESPONSES TO PHQ QUESTIONS 1-9: 0
7. TROUBLE CONCENTRATING ON THINGS, SUCH AS READING THE NEWSPAPER OR WATCHING TELEVISION: 0
SUM OF ALL RESPONSES TO PHQ QUESTIONS 1-9: 0

## 2022-05-18 ASSESSMENT — SOCIAL DETERMINANTS OF HEALTH (SDOH): HOW HARD IS IT FOR YOU TO PAY FOR THE VERY BASICS LIKE FOOD, HOUSING, MEDICAL CARE, AND HEATING?: NOT HARD AT ALL

## 2022-05-18 NOTE — PROGRESS NOTES
[address] [place] [address]             Phone:  [phone]  Fax:  [phone]       2022    TELEHEALTH EVALUATION -- Audio/Visual (During RXWJC-12 public health emergency)    HPI:  Chief Complaint   Patient presents with    Incontinence     xl pullups, boost protein shakes         Harper Jeffries (:  1965) has requested an audio/video evaluation for the following concern(s):    Patient presents today for evaluation of incontinence. She states she has been trying to get adult diapers and protein shakes paid for by her insurance. Today she does not have the information needed to fax these orders. They were given to her last month, however. Patient has also not been in seen in office for physical in over a year. Discussed with patient that she must get labs and be seen before any further refills are to be given. She has not been taking rosuvastatin as directed; her last lipids > 1 year ago were very elevated. Lab Results   Component Value Date    CHOL 309 (H) 2021    CHOL 326 (H) 2017     Lab Results   Component Value Date    TRIG 108 2021    TRIG 154 2017     Lab Results   Component Value Date    HDL 47 (L) 2021    HDL 48 (L) 2017     Lab Results   Component Value Date    LDLCALC 240 2021    [address] 2017     No results found for: LABVLDL, VLDL  No results found for: CHOLHDLRATIO      Review of Systems   Constitutional: Negative for chills and fever. HENT: Negative for ear pain, hearing loss, rhinorrhea and voice change. Eyes: Negative for photophobia and visual disturbance. Respiratory: Negative for cough and shortness of breath. Cardiovascular: Negative for chest pain and palpitations. Gastrointestinal: Negative for nausea and vomiting. Endocrine: Negative. Negative for cold intolerance and heat intolerance. Genitourinary: Negative for difficulty urinating and flank pain.    Musculoskeletal: Negative for back pain and neck pain. Skin: Negative for color change and rash. Allergic/Immunologic: Negative for environmental allergies and food allergies. Neurological: Negative for dizziness, speech difficulty and headaches. Hematological: Does not bruise/bleed easily. Psychiatric/Behavioral: Negative for sleep disturbance and suicidal ideas. Prior to Visit Medications    Medication Sig Taking? Authorizing Provider   rosuvastatin (CRESTOR) 10 MG tablet Take 1 tablet by mouth daily Yes SHANKAR oN   Diapers & Supplies MISC XL pullups, wipes, bed pads, and gloves Yes SHANKAR No   Nutritional Supplements (HIGH-PROTEIN NUTRITIONAL SHAKE) LIQD Take one daily Yes SHANKAR No   triamcinolone (KENALOG) 0.025 % cream Apply a  thin layer to affected area topically 2 times daily. Patient not taking: Reported on 1/13/2022  Mika Antonio MD       Social History     Tobacco Use    Smoking status: Current Some Day Smoker     Packs/day: 0.25     Years: 20.00     Pack years: 5.00     Types: Cigarettes     Start date: 37    Smokeless tobacco: Former User    Tobacco comment: used an electronic cig/smokes marijuana   Vaping Use    Vaping Use: Never used   Substance Use Topics    Alcohol use: No    Drug use: No     Types: Marijuana (Weed)        Allergies   Allergen Reactions    Erythromycin      Eye antibiotic ointment-reaction to the eyes swollen-daughter mentioned and is swollen noted.     Neomycin-Bacitracin Zn-Polymyx Hives     AND ITCHING    Neosporin [Bacitracin-Neomycin-Polymyxin]     Oxycodone-Acetaminophen Itching    Percocet [Oxycodone-Acetaminophen] Itching    Percodan [Oxycodone-Aspirin] Itching    Tramadol Itching    Food Diarrhea and Nausea And Vomiting     Allergic to all types of peppers   ,   Past Medical History:   Diagnosis Date    Anxiety     Depression     Hyperlipidemia     [facility]cular dyst[facility] ([facility] [address]     Urinary incontinence     Uterine cancer ([facility] [address]    ,   Past Surgical History:   Procedure Laterality Date    APPENDECTOMY      CATARACT REMOVAL       SECTION      CHOLECYSTECTOMY      COLONOSCOPY  2017    Dr. Lyn Braden      mariela in right leg    NECK SURGERY      extra ligament    OVARY REMOVAL      TYMPANOSTOMY TUBE PLACEMENT     ,   Social History     Tobacco Use    Smoking status: Current Some Day Smoker     Packs/day: 0.25     Years: 20.00     Pack years: 5.00     Types: Cigarettes     Start date: 37    Smokeless tobacco: Former User    Tobacco comment: used an electronic cig/smokes marijuana   Vaping Use    Vaping Use: Never used   Substance Use Topics    Alcohol use: No    Drug use: No     Types: Marijuana (Weed)   ,   Family History   Problem Relation Age of Onset    Heart Disease Mother     Diabetes Mother     Heart Disease Father     Diabetes Father     Cancer Maternal Grandmother     Heart Disease Paternal Grandfather     Heart Disease Sister     Breast Cancer Maternal Aunt 39   ,   Immunization History   Administered Date(s) Administered    COVID-19, Ke Deist, Primary or Immunocompromised, PF, 100mcg/0.5mL 2021, 2021    Influenza, Quadv, IM, PF (6 mo and older Fluzone, Flulaval, Fluarix, and 3 yrs and older Afluria) 2020   ,   Health Maintenance   Topic Date Due    HIV screen  Never done    Hepatitis C screen  Never done    DTaP/Tdap/Td vaccine (1 - Tdap) Never done    Shingles vaccine (1 of 2) Never done    COVID-19 Vaccine (3 - Booster for Moderna series) 2021    Breast cancer screen  2022    Pneumococcal 0-64 years Vaccine (1 - PCV) 2025 (Originally 3/12/1972)    Colorectal Cancer Screen  2022    Flu vaccine (Season Ended) 2022    Depression Monitoring  2023    Diabetes screen  2024    Lipids  2026    Hepatitis A vaccine  Aged Out    Hepatitis B vaccine  Aged Out    Hib vaccine  Aged Out  Meningococcal (ACWY) vaccine  Aged Out       PHYSICAL EXAMINATION:  [ INSTRUCTIONS:  \"[x]\" Indicates a positive item  \"[]\" Indicates a negative item  -- DELETE ALL ITEMS NOT EXAMINED]  [x] Alert  [x] Oriented to person/place/time    [x] No apparent distress  [] Toxic appearing    [] Face flushed appearing [x] Sclera clear  [] Lips are cyanotic      [x] Breathing appears normal  [] Appears tachypneic      [] Rash on visible skin    [x] Cranial Nerves II-XII grossly intact    [x] Motor grossly intact in visible upper extremities    [x] Motor grossly intact in visible lower extremities    [x] Normal Mood  [] Anxious appearing    [] Depressed appearing  [] Confused appearing      [] Poor short term memory  [] Poor long term memory    [] OTHER:      Due to this being a TeleHealth encounter, evaluation of the following organ systems is limited: Vitals/Constitutional/EENT/Resp/CV/GI//MS/Neuro/Skin/Heme-Lymph-Imm. LMP  (LMP Unknown)      No flowsheet data found. ASSESSMENT/PLAN:  1. Muscular dystrophy ([facility] [address]    - CBC with Auto Differential; Future  - Comprehensive Metabolic Panel; Future  - Lipid Panel; Future  - TSH; Future  - Vitamin D 25 Hydroxy; Future  - Hemoglobin A1C; Future    2. Urinary incontinence without sensory awareness    - CBC with Auto Differential; Future  - Comprehensive Metabolic Panel; Future  - Lipid Panel; Future  - TSH; Future  - Vitamin D 25 Hydroxy; Future  - Hemoglobin A1C; Future      Return in about 4 weeks (around 6/15/2022) for in office. An  electronic signature was used to authenticate this note.     --SAHNKAR Magana on 5/18/2022 at 8:42 AM        Pursuant to the emergency declaration under the [facility] waiver authority and the [employer] Act, this Virtual  Visit was conducted, with patient's consent, to reduce the patient's risk of exposure to COVID-19 and provide continuity of care for an established patient. Services were provided through a video synchronous discussion virtually to substitute for in-person clinic visit.

## 2022-05-23 ENCOUNTER — TELEPHONE (OUTPATIENT)
Dept: PRIMARY CARE CLINIC | Age: 57
End: 2022-05-23

## 2022-05-23 NOTE — TELEPHONE ENCOUNTER
----- Message from Robert Cornejo sent at 5/23/2022 11:38 AM CDT -----  Subject: Message to Provider    QUESTIONS  Information for Provider? Patient has not yet heard about any of the   shakes, XL pullups, pads, wipes and rubber gloves. Please Fax the RX for   all of this to [facility]? Litzy [address],Fax #? [phone]. They did not rec   these supplies last month. Please call [phone] if questions.  ---------------------------------------------------------------------------  --------------  CALL BACK INFO  What is the best way for the office to contact you? OK to leave message on   voicemail  Preferred Call Back Phone Number? [phone]  ---------------------------------------------------------------------------  --------------  SCRIPT ANSWERS  Relationship to Patient?  Self

## 2022-06-15 ENCOUNTER — OFFICE VISIT (OUTPATIENT)
Dept: PRIMARY CARE CLINIC | Age: 57
End: 2022-06-15
Payer: MEDICAID

## 2022-06-15 VITALS
SYSTOLIC BLOOD PRESSURE: 120 MMHG | TEMPERATURE: 97.3 F | HEIGHT: 64 IN | HEART RATE: 83 BPM | OXYGEN SATURATION: 97 % | WEIGHT: 193 LBS | DIASTOLIC BLOOD PRESSURE: 68 MMHG | BODY MASS INDEX: 32.95 KG/M2

## 2022-06-15 DIAGNOSIS — E78.49 OTHER HYPERLIPIDEMIA: ICD-10-CM

## 2022-06-15 DIAGNOSIS — H69.83 DYSFUNCTION OF BOTH EUSTACHIAN TUBES: ICD-10-CM

## 2022-06-15 DIAGNOSIS — G71.00 MUSCULAR DYSTROPHY (HCC): Primary | ICD-10-CM

## 2022-06-15 PROCEDURE — 99214 OFFICE O/P EST MOD 30 MIN: CPT | Performed by: NURSE PRACTITIONER

## 2022-06-15 ASSESSMENT — ENCOUNTER SYMPTOMS
RHINORRHEA: 0
VOICE CHANGE: 0
NAUSEA: 0
VOMITING: 0
PHOTOPHOBIA: 0
CONSTIPATION: 1
BACK PAIN: 0
COUGH: 0
SHORTNESS OF BREATH: 0
COLOR CHANGE: 0

## 2022-06-15 NOTE — PROGRESS NOTES
[address] [facility]  [address] [address]  [address] [address]  Dept: [phone]  Dept Fax: [phone]  Loc: [phone]    Davie Last is a 65 y.o. female who presents today for her medical conditions/complaints as noted below. Davie Last is c/o of Follow-up (4 weeks ) and Toe Injury (left big toe)        HPI:     HPI   Chief Complaint   Patient presents with    Follow-up     4 weeks     Toe Injury     left big toe       Patient presents today for follow-up hyperlipidemia; she has discontinued statin therapy. No fasting labs today; states she will get them on Friday. She has history of muscular dystrophy. Patient complains of left great toe injury. Patient hit it in on the wall while in her wheelchair. Bruising and tenderness present. Patient denies wanting imaging at this time. Patient also presents with constipation. She takes colace daily and still has difficulty producing a bowel movement. She states she has chronic constipation. Last bowel movement was yesterday. Patient denies having to strain. She has taken miralax in the past before.         Past Medical History:   Diagnosis Date    Anxiety     Depression     Hyperlipidemia     Muscular dystrophy ([address]     Urinary incontinence     Uterine cancer ([address]       Past Surgical History:   Procedure Laterality Date    APPENDECTOMY      CATARACT REMOVAL       SECTION      CHOLECYSTECTOMY      COLONOSCOPY  2017    Dr. Vonda Gomez (CERVIX STATUS UNKNOWN)      LEG SURGERY      mariela in right leg    NECK SURGERY      extra ligament    OVARY REMOVAL      TYMPANOSTOMY TUBE PLACEMENT         Vitals 6/15/2022 2022 2021 3/25/2021 2021    SYSTOLIC 113 520 [phone]   DIASTOLIC 68 76 82 74 72 82   Site Left Lower Arm - - - - -   Position Sitting - - - - -   Pulse 83 88 75 62 - -   Temp 97.3 97.2 97.6 97 - -   Resp - - - 16 - -   SpO2 97 96 98 97 - - Weight 193 lb 199 lb 205 lb 9.6 oz 205 lb 214 lb 214 lb   Height 5' 4\" 5' 4\" - 5' 4\" 5' 5\" 5' 5\"   Body mass index 33.12 kg/m2 34.15 kg/m2 - 35.18 kg/m2 35.61 kg/m2 35.61 kg/m2   Pain Level - - - - - -   Some recent data might be hidden       Family History   Problem Relation Age of Onset    Heart Disease Mother     Diabetes Mother     Heart Disease Father     Diabetes Father     Cancer Maternal Grandmother     Heart Disease Paternal Grandfather     Heart Disease Sister     Breast Cancer Maternal Aunt 39       Social History     Tobacco Use    Smoking status: Current Some Day Smoker     Packs/day: 0.25     Years: 20.00     Pack years: 5.00     Types: Cigarettes     Start date: 1980    Smokeless tobacco: Former User    Tobacco comment: used an electronic cig/smokes marijuana   Substance Use Topics    Alcohol use: No      Current Outpatient Medications on File Prior to Visit   Medication Sig Dispense Refill    Diapers & Supplies MISC XL pullups, wipes, bed pads, and gloves 120 each 11    rosuvastatin (CRESTOR) 10 MG tablet Take 1 tablet by mouth daily (Patient not taking: Reported on 6/15/2022) 30 tablet 1    Nutritional Supplements (HIGH-PROTEIN NUTRITIONAL SHAKE) LIQD Take one daily (Patient not taking: Reported on 6/15/2022) 30 each 11    triamcinolone (KENALOG) 0.025 % cream Apply a  thin layer to affected area topically 2 times daily. (Patient not taking: Reported on 1/13/2022) 1 Tube 0     No current facility-administered medications on file prior to visit. Allergies   Allergen Reactions    Erythromycin      Eye antibiotic ointment-reaction to the eyes swollen-daughter mentioned and is swollen noted.     Neomycin-Bacitracin Zn-Polymyx Hives     AND ITCHING    Neosporin [Bacitracin-Neomycin-Polymyxin]     Oxycodone-Acetaminophen Itching    Percocet [Oxycodone-Acetaminophen] Itching    Percodan [Oxycodone-Aspirin] Itching    Tramadol Itching    Food Diarrhea and Nausea And Vomiting External ear normal.      Nose: Nose normal.   Eyes:      Conjunctiva/sclera: Conjunctivae normal.      Pupils: Pupils are equal, round, and reactive to light. Cardiovascular:      Rate and Rhythm: Normal rate and regular rhythm. Heart sounds: Normal heart sounds, S1 normal and S2 normal.   Pulmonary:      Effort: Pulmonary effort is normal.      Breath sounds: Normal breath sounds. Abdominal:      General: Bowel sounds are normal.      Palpations: Abdomen is soft. Musculoskeletal:      Cervical back: Normal range of motion and neck supple. Left foot: Decreased range of motion. Swelling and tenderness present. Feet:       Comments: Great toe   Skin:     General: Skin is warm and dry. Neurological:      Mental Status: She is alert and oriented to person, place, and time. Psychiatric:         Behavior: Behavior normal.       /68 (Site: Left Lower Arm, Position: Sitting)   Pulse 83   Temp 97.3 °F (36.3 °C) (Temporal)   Ht 5' 4\" (1.626 m)   Wt 193 lb (87.5 kg)   LMP  (LMP Unknown)   SpO2 97%   BMI 33.13 kg/m²     Assessment:       Diagnosis Orders   1. Muscular dystrophy ([address]     2. Other hyperlipidemia     3. Dysfunction of both eustachian tubes           Plan:   More than 50% of the time was spent counseling and coordinating care for a total time of 30 min face to face. Patient declines imaging today; toe is healing she states. No evidence of infection. May continue treating constipation with grape fruit juice and prune juice as she states this is effective. Fasting labs in [address]. Follow-up in 6 months. Flonase daily for ETD.        PDMP Monitoring:    Last PDMP [facility] SYSTEM as Reviewed [facility]):  Review User Review Instant Review Result            Urine Drug Screenings (1 yr)     POCT Rapid Drug Screen  Collected: 9/22/2020  1:20 PM (Final result)    Complete Results          POCT Rapid Drug Screen  Collected: 2/28/2020 (Final result)    Complete Results          POCT Rapid Drug Screen  Collected: 11/22/2019  2:15 PM (Final result)    Complete Results          POCT Rapid Drug Screen  Collected: 8/21/2019  1:20 PM (Final result)    Complete Results          POCT Rapid Drug Screen  Collected: 5/24/2019  2:30 PM (Final result)    Complete Results          Urine Drug Screen  Collected: 9/3/2017  8:34 PM (Final result)    Complete Results          Barbiturate, Urine, Confirmation  Resulted: 10/12/2020 (Final result)    Complete Results              Medication Contract and Consent for Opioid Use Documents Filed     Patient Documents     Type of Document Status Date Received Received By Description    Medication Contract Signed 3/3/2017  3:35 PM Frederick Flores                  Patient given educational materials -see patient instructions. Discussed use, benefit, and side effects of prescribed medications. All patient questions answered. Pt voiced understanding. Reviewed health maintenance. Instructed to continue currentmedications, diet and exercise. Patient agreed with treatment plan. Follow up as directed. MEDICATIONS:  No orders of the defined types were placed in this encounter. ORDERS:  No orders of the defined types were placed in this encounter. Follow-up:  No follow-ups on file. PATIENT INSTRUCTIONS:  There are no Patient Instructions on file for this visit. Electronically signed by SHANKAR Hodge on 6/15/2022 at 12:40 PM    EMR Dragon/transcription disclaimer:  Much of thisencounter note is electronic transcription/translation of spoken language to printed texts. The electronic translation of spoken language may be erroneous, or at times, nonsensical words or phrases may be inadvertentlytranscribed.   Although I have reviewed the note for such errors, some may still exist.

## 2022-08-03 ENCOUNTER — TELEPHONE (OUTPATIENT)
Dept: GASTROENTEROLOGY | Facility: CLINIC | Age: 57
End: 2022-08-03

## 2022-08-03 NOTE — TELEPHONE ENCOUNTER
SPOKE WITH PT ABOUT BEING DUE FOR A REPEAT COLONOSCOPY. THEY ARE NOT READY TO MAKE AN APPT. AT THIS TIME.   LETTER SENT TO PCP.

## 2022-10-24 NOTE — PROGRESS NOTES
[facility]    Primary Physician Raina Reynolds NP  10/25/2022    Valencia Fonseca   1965      Chief Complaint   Patient presents with   • Colonoscopy       Subjective     HPI    Valencia Fonseca is a 57 y.o. female who presents as a referral for preventative maintenance. She has no complaints of nausea or vomiting. No change in bowels. No wt loss. No BRBPR. No melena. No abdominal pain.           COLONOSCOPY (2017 10:29) recall 5 years.   Tissue Pathology Exam (2017 11:06)      There is not a family history of colon polyps. There is a family history of colon cancer  Maternal aunt.      Past Medical History:   Diagnosis Date   • Cancer (HCC)     uterine   • Depression    • Dizziness    • History of pancreatitis     15 YRS AGO   • History of uterine cancer    • Hyperlipemia    • Incontinence of urine    • Muscular dystrophy (HCC)    • Myotonic dystrophy (HCC)    • Ptosis, both eyelids    • Sleep apnea     NO MACHINE       Past Surgical History:   Procedure Laterality Date   • ANKLE SURGERY Right    • APPENDECTOMY     • CATARACT EXTRACTION Bilateral     X2   •  SECTION     • CHOLECYSTECTOMY     • CLOSED REDUCTION METATARSAL FRACTURE     • COLONOSCOPY  2009   • COLONOSCOPY N/A 2017    Procedure: COLONOSCOPY WITH ANESTHESIA;  Surgeon: Hunter Coats MD;  Location: [facility] ENDOSCOPY;  Service:    • ENDOSCOPY  2014   • ENDOSCOPY N/A 2017    Procedure: ESOPHAGOGASTRODUODENOSCOPY WITH ANESTHESIA;  Surgeon: Hunter Coats MD;  Location: [facility] ENDOSCOPY;  Service:    • ENDOSCOPY N/A 10/8/2020    Procedure: ESOPHAGOGASTRODUODENOSCOPY WITH ANESTHESIA;  Surgeon: Hunter Coats MD;  Location: [facility] ENDOSCOPY;  Service: Gastroenterology;  Laterality: N/A;  pre: dysphagia.  post: duodenal polyp. esophgus dilated.  zia dugan    • EYE PTOSIS REPAIR     • FOOT SURGERY Left    • FRONTALIS SUSPENSION Bilateral 2017    Procedure: BILATERAL UPPER LID FRONTALIS  SUSPENSION WITH FASCIA;  Surgeon: Julius Hernandez MD;  Location: [facility] OR [facility];  Service:    • HYSTERECTOMY     • NECK SURGERY         Outpatient Medications Marked as Taking for the 10/25/22 encounter (Office Visit) with Kiah Jackson APRN   Medication Sig Dispense Refill   • Diapers & Supplies misc pullups wipes and bed pads. One month supply-patient needs appointment     • diphenoxylate-atropine (LOMOTIL) 2.5-0.025 MG per tablet Take  by mouth As Needed.     • guaiFENesin (MUCINEX) 600 MG 12 hr tablet Take 1,200 mg by mouth 2 (Two) Times a Day.         Allergies   Allergen Reactions   • Erythromycin Other (See Comments)     Eye antibiotic ointment-reaction to the eyes swollen-daughter mentioned and is swollen noted.   • Food Diarrhea and Nausea And Vomiting     Allergic to all types of peppers   • Neosporin [Neomycin-Bacitracin Zn-Polymyx] Hives and Itching   • Percocet [Oxycodone-Acetaminophen] Itching   • Percodan [Oxycodone-Aspirin] Itching   • Tramadol Itching       Social History     Socioeconomic History   • Marital status:    Tobacco Use   • Smoking status: Every Day     Packs/day: 0.50     Years: 30.00     Pack years: 15.00     Types: Cigarettes   • Smokeless tobacco: Never   Vaping Use   • Vaping Use: Never used   Substance and Sexual Activity   • Alcohol use: Yes     Comment: Rarely-2 or 3 times a year   • Drug use: Yes     Types: Marijuana   • Sexual activity: Defer       Family History   Problem Relation Age of Onset   • Colon cancer Maternal Aunt    • Colon polyps Neg Hx    • Malig Hyperthermia Neg Hx        Review of Systems   Constitutional: Negative for chills, fever and unexpected weight change.   Respiratory: Negative for shortness of breath.    Cardiovascular: Negative for chest pain.   Gastrointestinal: Negative for abdominal distention, abdominal pain, anal bleeding, blood in stool, constipation, diarrhea, nausea and vomiting.       Objective     Vitals:    10/25/22 1008    BP: 144/86   Pulse: 78   Temp: 96.4 °F (35.8 °C)   SpO2: 97%         10/25/22  1008   Weight: 84.4 kg (186 lb)     Body mass index is 30.95 kg/m².    Physical Exam  Vitals reviewed.   Constitutional:       General: She is not in acute distress.  Cardiovascular:      Rate and Rhythm: Normal rate and regular rhythm.      Heart sounds: Normal heart sounds.   Pulmonary:      Effort: Pulmonary effort is normal.      Breath sounds: Normal breath sounds.   Abdominal:      General: Bowel sounds are normal. There is no distension.      Palpations: Abdomen is soft.      Tenderness: There is no abdominal tenderness.   Skin:     General: Skin is warm and dry.   Neurological:      Mental Status: She is alert.         Imaging Results (Most Recent)     None          Assessment & Plan     Diagnoses and all orders for this visit:    1. Hx of colonic polyp (Primary)  -     Case Request; Standing  -     Case Request    2. Family hx of colon cancer  -     Case Request; Standing  -     Case Request    Other orders  -     Implement Anesthesia Orders Day of Procedure; Standing  -     Obtain Informed Consent; Standing      Plan for colonoscopy. Use miralax prep.                COLONOSCOPY WITH ANESTHESIA (N/A)  All risks, benefits, alternatives, and indications of colonoscopy procedure have been discussed with the patient. Risks to include perforation of the colon requiring possible surgery or colostomy, risk of bleeding from biopsies or removal of colon tissue, possibility of missing a colon polyp or cancer, or adverse drug reaction.  Benefits to include the diagnosis and management of disease of the colon and rectum. Alternatives to include barium enema, radiographic evaluation, lab testing or no intervention. Pt verbalizes understanding and agrees.       HAILEY Alatorre     negative...

## 2022-10-25 ENCOUNTER — OFFICE VISIT (OUTPATIENT)
Dept: GASTROENTEROLOGY | Facility: CLINIC | Age: 57
End: 2022-10-25

## 2022-10-25 VITALS
HEIGHT: 65 IN | BODY MASS INDEX: 30.99 KG/M2 | OXYGEN SATURATION: 97 % | TEMPERATURE: 96.4 F | HEART RATE: 78 BPM | SYSTOLIC BLOOD PRESSURE: 144 MMHG | DIASTOLIC BLOOD PRESSURE: 86 MMHG | WEIGHT: 186 LBS

## 2022-10-25 DIAGNOSIS — Z80.0 FAMILY HX OF COLON CANCER: ICD-10-CM

## 2022-10-25 DIAGNOSIS — Z86.010 HX OF COLONIC POLYP: Primary | ICD-10-CM

## 2022-10-25 PROCEDURE — S0260 H&P FOR SURGERY: HCPCS | Performed by: NURSE PRACTITIONER

## 2022-11-17 ENCOUNTER — ANESTHESIA (OUTPATIENT)
Dept: GASTROENTEROLOGY | Facility: HOSPITAL | Age: 57
End: 2022-11-17

## 2022-11-17 ENCOUNTER — ANESTHESIA EVENT (OUTPATIENT)
Dept: GASTROENTEROLOGY | Facility: HOSPITAL | Age: 57
End: 2022-11-17

## 2022-11-17 ENCOUNTER — TELEPHONE (OUTPATIENT)
Dept: GASTROENTEROLOGY | Facility: CLINIC | Age: 57
End: 2022-11-17

## 2022-11-17 ENCOUNTER — HOSPITAL ENCOUNTER (OUTPATIENT)
Facility: HOSPITAL | Age: 57
Setting detail: HOSPITAL OUTPATIENT SURGERY
Discharge: HOME | End: 2022-11-17
Attending: INTERNAL MEDICINE | Admitting: INTERNAL MEDICINE

## 2022-11-17 VITALS
OXYGEN SATURATION: 95 % | WEIGHT: 185 LBS | BODY MASS INDEX: 30.82 KG/M2 | DIASTOLIC BLOOD PRESSURE: 77 MMHG | HEIGHT: 65 IN | RESPIRATION RATE: 15 BRPM | SYSTOLIC BLOOD PRESSURE: 123 MMHG | HEART RATE: 90 BPM | TEMPERATURE: 97.1 F

## 2022-11-17 PROCEDURE — 25010000002 PROPOFOL 10 MG/ML EMULSION: Performed by: NURSE ANESTHETIST, CERTIFIED REGISTERED

## 2022-11-17 PROCEDURE — 45385 COLONOSCOPY W/LESION REMOVAL: CPT | Performed by: INTERNAL MEDICINE

## 2022-11-17 RX ORDER — PROPOFOL 10 MG/ML
VIAL (ML) INTRAVENOUS AS NEEDED
Status: DISCONTINUED | OUTPATIENT
Start: 2022-11-17 | End: 2022-11-17 | Stop reason: SURG

## 2022-11-17 RX ORDER — LIDOCAINE HYDROCHLORIDE 10 MG/ML
0.5 INJECTION, SOLUTION EPIDURAL; INFILTRATION; INTRACAUDAL; PERINEURAL ONCE AS NEEDED
Status: DISCONTINUED | OUTPATIENT
Start: 2022-11-17 | End: 2022-11-17 | Stop reason: HOSPADM

## 2022-11-17 RX ORDER — SODIUM CHLORIDE 9 MG/ML
500 INJECTION, SOLUTION INTRAVENOUS CONTINUOUS PRN
Status: DISCONTINUED | OUTPATIENT
Start: 2022-11-17 | End: 2022-12-01 | Stop reason: HOSPADM

## 2022-11-17 RX ORDER — ONDANSETRON 2 MG/ML
4 INJECTION INTRAMUSCULAR; INTRAVENOUS ONCE AS NEEDED
Status: DISCONTINUED | OUTPATIENT
Start: 2022-11-17 | End: 2022-12-01 | Stop reason: HOSPADM

## 2022-11-17 RX ORDER — SODIUM CHLORIDE 0.9 % (FLUSH) 0.9 %
10 SYRINGE (ML) INJECTION AS NEEDED
Status: DISCONTINUED | OUTPATIENT
Start: 2022-11-17 | End: 2022-11-17 | Stop reason: HOSPADM

## 2022-11-17 RX ORDER — LIDOCAINE HYDROCHLORIDE 20 MG/ML
INJECTION, SOLUTION EPIDURAL; INFILTRATION; INTRACAUDAL; PERINEURAL AS NEEDED
Status: DISCONTINUED | OUTPATIENT
Start: 2022-11-17 | End: 2022-11-17 | Stop reason: SURG

## 2022-11-17 RX ADMIN — PROPOFOL 275 MG: 10 INJECTION, EMULSION INTRAVENOUS at 12:22

## 2022-11-17 RX ADMIN — LIDOCAINE HYDROCHLORIDE 60 MG: 20 INJECTION, SOLUTION EPIDURAL; INFILTRATION; INTRACAUDAL; PERINEURAL at 12:22

## 2022-11-17 RX ADMIN — SODIUM CHLORIDE 500 ML: 9 INJECTION, SOLUTION INTRAVENOUS at 11:43

## 2022-11-17 NOTE — ANESTHESIA PREPROCEDURE EVALUATION
Anesthesia Evaluation     history of anesthetic complications: prolonged sedation  NPO Solid Status: > 8 hours  NPO Liquid Status: > 2 hours           Airway   Mallampati: I  TM distance: >3 FB  Neck ROM: full  No difficulty expected  Dental      Pulmonary    (+) a smoker Current, sleep apnea,   Cardiovascular   Exercise tolerance: poor (<4 METS)    (+) hyperlipidemia,   (-) CAD      Neuro/Psych  (-) seizures, TIA, CVA  GI/Hepatic/Renal/Endo    (-) liver disease, no renal disease, diabetes    Musculoskeletal         ROS comment: Muscular dystrophy  Abdominal    Substance History      OB/GYN          Other                        Anesthesia Plan    ASA 3     MAC     intravenous induction     Anesthetic plan, risks, benefits, and alternatives have been provided, discussed and informed consent has been obtained with: patient.        CODE STATUS:

## 2022-11-17 NOTE — ANESTHESIA POSTPROCEDURE EVALUATION
"Patient: Valencia Fonseca    Procedure Summary     Date: 11/17/22 Room / Location: [facility] ENDOSCOPY 2 / [facility] END[facility]COPY    Anesthesia Start: 1206 Anesthesia Stop: 1241    Procedure: COLONOSCOPY WITH ANESTHESIA Diagnosis:       Hx of colonic polyp      Family hx of colon cancer      (Hx of colonic polyp [Z86.010])      (Family hx of colon cancer [Z80.0])    Surgeons: Hunter Coats MD Provider: Yogesh Uriostegui CRNA    Anesthesia Type: MAC ASA Status: 3          Anesthesia Type: MAC    Vitals  No vitals data found for the desired time range.          Post Anesthesia Care and Evaluation    Patient location during evaluation: PHASE II  Patient participation: complete - patient participated  Level of consciousness: awake and alert  Pain management: adequate    Airway patency: patent  Anesthetic complications: No anesthetic complications    Cardiovascular status: acceptable  Respiratory status: acceptable  Hydration status: acceptable    Comments: Blood pressure 148/91, pulse 91, temperature 97.1 °F (36.2 °C), temperature source Temporal, resp. rate 20, height 165.1 cm (65\"), weight 83.9 kg (185 lb), SpO2 97 %, not currently breastfeeding.    Pt discharged from PACU based on jozef score >8      "

## 2022-12-29 ENCOUNTER — SCHEDULED TELEPHONE ENCOUNTER (OUTPATIENT)
Dept: PRIMARY CARE CLINIC | Age: 57
End: 2022-12-29
Payer: MEDICAID

## 2022-12-29 DIAGNOSIS — U07.1 COVID-19: Primary | ICD-10-CM

## 2022-12-29 PROCEDURE — 99213 OFFICE O/P EST LOW 20 MIN: CPT | Performed by: NURSE PRACTITIONER

## 2022-12-29 RX ORDER — BENZONATATE 200 MG/1
200 CAPSULE ORAL 3 TIMES DAILY PRN
Qty: 30 CAPSULE | Refills: 0 | Status: SHIPPED | OUTPATIENT
Start: 2022-12-29

## 2022-12-29 RX ORDER — ALBUTEROL SULFATE 90 UG/1
2 AEROSOL, METERED RESPIRATORY (INHALATION) 4 TIMES DAILY PRN
Qty: 18 G | Refills: 0 | Status: SHIPPED | OUTPATIENT
Start: 2022-12-29

## 2022-12-29 RX ORDER — METHYLPREDNISOLONE 4 MG/1
TABLET ORAL
Qty: 1 KIT | Refills: 0 | Status: SHIPPED | OUTPATIENT
Start: 2022-12-29

## 2022-12-29 ASSESSMENT — ENCOUNTER SYMPTOMS
COUGH: 0
SHORTNESS OF BREATH: 0
RHINORRHEA: 0
NAUSEA: 0
VOMITING: 0
PHOTOPHOBIA: 0
BACK PAIN: 0
VOICE CHANGE: 0
COLOR CHANGE: 0

## 2022-12-29 NOTE — PROGRESS NOTES
[address] [address]             Phone:  [phone]  Fax:  [phone]       2022    TELEHEALTH EVALUATION -- Audio/Visual (During [id number] public health emergency)    HPI:  Chief Complaint   Patient presents with    Medication Refill    Other     orders       Jessica Prince (:  1965) has requested an audio/video evaluation for the following concern(s):    Patient presents for evaluation of cough, congestion, nausea, body aches. She reports cough is productive. She denies fever. She has taken mucinex, tylenol. Her symptoms started one week ago. She had a positive covid test at home 4-5 days ago. Review of Systems   Constitutional:  Negative for chills and fever. HENT:  Negative for ear pain, hearing loss, rhinorrhea and voice change. Eyes:  Negative for photophobia and visual disturbance. Respiratory:  Negative for cough and shortness of breath. Cardiovascular:  Negative for chest pain and palpitations. Gastrointestinal:  Negative for nausea and vomiting. Endocrine: Negative. Negative for cold intolerance and heat intolerance. Genitourinary:  Negative for difficulty urinating and flank pain. Musculoskeletal:  Negative for back pain and neck pain. Skin:  Negative for color change and rash. Allergic/Immunologic: Negative for environmental allergies and food allergies. Neurological:  Negative for dizziness, speech difficulty and headaches. Hematological:  Does not bruise/bleed easily. Psychiatric/Behavioral:  Negative for sleep disturbance and suicidal ideas. Prior to Visit Medications    Medication Sig Taking? Authorizing Provider   methylPREDNISolone (MEDROL DOSEPACK) 4 MG tablet Take by mouth.  Yes SHANKAR Muhammad   benzonatate (TESSALON) 200 MG capsule Take 1 capsule by mouth 3 times daily as needed for Cough Yes SHANKAR Muhammad   albuterol sulfate HFA (VENTOLIN HFA) 108 (90 Base) MCG/ACT inhaler Inhale 2 puffs into the lungs 4 times daily as needed for Wheezing Yes Jo Sides, APRN   rosuvastatin (CRESTOR) 10 MG tablet Take 1 tablet by mouth daily Yes Jo Sides, APRN   Diapers & Supplies MISC XL pullups, wipes, bed pads, and gloves Yes Jo Sides, APRN   Nutritional Supplements (HIGH-PROTEIN NUTRITIONAL SHAKE) LIQD Take one daily Yes Alabaster Sides, APRN       Social History     Tobacco Use    Smoking status: Some Days     Packs/day: 0.25     Years: 20.00     Pack years: 5.00     Types: Cigarettes     Start date:     Smokeless tobacco: Former    Tobacco comments:     used an electronic cig/smokes marijuana   Vaping Use    Vaping Use: Never used   Substance Use Topics    Alcohol use: No    Drug use: No     Types: Marijuana (Weed)        Allergies   Allergen Reactions    Erythromycin      Eye antibiotic ointment-reaction to the eyes swollen-daughter mentioned and is swollen noted.     Neomycin-Bacitracin Zn-Polymyx Hives     AND ITCHING    Neosporin [Bacitracin-Neomycin-Polymyxin]     Oxycodone-Acetaminophen Itching    Percocet [Oxycodone-Acetaminophen] Itching    Percodan [Oxycodone-Aspirin] Itching    Tramadol Itching    Food Diarrhea and Nausea And Vomiting     Allergic to all types of peppers   ,   Past Medical History:   Diagnosis Date    Anxiety     Depression     Hyperlipidemia     Muscular dystrophy ([address]     Urinary incontinence     Uterine cancer ([address]    ,   Past Surgical History:   Procedure Laterality Date    APPENDECTOMY      CATARACT REMOVAL       SECTION      CHOLECYSTECTOMY      COLONOSCOPY  2017    Dr. Garcia Ruiz (CERVIX STATUS UNKNOWN)      LEG SURGERY      mariela in right leg    NECK SURGERY      extra ligament    OVARY REMOVAL      TYMPANOSTOMY TUBE PLACEMENT     ,   Social History     Tobacco Use    Smoking status: Some Days     Packs/day: 0.25     Years: 20.00     Pack years: 5.00     Types: Cigarettes     Start date:     Smokeless tobacco: Former Tobacco comments:     used an electronic cig/smokes marijuana   Vaping Use    Vaping Use: Never used   Substance Use Topics    Alcohol use: No    Drug use: No     Types: Marijuana (Weed)   ,   Family History   Problem Relation Age of Onset    Heart Disease Mother     Diabetes Mother     Heart Disease Father     Diabetes Father     Cancer Maternal Grandmother     Heart Disease Paternal Grandfather     Heart Disease Sister     Breast Cancer Maternal Aunt 39   ,   Immunization History   Administered Date(s) Administered    COVID-19, MODERNA BLUE border, Primary or Immunocompromised, (age 12y+), IM, 100 mcg/0.5mL 03/02/2021, 03/30/2021    Influenza, FLUARIX, FLULAVAL, FLUZONE (age 10 mo+) AND AFLURIA, (age 1 y+), PF, 0.5mL 09/22/2020   ,   Health Maintenance   Topic Date Due    HIV screen  Never done    Hepatitis C screen  Never done    DTaP/Tdap/Td vaccine (1 - Tdap) Never done    Shingles vaccine (1 of 2) Never done    COVID-19 Vaccine (3 - Booster for Moderna series) 05/25/2021    Lipids  03/25/2022    Breast cancer screen  05/25/2022    Colorectal Cancer Screen  07/18/2022    Flu vaccine (1) 08/01/2022    Pneumococcal 0-64 years Vaccine (1 - PCV) 06/18/2025 (Originally 3/12/1971)    Depression Monitoring  05/18/2023    Diabetes screen  03/25/2024    Hepatitis A vaccine  Aged Out    Hib vaccine  Aged Out    Meningococcal (ACWY) vaccine  Aged Out       PHYSICAL EXAMINATION:  [ INSTRUCTIONS:  \"[x]\" Indicates a positive item  \"[]\" Indicates a negative item  -- DELETE ALL ITEMS NOT EXAMINED]  [x] Alert  [x] Oriented to person/place/time    [x] No apparent distress  [] Toxic appearing    [] Face flushed appearing [x] Sclera clear  [] Lips are cyanotic      [x] Breathing appears normal  [] Appears tachypneic      [] Rash on visible skin    [x] Cranial Nerves II-XII grossly intact    [x] Motor grossly intact in visible upper extremities    [x] Motor grossly intact in visible lower extremities    [x] Normal Mood  [] Anxious appearing    [] Depressed appearing  [] Confused appearing      [] Poor short term memory  [] Poor long term memory    [] OTHER:      Due to this being a TeleHealth encounter, evaluation of the following organ systems is limited: Vitals/Constitutional/EENT/Resp/CV/GI//MS/Neuro/Skin/Heme-Lymph-Imm. LMP  (LMP Unknown)      No flowsheet data found. ASSESSMENT/PLAN:  1. COVID-19    - Go to ER for worsening symptoms. - methylPREDNISolone (MEDROL DOSEPACK) 4 MG tablet; Take by mouth. Dispense: 1 kit; Refill: 0  - benzonatate (TESSALON) 200 MG capsule; Take 1 capsule by mouth 3 times daily as needed for Cough  Dispense: 30 capsule; Refill: 0  - albuterol sulfate HFA (VENTOLIN HFA) 108 (90 Base) MCG/ACT inhaler; Inhale 2 puffs into the lungs 4 times daily as needed for Wheezing  Dispense: 18 g; Refill: 0    Return if symptoms worsen or fail to improve. An  electronic signature was used to authenticate this note. --[facility] Hospital CenterSHANKAR on 12/29/2022 at 2:43 PM        Pursuant to the emergency declaration under the [facility] waiver authority and the [employer] Act, this Virtual  Visit was conducted, with patient's consent, to reduce the patient's risk of exposure to COVID-19 and provide continuity of care for an established patient. Services were provided through a video synchronous discussion virtually to substitute for in-person clinic visit.

## 2023-01-04 ENCOUNTER — OFFICE VISIT (OUTPATIENT)
Dept: PRIMARY CARE CLINIC | Age: 58
End: 2023-01-04
Payer: MEDICAID

## 2023-01-04 ENCOUNTER — TELEPHONE (OUTPATIENT)
Dept: INTERNAL MEDICINE | Age: 58
End: 2023-01-04

## 2023-01-04 ENCOUNTER — HOSPITAL ENCOUNTER (OUTPATIENT)
Dept: GENERAL RADIOLOGY | Age: 58
Discharge: HOME | End: 2023-01-04
Payer: MEDICAID

## 2023-01-04 VITALS
HEART RATE: 81 BPM | OXYGEN SATURATION: 97 % | DIASTOLIC BLOOD PRESSURE: 72 MMHG | HEIGHT: 64 IN | BODY MASS INDEX: 33.13 KG/M2 | TEMPERATURE: 97.2 F | SYSTOLIC BLOOD PRESSURE: 124 MMHG

## 2023-01-04 DIAGNOSIS — G71.00 MUSCULAR DYSTROPHY (HCC): ICD-10-CM

## 2023-01-04 DIAGNOSIS — J06.9 UPPER RESPIRATORY TRACT INFECTION, UNSPECIFIED TYPE: ICD-10-CM

## 2023-01-04 DIAGNOSIS — N39.42 URINARY INCONTINENCE WITHOUT SENSORY AWARENESS: ICD-10-CM

## 2023-01-04 DIAGNOSIS — R15.9 INCONTINENCE OF FECES, UNSPECIFIED FECAL INCONTINENCE TYPE: ICD-10-CM

## 2023-01-04 DIAGNOSIS — J06.9 UPPER RESPIRATORY TRACT INFECTION, UNSPECIFIED TYPE: Primary | ICD-10-CM

## 2023-01-04 LAB
INFLUENZA A ANTIBODY: NORMAL
INFLUENZA B ANTIBODY: NORMAL

## 2023-01-04 PROCEDURE — 71046 X-RAY EXAM CHEST 2 VIEWS: CPT

## 2023-01-04 PROCEDURE — 99214 OFFICE O/P EST MOD 30 MIN: CPT | Performed by: NURSE PRACTITIONER

## 2023-01-04 PROCEDURE — 87804 INFLUENZA ASSAY W/OPTIC: CPT | Performed by: NURSE PRACTITIONER

## 2023-01-04 PROCEDURE — 71046 X-RAY EXAM CHEST 2 VIEWS: CPT | Performed by: RADIOLOGY

## 2023-01-04 RX ORDER — DEXTROMETHORPHAN HYDROBROMIDE AND PROMETHAZINE HYDROCHLORIDE 15; 6.25 MG/5ML; MG/5ML
5 SYRUP ORAL 4 TIMES DAILY PRN
Qty: 180 ML | Refills: 0 | Status: SHIPPED | OUTPATIENT
Start: 2023-01-04 | End: 2023-01-11

## 2023-01-04 RX ORDER — KETOTIFEN FUMARATE 0.025 %
DROPS OPHTHALMIC (EYE)
Qty: 30 EACH | Refills: 11 | Status: SHIPPED | OUTPATIENT
Start: 2023-01-04

## 2023-01-04 RX ORDER — DOXYCYCLINE HYCLATE 100 MG
100 TABLET ORAL 2 TIMES DAILY
Qty: 20 TABLET | Refills: 0 | Status: SHIPPED | OUTPATIENT
Start: 2023-01-04 | End: 2023-01-14

## 2023-01-04 ASSESSMENT — ENCOUNTER SYMPTOMS
COLOR CHANGE: 0
SHORTNESS OF BREATH: 1
PHOTOPHOBIA: 0
VOMITING: 0
VOICE CHANGE: 0
RHINORRHEA: 1
DIARRHEA: 1
BACK PAIN: 0
COUGH: 1
NAUSEA: 0

## 2023-01-04 ASSESSMENT — PATIENT HEALTH QUESTIONNAIRE - PHQ9
SUM OF ALL RESPONSES TO PHQ9 QUESTIONS 1 & 2: 0
5. POOR APPETITE OR OVEREATING: 0
9. THOUGHTS THAT YOU WOULD BE BETTER OFF DEAD, OR OF HURTING YOURSELF: 0
6. FEELING BAD ABOUT YOURSELF - OR THAT YOU ARE A FAILURE OR HAVE LET YOURSELF OR YOUR FAMILY DOWN: 0
7. TROUBLE CONCENTRATING ON THINGS, SUCH AS READING THE NEWSPAPER OR WATCHING TELEVISION: 0
SUM OF ALL RESPONSES TO PHQ QUESTIONS 1-9: 4
SUM OF ALL RESPONSES TO PHQ QUESTIONS 1-9: 4
3. TROUBLE FALLING OR STAYING ASLEEP: 2
SUM OF ALL RESPONSES TO PHQ QUESTIONS 1-9: 4
SUM OF ALL RESPONSES TO PHQ QUESTIONS 1-9: 4
6. FEELING BAD ABOUT YOURSELF - OR THAT YOU ARE A FAILURE OR HAVE LET YOURSELF OR YOUR FAMILY DOWN: 0
2. FEELING DOWN, DEPRESSED OR HOPELESS: 0
10. IF YOU CHECKED OFF ANY PROBLEMS, HOW DIFFICULT HAVE THESE PROBLEMS MADE IT FOR YOU TO DO YOUR WORK, TAKE CARE OF THINGS AT HOME, OR GET ALONG WITH OTHER PEOPLE: 0
8. MOVING OR SPEAKING SO SLOWLY THAT OTHER PEOPLE COULD HAVE NOTICED. OR THE OPPOSITE, BEING SO FIGETY OR RESTLESS THAT YOU HAVE BEEN MOVING AROUND A LOT MORE THAN USUAL: 0
10. IF YOU CHECKED OFF ANY PROBLEMS, HOW DIFFICULT HAVE THESE PROBLEMS MADE IT FOR YOU TO DO YOUR WORK, TAKE CARE OF THINGS AT HOME, OR GET ALONG WITH OTHER PEOPLE: 0
2. FEELING DOWN, DEPRESSED OR HOPELESS: 0
4. FEELING TIRED OR HAVING LITTLE ENERGY: 2
5. POOR APPETITE OR OVEREATING: 0
SUM OF ALL RESPONSES TO PHQ QUESTIONS 1-9: 4
SUM OF ALL RESPONSES TO PHQ QUESTIONS 1-9: 4
1. LITTLE INTEREST OR PLEASURE IN DOING THINGS: 0
4. FEELING TIRED OR HAVING LITTLE ENERGY: 2
8. MOVING OR SPEAKING SO SLOWLY THAT OTHER PEOPLE COULD HAVE NOTICED. OR THE OPPOSITE, BEING SO FIGETY OR RESTLESS THAT YOU HAVE BEEN MOVING AROUND A LOT MORE THAN USUAL: 0
9. THOUGHTS THAT YOU WOULD BE BETTER OFF DEAD, OR OF HURTING YOURSELF: 0
3. TROUBLE FALLING OR STAYING ASLEEP: 2
SUM OF ALL RESPONSES TO PHQ QUESTIONS 1-9: 4
SUM OF ALL RESPONSES TO PHQ QUESTIONS 1-9: 4

## 2023-01-04 NOTE — PROGRESS NOTES
[address] [facility] CARE  [address] [address]  [address] [address]  Dept: [phone]  Dept Fax: [phone]  Loc: [phone]    Bindu Villanueva is a 65 y.o. female who presents today for her medical conditions/complaints as noted below. Bindu Villanueva is c/o of URI (Chest heaviness with SOB, cough, fever, congestion, body aches and chills - been going on about 2 weeks)        HPI:     HPI   Chief Complaint   Patient presents with    URI     Chest heaviness with SOB, cough, fever, congestion, body aches and chills - been going on about 2 weeks     Patient presents today for evaluation of congestion, cough, SOB, body aches, fever, chills for the last 2 weeks. She states 3 days ago diarrhea started. She had a positive covid test on . She was given medrol dose pack, tessalon and albuterol. She has been taking Mucinex OTC.        Past Medical History:   Diagnosis Date    Anxiety     Depression     Hyperlipidemia     Muscular dystrophy ([address]     Urinary incontinence     Uterine cancer ([facility] [address]       Past Surgical History:   Procedure Laterality Date    APPENDECTOMY      CATARACT REMOVAL       SECTION      CHOLECYSTECTOMY      COLONOSCOPY  2017    Dr. Isma Heard (CERVIX STATUS UNKNOWN)      LEG SURGERY      mariela in right leg    NECK SURGERY      extra ligament    OVARY REMOVAL      TYMPANOSTOMY TUBE PLACEMENT         Vitals 2023 6/15/2022 2022 2021 3/25/2021    SYSTOLIC 569 [phone]   DIASTOLIC 72 68 76 82 74 72   Site - Left Lower Arm - - - -   Position - Sitting - - - -   Pulse 81 83 88 75 62 -   Temp 97.2 97.3 97.2 97.6 97 -   Resp - - - - 16 -   SpO2 97 97 96 98 97 -   Weight (No Data) 193 lb 199 lb 205 lb 9.6 oz 205 lb 214 lb   Height 5' 4\" 5' 4\" 5' 4\" - 5' 4\" 5' 5\"   Body mass index - 33.12 kg/m2 34.15 kg/m2 - 35.18 kg/m2 35.61 kg/m2   Pain Level - - - - - -   Some recent data might be hidden       Family History Problem Relation Age of Onset    Heart Disease Mother     Diabetes Mother     Heart Disease Father     Diabetes Father     Cancer Maternal Grandmother     Heart Disease Paternal Grandfather     Heart Disease Sister     Breast Cancer Maternal Aunt 39       Social History     Tobacco Use    Smoking status: Some Days     Packs/day: 0.25     Years: 20.00     Pack years: 5.00     Types: Cigarettes     Start date: 37    Smokeless tobacco: Former    Tobacco comments:     used an electronic cig/smokes marijuana   Substance Use Topics    Alcohol use: No      Current Outpatient Medications on File Prior to Visit   Medication Sig Dispense Refill    benzonatate (TESSALON) 200 MG capsule Take 1 capsule by mouth 3 times daily as needed for Cough 30 capsule 0    albuterol sulfate HFA (VENTOLIN HFA) 108 (90 Base) MCG/ACT inhaler Inhale 2 puffs into the lungs 4 times daily as needed for Wheezing 18 g 0    rosuvastatin (CRESTOR) 10 MG tablet Take 1 tablet by mouth daily 30 tablet 1     No current facility-administered medications on file prior to visit. Allergies   Allergen Reactions    Erythromycin      Eye antibiotic ointment-reaction to the eyes swollen-daughter mentioned and is swollen noted.     Neomycin-Bacitracin Zn-Polymyx Hives     AND ITCHING    Neosporin [Bacitracin-Neomycin-Polymyxin]     Oxycodone-Acetaminophen Itching    Percocet [Oxycodone-Acetaminophen] Itching    Percodan [Oxycodone-Aspirin] Itching    Tramadol Itching    Food Diarrhea and Nausea And Vomiting     Allergic to all types of peppers       Health Maintenance   Topic Date Due    HIV screen  Never done    Hepatitis C screen  Never done    DTaP/Tdap/Td vaccine (1 - Tdap) Never done    Shingles vaccine (1 of 2) Never done    COVID-19 Vaccine (3 - Booster for Moderna series) 05/25/2021    Lipids  03/25/2022    Breast cancer screen  05/25/2022    Flu vaccine (1) 08/01/2022    Pneumococcal 0-64 years Vaccine (1 - PCV) 06/18/2025 (Originally 3/12/1971) Depression Monitoring  01/04/2024    Diabetes screen  03/25/2024    Colorectal Cancer Screen  11/17/2027    Hepatitis A vaccine  Aged Out    Hib vaccine  Aged Out    Meningococcal (ACWY) vaccine  Aged Out       Subjective:      Review of Systems   Constitutional:  Negative for chills and fever. HENT:  Positive for congestion and rhinorrhea. Negative for ear pain, hearing loss and voice change. Eyes:  Negative for photophobia and visual disturbance. Respiratory:  Positive for cough and shortness of breath. Cardiovascular:  Negative for chest pain and palpitations. Gastrointestinal:  Positive for diarrhea. Negative for nausea and vomiting. Endocrine: Negative. Negative for cold intolerance and heat intolerance. Genitourinary:  Negative for difficulty urinating and flank pain. Musculoskeletal:  Negative for back pain and neck pain. Skin:  Negative for color change and rash. Allergic/Immunologic: Negative for environmental allergies and food allergies. Neurological:  Negative for dizziness, speech difficulty and headaches. Hematological:  Does not bruise/bleed easily. Psychiatric/Behavioral:  Negative for sleep disturbance and suicidal ideas. Objective:     Physical Exam  Vitals and nursing note reviewed. Constitutional:       Appearance: She is well-developed. HENT:      Head: Atraumatic. Right Ear: External ear normal.      Left Ear: External ear normal.      Nose: Nose normal.   Eyes:      Conjunctiva/sclera: Conjunctivae normal.      Pupils: Pupils are equal, round, and reactive to light. Cardiovascular:      Rate and Rhythm: Normal rate and regular rhythm. Heart sounds: Normal heart sounds, S1 normal and S2 normal.   Pulmonary:      Effort: Pulmonary effort is normal.      Breath sounds: Examination of the right-lower field reveals rhonchi. Examination of the left-lower field reveals rhonchi. Rhonchi present.    Abdominal:      General: Bowel sounds are normal. Palpations: Abdomen is soft. Musculoskeletal:         General: Normal range of motion. Cervical back: Normal range of motion and neck supple. Skin:     General: Skin is warm and dry. Neurological:      Mental Status: She is alert and oriented to person, place, and time. Psychiatric:         Behavior: Behavior normal.     /72   Pulse 81   Temp 97.2 °F (36.2 °C) (Temporal)   Ht 5' 4\" (1.626 m)   LMP  (LMP Unknown)   SpO2 97%   BMI 33.13 kg/m²     Assessment:       Diagnosis Orders   1. Upper respiratory tract infection, unspecified type  DME Order for Nebulizer as OP    XR CHEST STANDARD (2 VW)    POCT Influenza A/B      2. Incontinence of feces, unspecified fecal incontinence type  Diapers & Supplies MISC      3. Urinary incontinence without sensory awareness  Diapers & Supplies MISC      4. Muscular dystrophy ([address]  Diapers & Supplies MISC    Nutritional Supplements (HIGH-PROTEIN NUTRITIONAL SHAKE) LIQD            Plan:   More than 50% of the time was spent counseling and coordinating care for a total time of 30 min face to face. Begin Doxycycline twice daily for 10 days. Nebulizer every 4-6 hours. Mucinex as directed. Promethazine DM for cough. Go to ER for worsening symptoms. Chest xray today.      PDMP Monitoring:    Last PDMP Roxana Mcgregor as Reviewed:  Review User Review Instant Review Result            Urine Drug Screenings (1 yr)       POCT Rapid Drug Screen  Collected: 9/22/2020  1:20 PM (Final result)              POCT Rapid Drug Screen  Collected: 2/28/2020 (Final result)              POCT Rapid Drug Screen  Collected: 11/22/2019  2:15 PM (Final result)              POCT Rapid Drug Screen  Collected: 8/21/2019  1:20 PM (Final result)              POCT Rapid Drug Screen  Collected: 5/24/2019  2:30 PM (Final result)              Urine Drug Screen  Collected: 9/3/2017  8:34 PM (Final result)              Barbiturate, Urine, Confirmation  Resulted: 10/12/2020 (Final result) Medication Contract and Consent for Opioid Use Documents Filed       Patient Documents       Type of Document Status Date Received Received By Description    Medication Contract Signed 3/3/2017  3:35 PM Arley Hankins                      Patient given educational materials -see patient instructions. Discussed use, benefit, and side effects of prescribed medications. All patient questions answered. Pt voiced understanding. Reviewed health maintenance. Instructed to continue currentmedications, diet and exercise. Patient agreed with treatment plan. Follow up as directed. MEDICATIONS:  Orders Placed This Encounter   Medications    doxycycline hyclate (VIBRA-TABS) 100 MG tablet     Sig: Take 1 tablet by mouth 2 times daily for 10 days     Dispense:  20 tablet     Refill:  0    albuterol (PROVENTIL) (5 MG/ML) 0.5% nebulizer solution     Sig: Take 1 mL by nebulization 4 times daily as needed for Wheezing     Dispense:  120 each     Refill:  3    Diapers & Supplies MISC     Sig: XL pullups, wipes, bed pads, and gloves     Dispense:  120 each     Refill:  11    Nutritional Supplements (HIGH-PROTEIN NUTRITIONAL SHAKE) LIQD     Sig: Take one daily     Dispense:  30 each     Refill:  11    promethazine-dextromethorphan (PROMETHAZINE-DM) 6.25-15 MG/5ML syrup     Sig: Take 5 mLs by mouth 4 times daily as needed for Cough     Dispense:  180 mL     Refill:  0         ORDERS:  Orders Placed This Encounter   Procedures    XR CHEST STANDARD (2 VW)    POCT Influenza A/B    DME Order for Nebulizer as OP       Follow-up:  No follow-ups on file. PATIENT INSTRUCTIONS:  Patient Instructions   Begin Doxycycline twice daily for 10 days. Nebulizer every 4-6 hours. Mucinex as directed. Promethazine DM for cough. Go to ER for worsening symptoms. Chest xray today.    Electronically signed by SHANKAR Fonseca on 1/4/2023 at 3:40 PM    EMR Dragon/transcription disclaimer:  Much of thisencounter note is electronic transcription/translation of spoken language to printed texts. The electronic translation of spoken language may be erroneous, or at times, nonsensical words or phrases may be inadvertentlytranscribed.   Although I have reviewed the note for such errors, some may still exist.

## 2023-01-04 NOTE — TELEPHONE ENCOUNTER
[facility] called and said the 5 MG albuterol nebulizer treatment is out of stock. Loraine Gomes is asking 2.5 MG could be sent in for the patient?

## 2023-01-04 NOTE — PATIENT INSTRUCTIONS
Begin Doxycycline twice daily for 10 days. Nebulizer every 4-6 hours. Mucinex as directed. Promethazine DM for cough. Go to ER for worsening symptoms. Chest xray today.

## 2023-01-05 NOTE — RESULT ENCOUNTER NOTE
Please let patient know she does not have pneumonia; she shows some atelectasis in the left lower lung; she needs to be sure she is doing deep breathing to avoid getting pneumonia, though. Have her take the abx as directed. If she has incentive spirometer at home this will also be helpful. Follow-up if not improving!

## 2023-01-06 RX ORDER — DIPHENOXYLATE HYDROCHLORIDE AND ATROPINE SULFATE 2.5; .025 MG/1; MG/1
TABLET ORAL
Qty: 30 TABLET | Refills: 0 | OUTPATIENT
Start: 2023-01-06

## 2023-01-09 RX ORDER — DEXTROMETHORPHAN HYDROBROMIDE AND PROMETHAZINE HYDROCHLORIDE 15; 6.25 MG/5ML; MG/5ML
SYRUP ORAL
Qty: 180 ML | Refills: 0 | OUTPATIENT
Start: 2023-01-09

## 2023-01-16 ENCOUNTER — TELEPHONE (OUTPATIENT)
Dept: PRIMARY CARE CLINIC | Age: 58
End: 2023-01-16

## 2023-01-26 ENCOUNTER — TELEPHONE (OUTPATIENT)
Dept: INTERNAL MEDICINE | Age: 58
End: 2023-01-26

## 2023-02-03 ENCOUNTER — TELEPHONE (OUTPATIENT)
Dept: PRIMARY CARE CLINIC | Age: 58
End: 2023-02-03

## 2023-02-03 NOTE — TELEPHONE ENCOUNTER
S/w [address] [place] [address], hospitals [employer] has been having trouble getting an order for a new mattress. [address] [place] [address] would like to know if two orders can be made into hard copies; one for a mattress and one for meals through her insurance. She would like to pick these up at the office.

## 2023-02-06 DIAGNOSIS — G71.00 MUSCULAR DYSTROPHY (HCC): Primary | ICD-10-CM

## 2023-02-13 ENCOUNTER — TELEPHONE (OUTPATIENT)
Dept: OBGYN CLINIC | Age: 58
End: 2023-02-13

## 2023-02-13 NOTE — TELEPHONE ENCOUNTER
Esther Marcelino called to schedule a appointment to discuss PE tubes, and ear pain. Patient is a previous patient of Dr. Marco Hartman. [facility] was unable to accomodate. Please be advised that the best time to call her to accommodate their needs is  as soon as possible . Thank you.

## 2023-02-21 ENCOUNTER — OFFICE VISIT (OUTPATIENT)
Dept: ENT CLINIC | Age: 58
End: 2023-02-21
Payer: MEDICAID

## 2023-02-21 ENCOUNTER — PROCEDURE VISIT (OUTPATIENT)
Dept: ENT CLINIC | Age: 58
End: 2023-02-21
Payer: MEDICAID

## 2023-02-21 VITALS — SYSTOLIC BLOOD PRESSURE: 124 MMHG | DIASTOLIC BLOOD PRESSURE: 78 MMHG

## 2023-02-21 DIAGNOSIS — H69.82 CHRONIC DYSFUNCTION OF LEFT EUSTACHIAN TUBE: Primary | ICD-10-CM

## 2023-02-21 DIAGNOSIS — H69.82 DYSFUNCTION OF LEFT EUSTACHIAN TUBE: Primary | ICD-10-CM

## 2023-02-21 PROCEDURE — 92567 TYMPANOMETRY: CPT | Performed by: AUDIOLOGIST

## 2023-02-21 PROCEDURE — 99204 OFFICE O/P NEW MOD 45 MIN: CPT | Performed by: OTOLARYNGOLOGY

## 2023-02-21 ASSESSMENT — ENCOUNTER SYMPTOMS
GASTROINTESTINAL NEGATIVE: 1
RESPIRATORY NEGATIVE: 1
EYES NEGATIVE: 1
ALLERGIC/IMMUNOLOGIC NEGATIVE: 1

## 2023-02-21 NOTE — PROGRESS NOTES
Ms. Neha Rodarte presented to the clinic this date for a recheck of tympanometry due to complaints of left aural fullness. Type A tympanogram right consistent with normal TM mobility. Type B tympanogram left consistent with middle ear effusion.

## 2023-02-21 NOTE — PROGRESS NOTES
2023    Renato Payan (:  1965) is a 65 y.o. female, Established patient, here for evaluation of the following chief complaint(s):  New Patient (Left ear)      Vitals:    23 1322   BP: 124/78       Wt Readings from Last 3 Encounters:   06/15/22 193 lb (87.5 kg)   22 199 lb (90.3 kg)   21 205 lb 9.6 oz (93.3 kg)       BP Readings from Last 3 Encounters:   23 124/78   23 124/72   06/15/22 120/68         SUBJECTIVE/OBJECTIVE:    Patient seen today for eustachian tube dysfunction. She says she has had ear issues her entire life. As an adult is mainly been the left side. She previously had a tube on the left and that is now out. She since that time she had fluid in that ear. Hearing test today does demonstrate a type B tympanogram.  She was told before she had small eustachian tubes. Review of Systems   Constitutional: Negative. HENT:  Positive for hearing loss. Eyes: Negative. Respiratory: Negative. Cardiovascular: Negative. Gastrointestinal: Negative. Endocrine: Negative. Musculoskeletal: Negative. Skin: Negative. Allergic/Immunologic: Negative. Neurological: Negative. Hematological: Negative. Psychiatric/Behavioral: Negative. Physical Exam  Vitals reviewed. Constitutional:       Appearance: Normal appearance. She is normal weight. HENT:      Head: Normocephalic and atraumatic. Right Ear: Tympanic membrane, ear canal and external ear normal.      Left Ear: Ear canal and external ear normal. A middle ear effusion is present. Nose: Nose normal.      Mouth/Throat:      Mouth: Mucous membranes are moist.      Pharynx: Oropharynx is clear. Eyes:      Extraocular Movements: Extraocular movements intact. Pupils: Pupils are equal, round, and reactive to light. Cardiovascular:      Rate and Rhythm: Normal rate and regular rhythm.    Pulmonary:      Effort: Pulmonary effort is normal.      Breath sounds: Normal breath sounds. Musculoskeletal:      Cervical back: Normal range of motion. Skin:     General: Skin is warm and dry. Neurological:      General: No focal deficit present. Mental Status: She is alert and oriented to person, place, and time. Psychiatric:         Mood and Affect: Mood normal.         Behavior: Behavior normal.            ASSESSMENT/PLAN:    1. Chronic dysfunction of left eustachian tube  -     [place] SURG NASOPHARYNGOSCOPY DILAT EUSTACHIAN TUBE BI; Future  -     [place] TYMPANOSTOMY GENERAL ANESTHESIA; Future  Plan for trip to the OR to perform balloon dilation as well as put a tube in the left ear. Risk and benefits discussed and she would like to proceed. Return in about 10 weeks (around 5/2/2023) for postop. An electronic signature was used to authenticate this note. Vonda Guevara MD       Please note that this chart was generated using dragon dictation software. Although every effort was made to ensure the accuracy of this automated transcription, some errors in transcription may have occurred.

## 2023-03-23 ENCOUNTER — HOSPITAL ENCOUNTER (OUTPATIENT)
Dept: PREADMISSION TESTING | Age: 58
Discharge: HOME | End: 2023-03-27
Payer: MEDICAID

## 2023-03-23 VITALS — WEIGHT: 184 LBS | BODY MASS INDEX: 30.66 KG/M2 | HEIGHT: 65 IN

## 2023-03-23 LAB
ANION GAP SERPL CALCULATED.3IONS-SCNC: 11 MMOL/L (ref 7–19)
BASOPHILS # BLD: 0 K/UL (ref 0–0.2)
BASOPHILS NFR BLD: 0.3 % (ref 0–1)
BUN SERPL-MCNC: 8 MG/DL (ref 6–20)
CALCIUM SERPL-MCNC: 10 MG/DL (ref 8.6–10)
CHLORIDE SERPL-SCNC: 102 MMOL/L (ref 98–111)
CO2 SERPL-SCNC: 29 MMOL/L (ref 22–29)
CREAT SERPL-MCNC: 0.3 MG/DL (ref 0.5–0.9)
EOSINOPHIL # BLD: 0.1 K/UL (ref 0–0.6)
EOSINOPHIL NFR BLD: 1.2 % (ref 0–5)
ERYTHROCYTE [DISTWIDTH] IN BLOOD BY AUTOMATED COUNT: 14.4 % (ref 11.5–14.5)
GLUCOSE SERPL-MCNC: 86 MG/DL (ref 74–109)
HCT VFR BLD AUTO: 46.4 % (ref 37–47)
HGB BLD-MCNC: 14.6 G/DL (ref 12–16)
IMM GRANULOCYTES # BLD: 0 K/UL
LYMPHOCYTES # BLD: 2.9 K/UL (ref 1.1–4.5)
LYMPHOCYTES NFR BLD: 32.2 % (ref 20–40)
MCH RBC QN AUTO: 31 PG (ref 27–31)
MCHC RBC AUTO-ENTMCNC: 31.5 G/DL (ref 33–37)
MCV RBC AUTO: 98.5 FL (ref 81–99)
MONOCYTES # BLD: 0.6 K/UL (ref 0–0.9)
MONOCYTES NFR BLD: 6.2 % (ref 0–10)
NEUTROPHILS # BLD: 5.4 K/UL (ref 1.5–7.5)
NEUTS SEG NFR BLD: 59.7 % (ref 50–65)
PLATELET # BLD AUTO: 252 K/UL (ref 130–400)
PMV BLD AUTO: 9.7 FL (ref 9.4–12.3)
POTASSIUM SERPL-SCNC: 4.5 MMOL/L (ref 3.5–5)
RBC # BLD AUTO: 4.71 M/UL (ref 4.2–5.4)
SODIUM SERPL-SCNC: 142 MMOL/L (ref 136–145)
WBC # BLD AUTO: 9.1 K/UL (ref 4.8–10.8)

## 2023-03-23 PROCEDURE — 85025 COMPLETE CBC W/AUTO DIFF WBC: CPT

## 2023-03-23 PROCEDURE — 80048 BASIC METABOLIC PNL TOTAL CA: CPT

## 2023-03-23 PROCEDURE — 93005 ELECTROCARDIOGRAM TRACING: CPT | Performed by: ANESTHESIOLOGY

## 2023-03-23 NOTE — DISCHARGE INSTRUCTIONS
you do not receive a phone call by 4 PM the day before your surgery please call [phone] and let them know you have not received an arrival time. If your surgery is on Monday, your call will be on the Friday before your Monday surgery. MEDICATION INSTRUCTIONS PRIOR TO YOUR SURGERY      The morning of surgery: You can take all your usual prescribed medications with a small sip of water. DO NOT TAKE ANY DIABETIC MEDICATIONS the morning of your surgery. DO NOT TAKE ANY SUPPLEMENTS or over the counter medications the morning of  surgery. [address] [place] [address] Surgery Patients-Revised 6-    Visitors for surgery patients are essential for the patient's emotional well-being and care       post operatively. 2.   Visitor Expectations and Limitations         3. One visitor allowed with patients in the preop/postop rooms. 4.  A second visitor may sit in the waiting area. 5.  No children under 13 allowed in the pre-post op areas unless they are the patient. 6.  Two people may be with an underage surgical/procedural patient in preop/postop        room. 7.  If you are admitted to the hospital post operatively, there are NO RESTRICTIONS on       the floor at this time. 8.  If you are admitted to ICU postoperatively, you may have one visitor in the room from        7A-7P. A second visitor may sit in the ICU waiting room. No overnight visitors in         ICU waiting room.

## 2023-03-24 LAB
EKG P AXIS: -9 DEGREES
EKG P-R INTERVAL: 156 MS
EKG Q-T INTERVAL: 378 MS
EKG QRS DURATION: 102 MS
EKG QTC CALCULATION (BAZETT): 414 MS
EKG T AXIS: 24 DEGREES

## 2023-03-24 PROCEDURE — 93010 ELECTROCARDIOGRAM REPORT: CPT | Performed by: INTERNAL MEDICINE

## 2023-03-30 RX ORDER — OFLOXACIN 3 MG/ML
5 SOLUTION AURICULAR (OTIC) 2 TIMES DAILY
Qty: 10 ML | Refills: 0 | Status: SHIPPED | OUTPATIENT
Start: 2023-03-30 | End: 2023-04-09

## 2023-03-30 ASSESSMENT — ENCOUNTER SYMPTOMS
RESPIRATORY NEGATIVE: 1
EYES NEGATIVE: 1
GASTROINTESTINAL NEGATIVE: 1
ALLERGIC/IMMUNOLOGIC NEGATIVE: 1

## 2023-03-30 NOTE — H&P
Normal breath sounds. Musculoskeletal:      Cervical back: Normal range of motion. Skin:     General: Skin is warm and dry. Neurological:      General: No focal deficit present. Mental Status: She is alert and oriented to person, place, and time. Psychiatric:         Mood and Affect: Mood normal.         Behavior: Behavior normal.            ASSESSMENT/PLAN:    1. Chronic dysfunction of left eustachian tube  -     [place] SURG NASOPHARYNGOSCOPY DILAT EUSTACHIAN TUBE BI; Future  -     [place] TYMPANOSTOMY GENERAL ANESTHESIA; Future  Plan for trip to the OR to perform balloon dilation as well as put a tube in the left ear. Risk and benefits discussed and she would like to proceed. Return in about 10 weeks (around 5/2/2023) for postop. An electronic signature was used to authenticate this note. Jaky La MD       Please note that this chart was generated using dragon dictation software. Although every effort was made to ensure the accuracy of this automated transcription, some errors in transcription may have occurred.

## 2023-03-31 ENCOUNTER — HOSPITAL ENCOUNTER (OUTPATIENT)
Age: 58
Setting detail: OUTPATIENT SURGERY
Discharge: HOME | End: 2023-03-31
Attending: OTOLARYNGOLOGY | Admitting: OTOLARYNGOLOGY
Payer: MEDICAID

## 2023-03-31 ENCOUNTER — ANESTHESIA EVENT (OUTPATIENT)
Dept: OPERATING ROOM | Age: 58
End: 2023-03-31
Payer: MEDICAID

## 2023-03-31 ENCOUNTER — ANESTHESIA (OUTPATIENT)
Dept: OPERATING ROOM | Age: 58
End: 2023-03-31
Payer: MEDICAID

## 2023-03-31 VITALS
OXYGEN SATURATION: 97 % | WEIGHT: 184 LBS | BODY MASS INDEX: 30.66 KG/M2 | TEMPERATURE: 97.8 F | DIASTOLIC BLOOD PRESSURE: 89 MMHG | HEART RATE: 66 BPM | SYSTOLIC BLOOD PRESSURE: 160 MMHG | HEIGHT: 65 IN | RESPIRATION RATE: 18 BRPM

## 2023-03-31 DIAGNOSIS — G89.18 POSTOPERATIVE PAIN: Primary | ICD-10-CM

## 2023-03-31 PROCEDURE — 3700000000 HC ANESTHESIA ATTENDED CARE: Performed by: OTOLARYNGOLOGY

## 2023-03-31 PROCEDURE — 2580000003 HC RX 258: Performed by: ANESTHESIOLOGY

## 2023-03-31 PROCEDURE — 7100000010 HC PHASE II RECOVERY - FIRST 15 MIN: Performed by: OTOLARYNGOLOGY

## 2023-03-31 PROCEDURE — 2500000003 HC RX 250 WO HCPCS

## 2023-03-31 PROCEDURE — L8699 PROSTHETIC IMPLANT NOS: HCPCS | Performed by: OTOLARYNGOLOGY

## 2023-03-31 PROCEDURE — 7100000001 HC PACU RECOVERY - ADDTL 15 MIN: Performed by: OTOLARYNGOLOGY

## 2023-03-31 PROCEDURE — 6370000000 HC RX 637 (ALT 250 FOR IP): Performed by: OTOLARYNGOLOGY

## 2023-03-31 PROCEDURE — 7100000011 HC PHASE II RECOVERY - ADDTL 15 MIN: Performed by: OTOLARYNGOLOGY

## 2023-03-31 PROCEDURE — 6360000002 HC RX W HCPCS

## 2023-03-31 PROCEDURE — 3600000003 HC SURGERY LEVEL 3 BASE: Performed by: OTOLARYNGOLOGY

## 2023-03-31 PROCEDURE — 7100000000 HC PACU RECOVERY - FIRST 15 MIN: Performed by: OTOLARYNGOLOGY

## 2023-03-31 PROCEDURE — 3600000013 HC SURGERY LEVEL 3 ADDTL 15MIN: Performed by: OTOLARYNGOLOGY

## 2023-03-31 PROCEDURE — 2709999900 HC NON-CHARGEABLE SUPPLY: Performed by: OTOLARYNGOLOGY

## 2023-03-31 PROCEDURE — 3700000001 HC ADD 15 MINUTES (ANESTHESIA): Performed by: OTOLARYNGOLOGY

## 2023-03-31 PROCEDURE — C1726 CATH, BAL DIL, NON-VASCULAR: HCPCS | Performed by: OTOLARYNGOLOGY

## 2023-03-31 DEVICE — VENT TUBE 1010030 5PK ARMSTR GROMMET FLP
Type: IMPLANTABLE DEVICE | Site: EAR | Status: FUNCTIONAL
Brand: ARMSTRONG

## 2023-03-31 RX ORDER — SODIUM CHLORIDE 9 MG/ML
INJECTION, SOLUTION INTRAVENOUS PRN
Status: DISCONTINUED | OUTPATIENT
Start: 2023-03-31 | End: 2023-03-31 | Stop reason: HOSPADM

## 2023-03-31 RX ORDER — FENTANYL CITRATE 50 UG/ML
INJECTION, SOLUTION INTRAMUSCULAR; INTRAVENOUS PRN
Status: DISCONTINUED | OUTPATIENT
Start: 2023-03-31 | End: 2023-03-31 | Stop reason: SDUPTHER

## 2023-03-31 RX ORDER — HYDROMORPHONE HYDROCHLORIDE 1 MG/ML
0.5 INJECTION, SOLUTION INTRAMUSCULAR; INTRAVENOUS; SUBCUTANEOUS EVERY 5 MIN PRN
Status: DISCONTINUED | OUTPATIENT
Start: 2023-03-31 | End: 2023-03-31 | Stop reason: HOSPADM

## 2023-03-31 RX ORDER — OXYMETAZOLINE HYDROCHLORIDE 0.05 G/100ML
SPRAY NASAL PRN
Status: DISCONTINUED | OUTPATIENT
Start: 2023-03-31 | End: 2023-03-31 | Stop reason: ALTCHOICE

## 2023-03-31 RX ORDER — DEXAMETHASONE SODIUM PHOSPHATE 4 MG/ML
4 INJECTION, SOLUTION INTRA-ARTICULAR; INTRALESIONAL; INTRAMUSCULAR; INTRAVENOUS; SOFT TISSUE ONCE
Status: DISCONTINUED | OUTPATIENT
Start: 2023-03-31 | End: 2023-03-31 | Stop reason: HOSPADM

## 2023-03-31 RX ORDER — SODIUM CHLORIDE 0.9 % (FLUSH) 0.9 %
5-40 SYRINGE (ML) INJECTION PRN
Status: DISCONTINUED | OUTPATIENT
Start: 2023-03-31 | End: 2023-03-31 | Stop reason: HOSPADM

## 2023-03-31 RX ORDER — SODIUM CHLORIDE 0.9 % (FLUSH) 0.9 %
5-40 SYRINGE (ML) INJECTION EVERY 12 HOURS SCHEDULED
Status: DISCONTINUED | OUTPATIENT
Start: 2023-03-31 | End: 2023-03-31 | Stop reason: HOSPADM

## 2023-03-31 RX ORDER — SODIUM CHLORIDE, SODIUM LACTATE, POTASSIUM CHLORIDE, CALCIUM CHLORIDE 600; 310; 30; 20 MG/100ML; MG/100ML; MG/100ML; MG/100ML
INJECTION, SOLUTION INTRAVENOUS CONTINUOUS
Status: DISCONTINUED | OUTPATIENT
Start: 2023-03-31 | End: 2023-03-31 | Stop reason: HOSPADM

## 2023-03-31 RX ORDER — SODIUM CHLORIDE 0.9 % (FLUSH) 0.9 %
3 SYRINGE (ML) INJECTION PRN
Status: CANCELLED | OUTPATIENT
Start: 2023-03-31

## 2023-03-31 RX ORDER — ONDANSETRON 2 MG/ML
INJECTION INTRAMUSCULAR; INTRAVENOUS PRN
Status: DISCONTINUED | OUTPATIENT
Start: 2023-03-31 | End: 2023-03-31 | Stop reason: SDUPTHER

## 2023-03-31 RX ORDER — ONDANSETRON 2 MG/ML
4 INJECTION INTRAMUSCULAR; INTRAVENOUS
Status: DISCONTINUED | OUTPATIENT
Start: 2023-03-31 | End: 2023-03-31 | Stop reason: HOSPADM

## 2023-03-31 RX ORDER — HYDROCODONE BITARTRATE AND ACETAMINOPHEN 5; 325 MG/1; MG/1
1 TABLET ORAL EVERY 4 HOURS PRN
Qty: 18 TABLET | Refills: 0 | Status: SHIPPED | OUTPATIENT
Start: 2023-03-31 | End: 2023-04-03

## 2023-03-31 RX ORDER — DEXAMETHASONE SODIUM PHOSPHATE 10 MG/ML
INJECTION, SOLUTION INTRAMUSCULAR; INTRAVENOUS PRN
Status: DISCONTINUED | OUTPATIENT
Start: 2023-03-31 | End: 2023-03-31 | Stop reason: SDUPTHER

## 2023-03-31 RX ORDER — HYDROMORPHONE HYDROCHLORIDE 1 MG/ML
0.25 INJECTION, SOLUTION INTRAMUSCULAR; INTRAVENOUS; SUBCUTANEOUS EVERY 5 MIN PRN
Status: DISCONTINUED | OUTPATIENT
Start: 2023-03-31 | End: 2023-03-31 | Stop reason: HOSPADM

## 2023-03-31 RX ORDER — PROPOFOL 10 MG/ML
INJECTION, EMULSION INTRAVENOUS PRN
Status: DISCONTINUED | OUTPATIENT
Start: 2023-03-31 | End: 2023-03-31 | Stop reason: SDUPTHER

## 2023-03-31 RX ORDER — CIPROFLOXACIN AND DEXAMETHASONE 3; 1 MG/ML; MG/ML
SUSPENSION/ DROPS AURICULAR (OTIC) PRN
Status: DISCONTINUED | OUTPATIENT
Start: 2023-03-31 | End: 2023-03-31 | Stop reason: ALTCHOICE

## 2023-03-31 RX ORDER — LIDOCAINE HYDROCHLORIDE 10 MG/ML
INJECTION, SOLUTION EPIDURAL; INFILTRATION; INTRACAUDAL; PERINEURAL PRN
Status: DISCONTINUED | OUTPATIENT
Start: 2023-03-31 | End: 2023-03-31 | Stop reason: SDUPTHER

## 2023-03-31 RX ADMIN — PROPOFOL 120 MG: 10 INJECTION, EMULSION INTRAVENOUS at 14:18

## 2023-03-31 RX ADMIN — DEXAMETHASONE SODIUM PHOSPHATE 10 MG: 10 INJECTION, SOLUTION INTRAMUSCULAR; INTRAVENOUS at 14:25

## 2023-03-31 RX ADMIN — SODIUM CHLORIDE, SODIUM LACTATE, POTASSIUM CHLORIDE, AND CALCIUM CHLORIDE: 600; 310; 30; 20 INJECTION, SOLUTION INTRAVENOUS at 14:13

## 2023-03-31 RX ADMIN — FENTANYL CITRATE 25 MCG: 0.05 INJECTION, SOLUTION INTRAMUSCULAR; INTRAVENOUS at 14:37

## 2023-03-31 RX ADMIN — LIDOCAINE HYDROCHLORIDE 50 MG: 10 INJECTION, SOLUTION EPIDURAL; INFILTRATION; INTRACAUDAL; PERINEURAL at 14:18

## 2023-03-31 RX ADMIN — ONDANSETRON 4 MG: 2 INJECTION INTRAMUSCULAR; INTRAVENOUS at 14:25

## 2023-03-31 RX ADMIN — FENTANYL CITRATE 25 MCG: 0.05 INJECTION, SOLUTION INTRAMUSCULAR; INTRAVENOUS at 14:32

## 2023-03-31 RX ADMIN — PROPOFOL 50 MG: 10 INJECTION, EMULSION INTRAVENOUS at 14:25

## 2023-03-31 RX ADMIN — FENTANYL CITRATE 50 MCG: 0.05 INJECTION, SOLUTION INTRAMUSCULAR; INTRAVENOUS at 14:17

## 2023-03-31 ASSESSMENT — PAIN - FUNCTIONAL ASSESSMENT: PAIN_FUNCTIONAL_ASSESSMENT: NONE - DENIES PAIN

## 2023-03-31 ASSESSMENT — LIFESTYLE VARIABLES: SMOKING_STATUS: 1

## 2023-03-31 NOTE — OP NOTE
Operative Note      Patient: Issa Durbin  YOB: 1965  MRN: [id number]    Date of Procedure: 3/31/2023    Pre-Op Diagnosis: Chronic Eustachian tube dysfunction, left [H69.82]    Post-Op Diagnosis: Same       Procedure(s):  LEFT MYRINGOTOMY TUBE INSERTION  LEFT EUSTACHIAN BALLOON DILATION    Surgeon(s):  Calvin Ross MD    Assistant:   * No surgical staff found *    Anesthesia: General    Estimated Blood Loss (mL): Minimal    Complications: None    Specimens:   * No specimens in log *    Implants:  Implant Name Type Inv. Item Serial No.  Lot No. LRB No. Used Action   TUBE MYR DIA1. 14MM GRN BVL FLROPLAS MMT ARMSTR - [id number]  TUBE MYR DIA1. 14MM GRN BVL FLROPLAS GRMMT ARMSTR  [employer] [id number] Left 1 Implanted         Drains: * No LDAs found *    Findings: middle ear effusion on left    Detailed Description of Procedure: The patient was taken the operative room placed Table in supine position. At the induction of general LMA anesthesia the patient was prepped in standard fashion for ear tube and balloon dilation. Once a timeout was performed an Afrin-soaked pledget was placed in the left nare. The microscope was then used to examine the left ear. Middle ear effusion was noted with a small retraction pocket. Incision was made in the radial fashion in the anterior-inferior quadrant and the serous fluid was evacuated. A tube was atraumatically placed and drops were applied. Attention was then directed to the nose. The Afrin-soaked pledget was removed and a 30 degree endoscope was used to examine the nasal cavity. The open eustachian tube was identified and the balloon was inserted and inflated for 2 minutes. Once complete it was removed and the patient was returned to anesthesia having suffered no complications.     Electronically signed by Calvin Ross MD on 3/31/2023 at 2:44 PM

## 2023-03-31 NOTE — ANESTHESIA POSTPROCEDURE EVALUATION
Department of Anesthesiology  Postprocedure Note    Patient: Beatrice Medina  MRN: [id number]  YOB: 1965  Date of evaluation: 3/31/2023      Procedure Summary     Date: 03/31/23 Room / Location: [address]    Anesthesia Start: 5581 Anesthesia Stop:     Procedures:       LEFT MYRINGOTOMY TUBE INSERTION (Left)      LEFT EUSTACHIAN BALLOON DILATION (Left: Ear) Diagnosis:       Chronic Eustachian tube dysfunction, left      (Chronic Eustachian tube dysfunction, left [H69.82])    Surgeons: Bereket Senior MD Responsible Provider: SHANKAR Mc CRNA    Anesthesia Type: general ASA Status: 3          Anesthesia Type: No value filed.     Roxy Phase I: Roxy Score: 10    Roxy Phase II:        Anesthesia Post Evaluation    Patient location during evaluation: PACU  Patient participation: complete - patient participated  Level of consciousness: awake  Airway patency: patent  Nausea & Vomiting: no nausea and no vomiting  Cardiovascular status: hemodynamically stable  Respiratory status: spontaneous ventilation, nonlabored ventilation and room air  Hydration status: euvolemic  Comments: BP (!) 158/92   Pulse 64   Temp 97.6 °F (36.4 °C) (Temporal)   Resp 20   Ht 5' 5\" (1.651 m)   Wt 184 lb (83.5 kg)   LMP  (LMP Unknown)   SpO2 98%   BMI 30.62 kg/m²     Multimodal analgesia pain management approach

## 2023-03-31 NOTE — DISCHARGE INSTRUCTIONS
Please call Dr. Paulina Jeter with questions or concerns. Drops for the next 10 days in the left ear. Pain meds as needed. Follow-up in about a month.

## 2023-03-31 NOTE — INTERVAL H&P NOTE
Update History & Physical    The patient's History and Physical of March 3, 2023 was reviewed with the patient and I examined the patient. There was no change. The surgical site was confirmed by the patient and me. Plan: The risks, benefits, expected outcome, and alternative to the recommended procedure have been discussed with the patient. Patient understands and wants to proceed with the procedure.      Electronically signed by Corky Escudero MD on 3/31/2023 at 1:50 PM

## 2023-03-31 NOTE — BRIEF OP NOTE
Brief Postoperative Note      Patient: Theresa Stevens  YOB: 1965  MRN: [id number]    Date of Procedure: 3/31/2023    Pre-Op Diagnosis: Chronic Eustachian tube dysfunction, left [H69.82]    Post-Op Diagnosis: Same       Procedure(s):  LEFT MYRINGOTOMY TUBE INSERTION  LEFT EUSTACHIAN BALLOON DILATION    Surgeon(s):  Ana Law MD    Assistant:  * No surgical staff found *    Anesthesia: General    Estimated Blood Loss (mL): Minimal    Complications: None    Specimens:   * No specimens in log *    Implants:  Implant Name Type Inv. Item Serial No.  Lot No. LRB No. Used Action   TUBE MYR DIA1. 14MM GRN BVL FLROPLAS [facility] - [id number]  TUBE MYR DIA1. 14MM GRN BVL FLROPLAS GRMMT ARMSTR  [facility]- [id number] Left 1 Implanted         Drains: * No LDAs found *    Findings: middle ear effusion left, clear NP    Electronically signed by Ana Law MD on 3/31/2023 at 2:43 PM

## 2023-04-12 ENCOUNTER — TELEPHONE (OUTPATIENT)
Dept: PRIMARY CARE CLINIC | Age: 58
End: 2023-04-12

## 2023-05-02 ENCOUNTER — OFFICE VISIT (OUTPATIENT)
Dept: ENT CLINIC | Age: 58
End: 2023-05-02
Payer: MEDICAID

## 2023-05-02 ENCOUNTER — PROCEDURE VISIT (OUTPATIENT)
Dept: ENT CLINIC | Age: 58
End: 2023-05-02
Payer: MEDICAID

## 2023-05-02 VITALS — DIASTOLIC BLOOD PRESSURE: 84 MMHG | SYSTOLIC BLOOD PRESSURE: 132 MMHG

## 2023-05-02 DIAGNOSIS — H69.83 DYSFUNCTION OF BOTH EUSTACHIAN TUBES: Primary | ICD-10-CM

## 2023-05-02 DIAGNOSIS — H90.3 SENSORINEURAL HEARING LOSS (SNHL) OF BOTH EARS: ICD-10-CM

## 2023-05-02 DIAGNOSIS — J02.9 PHARYNGITIS, UNSPECIFIED ETIOLOGY: ICD-10-CM

## 2023-05-02 DIAGNOSIS — H91.8X9 ASYMMETRICAL HEARING LOSS: ICD-10-CM

## 2023-05-02 PROCEDURE — 92567 TYMPANOMETRY: CPT | Performed by: AUDIOLOGIST

## 2023-05-02 PROCEDURE — 99213 OFFICE O/P EST LOW 20 MIN: CPT | Performed by: OTOLARYNGOLOGY

## 2023-05-02 PROCEDURE — 92553 AUDIOMETRY AIR & BONE: CPT | Performed by: AUDIOLOGIST

## 2023-05-02 RX ORDER — AMOXICILLIN AND CLAVULANATE POTASSIUM 875; 125 MG/1; MG/1
1 TABLET, FILM COATED ORAL 2 TIMES DAILY
Qty: 20 TABLET | Refills: 0 | Status: SHIPPED | OUTPATIENT
Start: 2023-05-02 | End: 2023-05-12

## 2023-05-02 ASSESSMENT — ENCOUNTER SYMPTOMS
EYES NEGATIVE: 1
SORE THROAT: 1
ALLERGIC/IMMUNOLOGIC NEGATIVE: 1
RESPIRATORY NEGATIVE: 1
GASTROINTESTINAL NEGATIVE: 1

## 2023-05-02 NOTE — PROGRESS NOTES
History   Krystina Harris is a 63 y.o. female who presented to the clinic this date for a recheck of hearing following left PE tube placement and ET dilation. She reported continued aural fullness in the left ear and continued dizziness. Summary   Tympanometry consistent with negative middle ear pressure right and patent PE tube left. Pure tone testing indicates mild SNHL right and moderately severe to severe SNHL left. When compared to audiogram obtained in July 2021, thresholds remained essentially stable bilaterally. Results   Otoscopy:   Right: Clear EAC/Normal TM  Left: PE tube in TM    Audiometry:   Right: Mild sloping SNHL  Left:  Moderately severe to severe  sloping SNHL         Tympanometry:    Right: Type C  Left: Type B large volume    Plan   Results of today's testing were discussed with Ms. [employer] and the following recommendations were made: Follow up with ENT as scheduled. Monitor hearing yearly, sooner with changes. Hearing aid evaluation as desired. Hearing protection as warranted.         Audiogram and Acoustic Immittance

## 2023-05-02 NOTE — PROGRESS NOTES
2023    Opal Miller (:  1965) is a 63 y.o. female, Established patient, here for evaluation of the following chief complaint(s):  Post-Op Check (Left tube and balloon dilation)      Vitals:    23 1213   BP: 132/84       Wt Readings from Last 3 Encounters:   23 184 lb (83.5 kg)   23 184 lb (83.5 kg)   06/15/22 193 lb (87.5 kg)       BP Readings from Last 3 Encounters:   23 132/84   23 (!) 160/89   23 124/78         SUBJECTIVE/OBJECTIVE:    Patient seen today for her ears and her throat. I performed balloon dilation on the left about a month and a half ago. She reports the pressure is gone but she still feels imbalance and she was hoping that would help this. Hearing test today demonstrates sensorineural hearing loss left worse than right. She also planes of sore throat for the last few days. She feels like she is getting sick. Review of Systems   Constitutional: Negative. HENT:  Positive for hearing loss and sore throat. Eyes: Negative. Respiratory: Negative. Cardiovascular: Negative. Gastrointestinal: Negative. Endocrine: Negative. Musculoskeletal: Negative. Skin: Negative. Allergic/Immunologic: Negative. Neurological:  Positive for dizziness. Hematological: Negative. Psychiatric/Behavioral: Negative. Physical Exam  Vitals reviewed. Constitutional:       Appearance: Normal appearance. She is normal weight. HENT:      Head: Normocephalic and atraumatic. Right Ear: Tympanic membrane, ear canal and external ear normal.      Left Ear: Tympanic membrane, ear canal and external ear normal. A PE tube is present. Nose: Nose normal.      Mouth/Throat:      Mouth: Mucous membranes are moist.      Pharynx: Oropharynx is clear. Eyes:      Extraocular Movements: Extraocular movements intact. Pupils: Pupils are equal, round, and reactive to light.    Cardiovascular:      Rate and Rhythm: Normal rate and

## 2023-05-04 RX ORDER — BENZONATATE 100 MG/1
100 CAPSULE ORAL 3 TIMES DAILY PRN
Qty: 30 CAPSULE | Refills: 1 | Status: SHIPPED | OUTPATIENT
Start: 2023-05-04 | End: 2023-05-14

## 2023-05-05 ENCOUNTER — TELEPHONE (OUTPATIENT)
Dept: PRIMARY CARE CLINIC | Age: 58
End: 2023-05-05

## 2023-05-05 DIAGNOSIS — R15.9 INCONTINENCE OF FECES, UNSPECIFIED FECAL INCONTINENCE TYPE: Primary | ICD-10-CM

## 2023-05-05 RX ORDER — NYSTATIN 100000 [USP'U]/G
POWDER TOPICAL
Qty: 60 G | Refills: 0 | Status: SHIPPED | OUTPATIENT
Start: 2023-05-05

## 2023-05-05 NOTE — TELEPHONE ENCOUNTER
Opal Miller called to request a refill on her medication. Last office visit : 1/4/2023   Next office visit : 5/5/2023     Requested Prescriptions     Pending Prescriptions Disp Refills    nystatin [id number] UNIT/GM powder 60 g 0     Sig: APPLY THREE TIMES DAILY AS DIRECTED.             Aylin Duque

## 2023-05-09 ENCOUNTER — HOSPITAL ENCOUNTER (OUTPATIENT)
Dept: GENERAL RADIOLOGY | Age: 58
Discharge: HOME | End: 2023-05-09
Payer: MEDICAID

## 2023-05-09 ENCOUNTER — OFFICE VISIT (OUTPATIENT)
Dept: PRIMARY CARE CLINIC | Age: 58
End: 2023-05-09
Payer: MEDICAID

## 2023-05-09 ENCOUNTER — HOSPITAL ENCOUNTER (OUTPATIENT)
Dept: ULTRASOUND IMAGING | Age: 58
Discharge: HOME | End: 2023-05-09
Payer: MEDICAID

## 2023-05-09 VITALS
OXYGEN SATURATION: 96 % | HEIGHT: 65 IN | WEIGHT: 189 LBS | HEART RATE: 81 BPM | BODY MASS INDEX: 31.49 KG/M2 | TEMPERATURE: 96.9 F | DIASTOLIC BLOOD PRESSURE: 86 MMHG | SYSTOLIC BLOOD PRESSURE: 134 MMHG

## 2023-05-09 DIAGNOSIS — J06.9 ACUTE UPPER RESPIRATORY INFECTION: ICD-10-CM

## 2023-05-09 DIAGNOSIS — R22.1 NECK MASS: ICD-10-CM

## 2023-05-09 DIAGNOSIS — R22.1 KNOT IN THROAT: ICD-10-CM

## 2023-05-09 DIAGNOSIS — J06.9 ACUTE UPPER RESPIRATORY INFECTION: Primary | ICD-10-CM

## 2023-05-09 PROCEDURE — 99214 OFFICE O/P EST MOD 30 MIN: CPT | Performed by: NURSE PRACTITIONER

## 2023-05-09 PROCEDURE — 71045 X-RAY EXAM CHEST 1 VIEW: CPT | Performed by: RADIOLOGY

## 2023-05-09 PROCEDURE — 76536 US EXAM OF HEAD AND NECK: CPT

## 2023-05-09 PROCEDURE — 76536 US EXAM OF HEAD AND NECK: CPT | Performed by: RADIOLOGY

## 2023-05-09 PROCEDURE — 71045 X-RAY EXAM CHEST 1 VIEW: CPT

## 2023-05-09 RX ORDER — DIPHENOXYLATE HYDROCHLORIDE AND ATROPINE SULFATE 2.5; .025 MG/1; MG/1
TABLET ORAL
Qty: 30 TABLET | Refills: 0 | Status: SHIPPED | OUTPATIENT
Start: 2023-05-09 | End: 2023-06-08

## 2023-05-09 RX ORDER — DEXAMETHASONE SODIUM PHOSPHATE 10 MG/ML
10 INJECTION, EMULSION INTRAMUSCULAR; INTRAVENOUS ONCE
Status: COMPLETED | OUTPATIENT
Start: 2023-05-09 | End: 2023-05-09

## 2023-05-09 RX ORDER — DEXTROMETHORPHAN HYDROBROMIDE AND PROMETHAZINE HYDROCHLORIDE 15; 6.25 MG/5ML; MG/5ML
5 SYRUP ORAL 4 TIMES DAILY PRN
Qty: 180 ML | Refills: 0 | Status: SHIPPED | OUTPATIENT
Start: 2023-05-09 | End: 2023-05-16

## 2023-05-09 RX ORDER — DOXYCYCLINE HYCLATE 100 MG
100 TABLET ORAL 2 TIMES DAILY
Qty: 20 TABLET | Refills: 0 | Status: SHIPPED | OUTPATIENT
Start: 2023-05-09 | End: 2023-05-19

## 2023-05-09 RX ADMIN — DEXAMETHASONE SODIUM PHOSPHATE 10 MG: 10 INJECTION, EMULSION INTRAMUSCULAR; INTRAVENOUS at 15:48

## 2023-05-09 SDOH — ECONOMIC STABILITY: FOOD INSECURITY: WITHIN THE PAST 12 MONTHS, THE FOOD YOU BOUGHT JUST DIDN'T LAST AND YOU DIDN'T HAVE MONEY TO GET MORE.: NEVER TRUE

## 2023-05-09 SDOH — ECONOMIC STABILITY: INCOME INSECURITY: HOW HARD IS IT FOR YOU TO PAY FOR THE VERY BASICS LIKE FOOD, HOUSING, MEDICAL CARE, AND HEATING?: NOT HARD AT ALL

## 2023-05-09 SDOH — ECONOMIC STABILITY: HOUSING INSECURITY
IN THE LAST 12 MONTHS, WAS THERE A TIME WHEN YOU DID NOT HAVE A STEADY PLACE TO SLEEP OR SLEPT IN A SHELTER (INCLUDING NOW)?: NO

## 2023-05-09 SDOH — ECONOMIC STABILITY: FOOD INSECURITY: WITHIN THE PAST 12 MONTHS, YOU WORRIED THAT YOUR FOOD WOULD RUN OUT BEFORE YOU GOT MONEY TO BUY MORE.: NEVER TRUE

## 2023-05-09 ASSESSMENT — ENCOUNTER SYMPTOMS
RHINORRHEA: 0
SHORTNESS OF BREATH: 0
VOMITING: 0
COUGH: 1
NAUSEA: 0
COLOR CHANGE: 0
BACK PAIN: 0
VOICE CHANGE: 0
PHOTOPHOBIA: 0

## 2023-05-09 NOTE — PATIENT INSTRUCTIONS
Doxycycline as directed. Chest xray today. Steroid injection today. Mucinex as needed. Promethazine as directed for cough. Nebulizer treatment every 4-6 hours. Neck ultrasound- will call with appointment. Follow-up if symptoms fail to improve.

## 2023-05-09 NOTE — PROGRESS NOTES
[address] [facility] CARE  [address] [address] [id number] [address] [address]  Dept: [phone]  Dept Fax: [phone]  Loc: [phone]    Dina So is a 63 y.o. female who presents today for her medical conditions/complaints as noted below. Dina So is c/o of Follow-up (Pt has URI symptoms and was given tessalon pearls and abx. Stated she has been having diarrhea since starting abx, still has URI (cough, sneezing and runny nose, headache/congestion and body aches) ) and Cyst (Pt stated that she found a nodule on the right side of her neck. First felt yesterday)        HPI:     HPI   Chief Complaint   Patient presents with    Follow-up     Pt has URI symptoms and was given tessalon pearls and abx. Stated she has been having diarrhea since starting abx, still has URI (cough, sneezing and runny nose, headache/congestion and body aches)     Cyst     Pt stated that she found a nodule on the right side of her neck. First felt yesterday     Patient presents today for evaluation of cough and congestion. She was started on Augmentin last week per Dr Ashok Figueredo. She has been taking tessalon perles with little relief. She states she is having coughing spells that are productive. She denies fever. She has 2 days of abx remaining. She has been having diarrhea since starting this. She recently had left myringotomy and eustacian tube balloon dilation. She noticed a \"knot\" to right side of neck yesterday. She states it is tender to touch. She denies difficulty swallowing.        Past Medical History:   Diagnosis Date    Anxiety     Collapse of right lung 2022    in conjunction with uri    Depression     Hyperlipidemia     Muscular dystrophy ([address]     Urinary incontinence     Uterine cancer ([address]       Past Surgical History:   Procedure Laterality Date    ANKLE SURGERY Left     APPENDECTOMY      pt states possibly removed with rolanda     SECTION      CHOLECYSTECTOMY

## 2023-05-10 NOTE — RESULT ENCOUNTER NOTE
Please inform patient that ultrasound appears normal; the palpable area appears to be cartilage/bone. If this seems to increase in size she is to let us know. Thank you!

## 2023-08-24 ENCOUNTER — TELEPHONE (OUTPATIENT)
Dept: PRIMARY CARE CLINIC | Age: 58
End: 2023-08-24

## 2023-08-24 NOTE — TELEPHONE ENCOUNTER
I am seeing an order that I signed that was scanned into media on 6/23/23. Can you be sure this was faxed and received?

## 2023-08-24 NOTE — TELEPHONE ENCOUNTER
Caregiver was in office today with one of our patients. Pt stated she called a couple of weeks ago asking that a new order be sent for a Mattress. Caregiver stated it will be covered by insurance due to condition. Please send to [facility].  If any questions call Chad Pulliam at [phone].

## 2023-09-18 ENCOUNTER — OFFICE VISIT (OUTPATIENT)
Age: 58
End: 2023-09-18
Payer: MEDICAID

## 2023-09-18 VITALS
DIASTOLIC BLOOD PRESSURE: 72 MMHG | HEART RATE: 69 BPM | TEMPERATURE: 98.1 F | SYSTOLIC BLOOD PRESSURE: 122 MMHG | WEIGHT: 195 LBS | OXYGEN SATURATION: 99 % | BODY MASS INDEX: 32.45 KG/M2 | RESPIRATION RATE: 20 BRPM

## 2023-09-18 DIAGNOSIS — R05.1 ACUTE COUGH: ICD-10-CM

## 2023-09-18 DIAGNOSIS — Z11.52 ENCOUNTER FOR SCREENING FOR COVID-19: ICD-10-CM

## 2023-09-18 DIAGNOSIS — H69.83 DYSFUNCTION OF BOTH EUSTACHIAN TUBES: Primary | ICD-10-CM

## 2023-09-18 DIAGNOSIS — R09.81 SINUS CONGESTION: ICD-10-CM

## 2023-09-18 PROCEDURE — 99213 OFFICE O/P EST LOW 20 MIN: CPT

## 2023-09-18 ASSESSMENT — ENCOUNTER SYMPTOMS
EYE PAIN: 0
EYE REDNESS: 0
NAUSEA: 0
STRIDOR: 0
APNEA: 0
EYE DISCHARGE: 0
CHEST TIGHTNESS: 0
TROUBLE SWALLOWING: 0
CHOKING: 0
COLOR CHANGE: 0
ABDOMINAL PAIN: 0
SORE THROAT: 0
DIARRHEA: 0
CONSTIPATION: 0
VOMITING: 0
WHEEZING: 0
SINUS PRESSURE: 0
ABDOMINAL DISTENTION: 0
COUGH: 1
SHORTNESS OF BREATH: 0
FACIAL SWELLING: 0
SINUS PAIN: 0

## 2023-09-19 ENCOUNTER — TELEPHONE (OUTPATIENT)
Dept: GASTROENTEROLOGY | Facility: CLINIC | Age: 58
End: 2023-09-19
Payer: MEDICAID

## 2023-09-19 LAB — SARS-COV-2 N GENE RESP QL NAA+PROBE: NOT DETECTED

## 2023-09-19 NOTE — PATIENT INSTRUCTIONS
Covid test sent to lab; will call with results. Recommended OTC flonase    Recommended OTC claritin or zyrtec    The patient is to follow up with PCP or return to clinic if symptoms worsen/fail to improve.

## 2023-09-19 NOTE — TELEPHONE ENCOUNTER
SPOKE WITH PT ABOUT BEING DUE FOR A REPEAT ENDOSCOPY. THEY ARE NOT READY TO MAKE AN APPT. AT THIS TIME.     LETTER SENT TO PCP.

## 2023-11-01 ENCOUNTER — OFFICE VISIT (OUTPATIENT)
Dept: GASTROENTEROLOGY | Facility: CLINIC | Age: 58
End: 2023-11-01
Payer: MEDICAID

## 2023-11-01 VITALS
OXYGEN SATURATION: 98 % | HEART RATE: 97 BPM | HEIGHT: 65 IN | DIASTOLIC BLOOD PRESSURE: 80 MMHG | WEIGHT: 200 LBS | SYSTOLIC BLOOD PRESSURE: 130 MMHG | BODY MASS INDEX: 33.32 KG/M2 | TEMPERATURE: 97.5 F

## 2023-11-01 DIAGNOSIS — D13.2 ADENOMATOUS DUODENAL POLYP: Primary | ICD-10-CM

## 2023-11-01 RX ORDER — NITROFURANTOIN 25; 75 MG/1; MG/1
CAPSULE ORAL EVERY 12 HOURS SCHEDULED
COMMUNITY
Start: 2023-10-30 | End: 2023-11-05

## 2023-11-01 NOTE — PROGRESS NOTES
[facility] GASTROENTEROLOGY - OFFICE NOTE    2023    Valencia Fonseca   1965    Primary Physician: Raina Reynolds APRN    Chief Complaint   Patient presents with    Endoscopy   History of duodenal polyp       HISTORY OF PRESENT ILLNESS:     Valencia Fonseca is a 58 y.o. female presents  with history of adenomatous duodenal polyp.   No nausea, vomiting, or weight loss. No abdominal pain.         UPPER GI ENDOSCOPY (10/08/2020 12:13) duodenal polyp not retrieved, recall 3 years.     History of adenomatous duodenal polyps.       She is scheduled for ultrasound tomorrow at [facility] for eval of tenderness at the xiphoid process.        Past Medical History:   Diagnosis Date    Cancer     uterine    Depression     Dizziness     History of pancreatitis     15 YRS AGO    History of uterine cancer     Hyperlipemia     Incontinence of urine     Muscular dystrophy     Myotonic dystrophy     Ptosis, both eyelids     Sleep apnea     NO MACHINE       Past Surgical History:   Procedure Laterality Date    ANKLE SURGERY Right     APPENDECTOMY      CATARACT EXTRACTION Bilateral     X2     SECTION      CHOLECYSTECTOMY      CLOSED REDUCTION METATARSAL FRACTURE      COLONOSCOPY  2009    COLONOSCOPY N/A 2017    Procedure: COLONOSCOPY WITH ANESTHESIA;  Surgeon: Hunter Coats MD;  Location: [facility] ENDOSCOPY;  Service:     COLONOSCOPY N/A 2022    Procedure: COLONOSCOPY WITH ANESTHESIA;  Surgeon: Hunter Coats MD;  Location: [facility] ENDOSCOPY;  Service: Gastroenterology;  Laterality: N/A;  pre  post polyp  DrLarry    ENDOSCOPY  2014    ENDOSCOPY N/A 2017    Procedure: ESOPHAGOGASTRODUODENOSCOPY WITH ANESTHESIA;  Surgeon: Hunter Coats MD;  Location: [facility] ENDOSCOPY;  Service:     ENDOSCOPY N/A 10/8/2020    Procedure: ESOPHAGOGASTRODUODENOSCOPY WITH ANESTHESIA;  Surgeon: Hunter Coats MD;  Location: [facility] ENDOSCOPY;  Service: Gastroenterology;  Laterality: N/A;  pre:  dysphagia.  post: duodenal polyp. esophgus dilated.  zia dugan     EYE PTOSIS REPAIR      FOOT SURGERY Left     FRONTALIS SUSPENSION Bilateral 8/31/2017    Procedure: BILATERAL UPPER LID FRONTALIS SUSPENSION WITH FASCIA;  Surgeon: Julius Hernandez MD;  Location: [facility];  Service:     HYSTERECTOMY      NECK SURGERY         Outpatient Medications Marked as Taking for the 11/1/23 encounter (Office Visit) with Kiah Jackson APRN   Medication Sig Dispense Refill    Diapers & Supplies misc pullups wipes and bed pads. One month supply-patient needs appointment      diphenoxylate-atropine (LOMOTIL) 2.5-0.025 MG per tablet Take  by mouth As Needed.      guaiFENesin (MUCINEX) 600 MG 12 hr tablet Take 2 tablets by mouth 2 (Two) Times a Day.      nitrofurantoin, macrocrystal-monohydrate, (MACROBID) 100 MG capsule Every 12 (Twelve) Hours.      nystatin (MYCOSTATIN) 144820 UNIT/GM powder Apply  topically to the appropriate area as directed 3 (Three) Times a Day. For 14 days 30 g 0       Allergies   Allergen Reactions    Erythromycin Other (See Comments)     Eye antibiotic ointment-reaction to the eyes swollen-daughter mentioned and is swollen noted.    Food Diarrhea and Nausea And Vomiting     Allergic to all types of peppers    Neosporin [Neomycin-Bacitracin Zn-Polymyx] Hives and Itching    Percocet [Oxycodone-Acetaminophen] Itching    Percodan [Oxycodone-Aspirin] Itching    Tramadol Itching       Social History     Socioeconomic History    Marital status:    Tobacco Use    Smoking status: Every Day     Packs/day: 0.50     Years: 30.00     Additional pack years: 0.00     Total pack years: 15.00     Types: Cigarettes    Smokeless tobacco: Never   Vaping Use    Vaping Use: Never used   Substance and Sexual Activity    Alcohol use: Yes     Comment: Rarely-2 or 3 times a year    Drug use: Yes     Types: Marijuana    Sexual activity: Defer       Family History   Problem Relation Age of Onset    Colon cancer  "Maternal Aunt     Colon polyps Neg Hx     Malig Hyperthermia Neg Hx        Review of Systems   Constitutional:  Negative for chills, fever and unexpected weight change.   Respiratory:  Negative for shortness of breath.    Cardiovascular:  Negative for chest pain.   Gastrointestinal:  Negative for abdominal distention, abdominal pain, anal bleeding, blood in stool, constipation, diarrhea, nausea and vomiting.        Vitals:    11/01/23 0920   BP: 130/80   Pulse: 97   Temp: 97.5 °F (36.4 °C)   SpO2: 98%   Weight: 90.7 kg (200 lb)   Height: 165.1 cm (65\")      Body mass index is 33.28 kg/m².    Physical Exam  Vitals reviewed.   Constitutional:       General: She is not in acute distress.  Cardiovascular:      Rate and Rhythm: Normal rate and regular rhythm.      Heart sounds: Normal heart sounds.   Pulmonary:      Effort: Pulmonary effort is normal.      Breath sounds: Normal breath sounds.   Abdominal:      General: Bowel sounds are normal. There is no distension.      Palpations: Abdomen is soft.      Tenderness: There is no abdominal tenderness.   Musculoskeletal:      Comments: Tenderness just at the xiphoid process and to the right rib area. She reports this has been tender for 1 year.    Skin:     General: Skin is warm and dry.   Neurological:      Mental Status: She is alert.                 ASSESSMENT AND PLAN    Assessment & Plan     Diagnoses and all orders for this visit:    1. Adenomatous duodenal polyp (Primary)  Comments:  history of.  Orders:  -     Case Request; Standing  -     Case Request    Other orders  -     Implement Anesthesia Orders Day of Procedure; Standing  -     Obtain Informed Consent; Standing      Schedule egd.        ESOPHAGOGASTRODUODENOSCOPY WITH ANESTHESIA (N/A)  Risk, benefits, and alternatives of endoscopy were explained in full.  They understand that there is a risk of bleeding, perforation, and infection.  The risk of perforation goes up with esophageal dilation.  Other options to " evaluate UGI complaints could involve barium swallow or UGI series, but these would be diagnostic tests only.  Patient was given time to ask questions.  I answered them to their satisfaction and they are agreeable to proceeding         No follow-ups on file.          There are no Patient Instructions on file for this visit.      Kiah Jackson, APRN

## 2023-11-10 ENCOUNTER — TELEMEDICINE (OUTPATIENT)
Dept: PRIMARY CARE CLINIC | Age: 58
End: 2023-11-10
Payer: MEDICAID

## 2023-11-10 DIAGNOSIS — N39.42 URINARY INCONTINENCE WITHOUT SENSORY AWARENESS: ICD-10-CM

## 2023-11-10 DIAGNOSIS — G71.00 MUSCULAR DYSTROPHY (HCC): Primary | ICD-10-CM

## 2023-11-10 DIAGNOSIS — R19.7 DIARRHEA, UNSPECIFIED TYPE: ICD-10-CM

## 2023-11-10 PROCEDURE — 99213 OFFICE O/P EST LOW 20 MIN: CPT | Performed by: NURSE PRACTITIONER

## 2023-11-10 RX ORDER — DIPHENOXYLATE HYDROCHLORIDE AND ATROPINE SULFATE 2.5; .025 MG/1; MG/1
1 TABLET ORAL 4 TIMES DAILY PRN
Qty: 30 TABLET | Refills: 0 | Status: SHIPPED | OUTPATIENT
Start: 2023-11-10 | End: 2023-11-20

## 2023-11-10 ASSESSMENT — ENCOUNTER SYMPTOMS
VOMITING: 0
COLOR CHANGE: 0
BACK PAIN: 0
COUGH: 0
NAUSEA: 0
PHOTOPHOBIA: 0
VOICE CHANGE: 0
RHINORRHEA: 0
SHORTNESS OF BREATH: 0
DIARRHEA: 1

## 2023-11-10 NOTE — PROGRESS NOTES
Brenda Torres, was evaluated through a synchronous (real-time) audio-video encounter. The patient (or guardian if applicable) is aware that this is a billable service, which includes applicable co-pays. This Virtual Visit was conducted with patient's (and/or legal guardian's) consent. Patient identification was verified, and a caregiver was present when appropriate. The patient was located at Home: [facility], [facility] [address]  Provider was located at Home ([address] [facility]): [address] [place] [address] (:  1965) is a Established patient, presenting virtually for evaluation of the following:    Assessment & Plan   Below is the assessment and plan developed based on review of pertinent history, physical exam, labs, studies, and medications. 1. Muscular dystrophy ([address])        -  New order for hospital bed to be sent to [facility]. She will require this for lifetime. 2. Diarrhea, unspecified type  -     diphenoxylate-atropine (LOMOTIL) 2.5-0.025 MG per tablet; Take 1 tablet by mouth 4 times daily as needed for Diarrhea for up to 10 days. Max Daily Amount: 4 tablets, Disp-30 tablet, R-0Normal  3. Urinary incontinence without sensory awareness    No follow-ups on file. Subjective   Patient presents today to discuss need for new hospital bed. She has muscular dystrophy is wheelchair bound; her hospital bed is 9years old and is malfunctioning/remote has broken. She would like order sent to [facility].     She also uses lomotil prn for diarrhea and needs a refill. Review of Systems   Constitutional:  Negative for chills and fever. HENT:  Negative for ear pain, hearing loss, rhinorrhea and voice change. Eyes:  Negative for photophobia and visual disturbance. Respiratory:  Negative for cough and shortness of breath. Cardiovascular:  Negative for chest pain and palpitations. Gastrointestinal:  Positive for diarrhea. Negative for nausea and vomiting. Endocrine: Negative.

## 2023-11-14 ENCOUNTER — OFFICE VISIT (OUTPATIENT)
Dept: ENT CLINIC | Age: 58
End: 2023-11-14
Payer: MEDICAID

## 2023-11-14 VITALS — SYSTOLIC BLOOD PRESSURE: 118 MMHG | DIASTOLIC BLOOD PRESSURE: 72 MMHG | HEIGHT: 65 IN | BODY MASS INDEX: 32.45 KG/M2

## 2023-11-14 DIAGNOSIS — G71.00 MUSCULAR DYSTROPHY (HCC): Primary | ICD-10-CM

## 2023-11-14 DIAGNOSIS — R13.14 PHARYNGOESOPHAGEAL DYSPHAGIA: ICD-10-CM

## 2023-11-14 DIAGNOSIS — H90.3 SENSORINEURAL HEARING LOSS (SNHL) OF BOTH EARS: ICD-10-CM

## 2023-11-14 DIAGNOSIS — R26.89 BALANCE DISORDER: ICD-10-CM

## 2023-11-14 DIAGNOSIS — H69.92 DYSFUNCTION OF LEFT EUSTACHIAN TUBE: Primary | ICD-10-CM

## 2023-11-14 PROCEDURE — 99214 OFFICE O/P EST MOD 30 MIN: CPT | Performed by: OTOLARYNGOLOGY

## 2023-11-14 RX ORDER — PSEUDOEPHEDRINE HCL 120 MG/1
120 TABLET, FILM COATED, EXTENDED RELEASE ORAL EVERY 12 HOURS
Qty: 14 TABLET | Refills: 0 | Status: SHIPPED | OUTPATIENT
Start: 2023-11-14 | End: 2023-11-21

## 2023-11-14 RX ORDER — PREDNISONE 20 MG/1
TABLET ORAL
Qty: 21 TABLET | Refills: 0 | Status: SHIPPED | OUTPATIENT
Start: 2023-11-14

## 2023-11-14 ASSESSMENT — ENCOUNTER SYMPTOMS
GASTROINTESTINAL NEGATIVE: 1
EYES NEGATIVE: 1
TROUBLE SWALLOWING: 1
RESPIRATORY NEGATIVE: 1
ALLERGIC/IMMUNOLOGIC NEGATIVE: 1

## 2023-11-30 ENCOUNTER — TELEPHONE (OUTPATIENT)
Dept: GASTROENTEROLOGY | Facility: CLINIC | Age: 58
End: 2023-11-30
Payer: MEDICAID

## 2023-11-30 NOTE — TELEPHONE ENCOUNTER
Pts caregiver Shavon asked if Dr Coats can review Dr aFrmer office visit from the 14th prior to the pt having upcoming endoscopy.

## 2023-12-08 ENCOUNTER — ANESTHESIA (OUTPATIENT)
Dept: GASTROENTEROLOGY | Facility: HOSPITAL | Age: 58
End: 2023-12-08
Payer: MEDICAID

## 2023-12-08 ENCOUNTER — HOSPITAL ENCOUNTER (OUTPATIENT)
Facility: HOSPITAL | Age: 58
Setting detail: HOSPITAL OUTPATIENT SURGERY
Discharge: HOME | End: 2023-12-08
Attending: INTERNAL MEDICINE | Admitting: INTERNAL MEDICINE
Payer: MEDICAID

## 2023-12-08 ENCOUNTER — ANESTHESIA EVENT (OUTPATIENT)
Dept: GASTROENTEROLOGY | Facility: HOSPITAL | Age: 58
End: 2023-12-08
Payer: MEDICAID

## 2023-12-08 VITALS
BODY MASS INDEX: 31.99 KG/M2 | TEMPERATURE: 96 F | OXYGEN SATURATION: 96 % | HEIGHT: 65 IN | SYSTOLIC BLOOD PRESSURE: 119 MMHG | HEART RATE: 74 BPM | DIASTOLIC BLOOD PRESSURE: 67 MMHG | WEIGHT: 192 LBS | RESPIRATION RATE: 17 BRPM

## 2023-12-08 DIAGNOSIS — D13.2 ADENOMATOUS DUODENAL POLYP: ICD-10-CM

## 2023-12-08 PROCEDURE — 25810000003 SODIUM CHLORIDE 0.9 % SOLUTION: Performed by: ANESTHESIOLOGY

## 2023-12-08 PROCEDURE — 88305 TISSUE EXAM BY PATHOLOGIST: CPT | Performed by: INTERNAL MEDICINE

## 2023-12-08 PROCEDURE — 25010000002 PROPOFOL 10 MG/ML EMULSION: Performed by: NURSE ANESTHETIST, CERTIFIED REGISTERED

## 2023-12-08 RX ORDER — ONDANSETRON 2 MG/ML
4 INJECTION INTRAMUSCULAR; INTRAVENOUS ONCE AS NEEDED
Status: DISCONTINUED | OUTPATIENT
Start: 2023-12-08 | End: 2023-12-08 | Stop reason: HOSPADM

## 2023-12-08 RX ORDER — LIDOCAINE HYDROCHLORIDE 20 MG/ML
INJECTION, SOLUTION EPIDURAL; INFILTRATION; INTRACAUDAL; PERINEURAL AS NEEDED
Status: DISCONTINUED | OUTPATIENT
Start: 2023-12-08 | End: 2023-12-08 | Stop reason: SURG

## 2023-12-08 RX ORDER — PROPOFOL 10 MG/ML
VIAL (ML) INTRAVENOUS AS NEEDED
Status: DISCONTINUED | OUTPATIENT
Start: 2023-12-08 | End: 2023-12-08 | Stop reason: SURG

## 2023-12-08 RX ORDER — SODIUM CHLORIDE 9 MG/ML
500 INJECTION, SOLUTION INTRAVENOUS CONTINUOUS PRN
Status: DISCONTINUED | OUTPATIENT
Start: 2023-12-08 | End: 2023-12-08 | Stop reason: HOSPADM

## 2023-12-08 RX ORDER — SODIUM CHLORIDE 0.9 % (FLUSH) 0.9 %
10 SYRINGE (ML) INJECTION AS NEEDED
Status: DISCONTINUED | OUTPATIENT
Start: 2023-12-08 | End: 2023-12-08 | Stop reason: HOSPADM

## 2023-12-08 RX ADMIN — LIDOCAINE HYDROCHLORIDE 40 MG: 20 INJECTION, SOLUTION EPIDURAL; INFILTRATION; INTRACAUDAL; PERINEURAL at 11:15

## 2023-12-08 RX ADMIN — SODIUM CHLORIDE 500 ML: 9 INJECTION, SOLUTION INTRAVENOUS at 10:44

## 2023-12-08 RX ADMIN — PROPOFOL INJECTABLE EMULSION 230 MG: 10 INJECTION, EMULSION INTRAVENOUS at 11:15

## 2023-12-08 NOTE — ANESTHESIA POSTPROCEDURE EVALUATION
Patient: Valencia Fonseca    Procedure Summary       Date: 12/08/23 Room / Location: [facility] ENDOSCOPY 4 /  [facility]    Anesthesia Start: 1111 Anesthesia Stop: 1135    Procedure: ESOPHAGOGASTRODUODENOSCOPY WITH ANESTHESIA Diagnosis:       Adenomatous duodenal polyp      (Adenomatous duodenal polyp [D13.2])    Surgeons: Hunter Coats MD Provider: Gerry Austin CRNA    Anesthesia Type: MAC ASA Status: 3            Anesthesia Type: MAC    Vitals  Vitals Value Taken Time   /67 12/08/23 1151   Temp     Pulse 77 12/08/23 1153   Resp 18 12/08/23 1145   SpO2 99 % 12/08/23 1153   Vitals shown include unfiled device data.        Post Anesthesia Care and Evaluation    Patient location during evaluation: PHASE II  Level of consciousness: awake  Pain management: adequate    Airway patency: patent  Anesthetic complications: No anesthetic complications  PONV Status: none  Cardiovascular status: acceptable  Respiratory status: acceptable  Hydration status: acceptable

## 2023-12-08 NOTE — H&P
[facility] Gastroenterology  Pre Procedure History & Physical    Chief Complaint:   History of adenomatous duodenal polyps    Subjective     HPI:   She has a history of adenomatous duodenal polyps.  She presents for 3-year surveillance exam.  She does note that she is having little bit of dysphagia.  She states oropharyngeal dysphagia sometimes she will have to cough up the pieces of food.  I did note that ENT is planning a modified barium swallow.  She also noted that she feels a small lesion that she points to her xiphoid process.  When I felt the area it was her xiphoid process she was feeling.  I informed her what this was that she was pointing to today.    Past Medical History:   Past Medical History:   Diagnosis Date    Cancer     uterine    Depression     Dizziness     History of pancreatitis     15 YRS AGO    History of uterine cancer     Hyperlipemia     Incontinence of urine     Muscular dystrophy     Myotonic dystrophy     Ptosis, both eyelids     Sleep apnea     NO MACHINE       Past Surgical History:  Past Surgical History:   Procedure Laterality Date    ANKLE SURGERY Right     APPENDECTOMY      CATARACT EXTRACTION Bilateral     X2     SECTION      CHOLECYSTECTOMY      CLOSED REDUCTION METATARSAL FRACTURE      COLONOSCOPY  2009    COLONOSCOPY N/A 2017    Procedure: COLONOSCOPY WITH ANESTHESIA;  Surgeon: Hunter Coats MD;  Location: [facility] ENDOSCOPY;  Service:     COLONOSCOPY N/A 2022    Procedure: COLONOSCOPY WITH ANESTHESIA;  Surgeon: Hunter Coats MD;  Location: [facility] ENDOSCOPY;  Service: Gastroenterology;  Laterality: N/A;  pre  post polyp  Dr.    ENDOSCOPY  2014    ENDOSCOPY N/A 2017    Procedure: ESOPHAGOGASTRODUODENOSCOPY WITH ANESTHESIA;  Surgeon: Hunter Coats MD;  Location: [facility] ENDOSCOPY;  Service:     ENDOSCOPY N/A 10/8/2020    Procedure: ESOPHAGOGASTRODUODENOSCOPY WITH ANESTHESIA;  Surgeon: Hunter Coats MD;  Location: [facility]  "ENDOSCOPY;  Service: Gastroenterology;  Laterality: N/A;  pre: dysphagia.  post: duodenal polyp. esophgus dilated.  zia dugan     EYE PTOSIS REPAIR      FOOT SURGERY Left     FRONTALIS SUSPENSION Bilateral 8/31/2017    Procedure: BILATERAL UPPER LID FRONTALIS SUSPENSION WITH FASCIA;  Surgeon: Julius Hernandez MD;  [facility] [place] [facility]  Service:     HYSTERECTOMY      NECK SURGERY          Family History:  Family History   Problem Relation Age of Onset    Colon cancer Maternal Aunt     Colon polyps Neg Hx     Malig Hyperthermia Neg Hx        Social History:   reports that she has been smoking cigarettes. She has a 15.00 pack-year smoking history. She has never used smokeless tobacco. She reports current alcohol use. She reports current drug use. Drug: Marijuana.    Medications:   Prior to Admission medications    Medication Sig Start Date End Date Taking? Authorizing Provider   diphenoxylate-atropine (LOMOTIL) 2.5-0.025 MG per tablet Take  by mouth As Needed. 4/27/17  Yes ProviderVirgilio MD   Diapers & Supplies misc pullups wipes and bed pads. One month supply-patient needs appointment 3/3/17   ProviderVirgilio MD   guaiFENesin (MUCINEX) 600 MG 12 hr tablet Take 2 tablets by mouth 2 (Two) Times a Day.    Provider, MD Virgilio   nystatin (MYCOSTATIN) 227720 UNIT/GM powder Apply  topically to the appropriate area as directed 3 (Three) Times a Day. For 14 days 7/24/19   Gaurang Parmar MD       Allergies:  Erythromycin, Food, Neosporin [neomycin-bacitracin zn-polymyx], Percocet [oxycodone-acetaminophen], Percodan [oxycodone-aspirin], and Tramadol    ROS:    General: Weight stable  Respiratory: No SOA  Cardiovascular: No CP    Objective     Blood pressure 150/87, pulse 89, temperature 96 °F (35.6 °C), resp. rate 20, height 165.1 cm (65\"), weight 87.1 kg (192 lb), SpO2 98%, not currently breastfeeding.    Physical Exam   Constitutional: Pt is oriented to person, place, and in no distress. "   Cardiovascular: Normal rate, regular rhythm.    Pulmonary/Chest: Effort normal. No respiratory distress.    Abdominal: Nondistended.  Psychiatric: Mood, memory, affect and judgment appear normal.     Assessment & Plan     Diagnosis:  History of adenomatous duodenal polyps, dysphagia    Anticipated Surgical Procedure:  Endoscopy possible dilatation    The risks, benefits, and alternatives of this procedure have been discussed with the patient or the responsible party- the patient understands and agrees to proceed.    EMR Dragon/transcription disclaimer:  Much of this encounter note is electronic transcription/translation of spoken language to printed text.  The electronic translation of spoken language may be erroneous, or at times, nonsensical words or phrases may be inadvertently transcribed.  Although I have reviewed the note for such errors, some may still exist.

## 2023-12-08 NOTE — ANESTHESIA PREPROCEDURE EVALUATION
Anesthesia Evaluation     Patient summary reviewed   history of anesthetic complications:  prolonged sedation  NPO Solid Status: > 8 hours             Airway   Mallampati: II  Dental      Pulmonary    (+) a smoker,sleep apnea  Cardiovascular - negative cardio ROS        Neuro/Psych  (+) neuromuscular disease (muscular dystrophy)  GI/Hepatic/Renal/Endo    (+) obesity    Musculoskeletal     Abdominal    Substance History      OB/GYN          Other                    Anesthesia Plan    ASA 3     MAC       Anesthetic plan, risks, benefits, and alternatives have been provided, discussed and informed consent has been obtained with: patient.    CODE STATUS:

## 2023-12-11 LAB
LAB AP CASE REPORT: NORMAL
Lab: NORMAL
PATH REPORT.FINAL DX SPEC: NORMAL
PATH REPORT.GROSS SPEC: NORMAL

## 2024-02-02 NOTE — PROGRESS NOTES
Diarrhea      Diapers & Supplies MISC pullups wipes and bed pads. One month supply-patient needs appointment      sertraline (ZOLOFT) 100 MG tablet Take 1.5 tablets by mouth daily 135 tablet 1     No current facility-administered medications for this visit. Allergies   Allergen Reactions    Erythromycin      Eye antibiotic ointment-reaction to the eyes swollen-daughter mentioned and is swollen noted.  Neomycin-Bacitracin Zn-Polymyx     Neosporin [Bacitracin-Neomycin-Polymyxin]     Oxycodone-Acetaminophen Itching    Percocet [Oxycodone-Acetaminophen] Itching    Percodan [Oxycodone-Aspirin] Itching    Tramadol     Food Diarrhea and Nausea And Vomiting     Allergic to all types of peppers       Family History   Problem Relation Age of Onset    Heart Disease Mother     Diabetes Mother     Heart Disease Father     Diabetes Father     Cancer Maternal Grandmother     Heart Disease Paternal Grandfather     Heart Disease Sister          Subjective:      Review of Systems   Constitutional: Negative. HENT: Positive for ear pain (fullness). Eyes: Negative. Respiratory: Negative. Cardiovascular: Negative. Gastrointestinal: Negative. Endocrine: Negative. Genitourinary: Negative. Musculoskeletal: Positive for gait problem (chronic). Skin: Negative. Neurological: Positive for dizziness. Hematological: Negative. Psychiatric/Behavioral: Negative. Objective:     Physical Exam   HENT:   Left Ear: A middle ear effusion is present. Musculoskeletal:   Chronic musculoskeletal issues   Psychiatric: Her mood appears anxious. /70   Pulse 89   Temp 97.6 °F (36.4 °C)   Ht 5' 5.5\" (1.664 m)   Wt 204 lb (92.5 kg) Comment: could not stand to weigh  LMP  (LMP Unknown)   SpO2 97%   BMI 33.43 kg/m²     Assessment:     1. Vertigo  meclizine (ANTIVERT) 12.5 MG tablet   2. Marijuana use     3.  [facility]         No results found for this visit on [FreeTextEntry1] : Anthony is a 63-year-old male born August 23, 1960 last seen December 21, 2023.  He had severe left testicular pain with imaging that showed chronic ischemic infarction we ended up taking out the left testicle the pathology showed the vasculature is filled with clot.  He was doing well but he was complaining of fatigue though he could get good erections he did have a low libido.  We did blood test in December which showed low normal total and free testosterone with an elevated LH and we elected to repeat the labs to see what is happening.  Since I last saw him he is developing intermittent tenderness in the right testicle and wants to know if the flow issues he had on the left canal surface on the right.  Blood test were done January 9, 2024 at 9:20 AM Estradiol was 15 LH was up to 14.4 Sex hormone binding globulin was 22.8 Total testosterone was 210 ([phone] Bioavailable testosterone was 50 ( [Fatigue] : fatigue [3] : 3 [de-identified] : right testicle

## 2024-02-06 ENCOUNTER — OFFICE VISIT (OUTPATIENT)
Dept: PRIMARY CARE CLINIC | Age: 59
End: 2024-02-06
Payer: MEDICAID

## 2024-02-06 VITALS
HEIGHT: 65 IN | HEART RATE: 71 BPM | BODY MASS INDEX: 32.32 KG/M2 | DIASTOLIC BLOOD PRESSURE: 72 MMHG | OXYGEN SATURATION: 100 % | TEMPERATURE: 97.7 F | SYSTOLIC BLOOD PRESSURE: 126 MMHG | WEIGHT: 194 LBS

## 2024-02-06 DIAGNOSIS — R15.9 INCONTINENCE OF FECES, UNSPECIFIED FECAL INCONTINENCE TYPE: Primary | ICD-10-CM

## 2024-02-06 DIAGNOSIS — N39.42 URINARY INCONTINENCE WITHOUT SENSORY AWARENESS: ICD-10-CM

## 2024-02-06 DIAGNOSIS — Z12.31 ENCOUNTER FOR SCREENING MAMMOGRAM FOR MALIGNANT NEOPLASM OF BREAST: ICD-10-CM

## 2024-02-06 DIAGNOSIS — G71.00 MUSCULAR DYSTROPHY (HCC): ICD-10-CM

## 2024-02-06 DIAGNOSIS — Z00.00 ENCOUNTER FOR ANNUAL PHYSICAL EXAM: Primary | ICD-10-CM

## 2024-02-06 DIAGNOSIS — R15.9 INCONTINENCE OF FECES, UNSPECIFIED FECAL INCONTINENCE TYPE: ICD-10-CM

## 2024-02-06 PROCEDURE — 99396 PREV VISIT EST AGE 40-64: CPT | Performed by: NURSE PRACTITIONER

## 2024-02-06 RX ORDER — DIPHENOXYLATE HYDROCHLORIDE AND ATROPINE SULFATE 2.5; .025 MG/1; MG/1
1 TABLET ORAL 4 TIMES DAILY PRN
Qty: 30 TABLET | Refills: 0 | Status: SHIPPED | OUTPATIENT
Start: 2024-02-06 | End: 2024-02-16

## 2024-02-06 ASSESSMENT — ENCOUNTER SYMPTOMS
PHOTOPHOBIA: 0
NAUSEA: 0
VOMITING: 0
BACK PAIN: 0
RHINORRHEA: 0
SHORTNESS OF BREATH: 0
COUGH: 0
VOICE CHANGE: 0
COLOR CHANGE: 0

## 2024-02-06 ASSESSMENT — PATIENT HEALTH QUESTIONNAIRE - PHQ9
3. TROUBLE FALLING OR STAYING ASLEEP: 0
8. MOVING OR SPEAKING SO SLOWLY THAT OTHER PEOPLE COULD HAVE NOTICED. OR THE OPPOSITE, BEING SO FIGETY OR RESTLESS THAT YOU HAVE BEEN MOVING AROUND A LOT MORE THAN USUAL: 0
SUM OF ALL RESPONSES TO PHQ QUESTIONS 1-9: 0
2. FEELING DOWN, DEPRESSED OR HOPELESS: 0
5. POOR APPETITE OR OVEREATING: 0
10. IF YOU CHECKED OFF ANY PROBLEMS, HOW DIFFICULT HAVE THESE PROBLEMS MADE IT FOR YOU TO DO YOUR WORK, TAKE CARE OF THINGS AT HOME, OR GET ALONG WITH OTHER PEOPLE: 0
1. LITTLE INTEREST OR PLEASURE IN DOING THINGS: 0
9. THOUGHTS THAT YOU WOULD BE BETTER OFF DEAD, OR OF HURTING YOURSELF: 0
4. FEELING TIRED OR HAVING LITTLE ENERGY: 0
SUM OF ALL RESPONSES TO PHQ QUESTIONS 1-9: 0
6. FEELING BAD ABOUT YOURSELF - OR THAT YOU ARE A FAILURE OR HAVE LET YOURSELF OR YOUR FAMILY DOWN: 0
SUM OF ALL RESPONSES TO PHQ9 QUESTIONS 1 & 2: 0

## 2024-02-06 NOTE — PROGRESS NOTES
[facility]  [address]  [address]  [place] [address]  Dept: [phone]  Dept Fax: [phone]  Loc: [phone]    Marla Chaney is a 58 y.o. female who presents today for her medical conditions/complaints as noted below.  Marla Chaney is c/o of Annual Exam (Pt in office for annual exam/physical - no new issues or concerns)        HPI:     HPI   Chief Complaint   Patient presents with    Annual Exam     Pt in office for annual exam/physical - no new issues or concerns     Patient presents today for annual exam and follow-up muscular dystrophy. She has history of diarrhea related to MD. She is due for mammogram and labs. She had colonoscopy done in Dec 2023 at [facility] per Dr Grace.      She is requesting diapers/supplies as well as order for boost drinks today.      Past Medical History:   Diagnosis Date    Anxiety     Collapse of right lung 2022    in conjunction with uri    Depression     Hyperlipidemia     Muscular dystrophy (HCC)     Urinary incontinence     Uterine cancer (HCC)       Past Surgical History:   Procedure Laterality Date    ANKLE SURGERY Left     APPENDECTOMY      pt states possibly removed with rolanda     SECTION      CHOLECYSTECTOMY      COLONOSCOPY  2017    Dr. Grace    EYE SURGERY      done in [place]    HYSTERECTOMY (CERVIX STATUS UNKNOWN)      LEG SURGERY      mariela in right leg    MYRINGOTOMY Left 3/31/2023    LEFT MYRINGOTOMY TUBE INSERTION performed by Wolfgang Alatorre MD at [facility] OR    NECK SURGERY      extra ligament    OVARY REMOVAL Bilateral     TYMPANOSTOMY TUBE PLACEMENT      TYMPANOSTOMY TUBE PLACEMENT Left 3/31/2023    LEFT EUSTACHIAN BALLOON DILATION performed by Wolfgang Alatorre MD at [facility] OR           2024    10:42 AM 2023    11:07 AM 2023     1:59 PM 2023     6:41 PM 2023     2:10 PM 2023    12:13 PM   Vitals   SYSTOLIC 126 124 118 122 134 132   DIASTOLIC 72 76 72 72 86 84

## 2024-02-06 NOTE — PATIENT INSTRUCTIONS
Mammogram as scheduled.   Fasting labs in [address] within 1-2 weeks.   Follow-up in 6 months or sooner if needed.

## 2024-02-14 ENCOUNTER — HOSPITAL ENCOUNTER (OUTPATIENT)
Dept: WOMENS IMAGING | Age: 59
Discharge: HOME | End: 2024-02-14
Payer: MEDICAID

## 2024-02-14 DIAGNOSIS — Z12.31 ENCOUNTER FOR SCREENING MAMMOGRAM FOR MALIGNANT NEOPLASM OF BREAST: ICD-10-CM

## 2024-02-14 DIAGNOSIS — G71.00 MUSCULAR DYSTROPHY (HCC): ICD-10-CM

## 2024-02-14 DIAGNOSIS — Z00.00 ENCOUNTER FOR ANNUAL PHYSICAL EXAM: ICD-10-CM

## 2024-02-14 DIAGNOSIS — N39.42 URINARY INCONTINENCE WITHOUT SENSORY AWARENESS: ICD-10-CM

## 2024-02-14 DIAGNOSIS — R15.9 INCONTINENCE OF FECES, UNSPECIFIED FECAL INCONTINENCE TYPE: ICD-10-CM

## 2024-02-14 LAB
25(OH)D3 SERPL-MCNC: 23 NG/ML
ALBUMIN SERPL-MCNC: 3.8 G/DL (ref 3.5–5.2)
ALP SERPL-CCNC: 89 U/L (ref 35–104)
ALT SERPL-CCNC: 13 U/L (ref 5–33)
ANION GAP SERPL CALCULATED.3IONS-SCNC: 13 MMOL/L (ref 7–19)
AST SERPL-CCNC: 17 U/L (ref 5–32)
BASOPHILS # BLD: 0 K/UL (ref 0–0.2)
BASOPHILS NFR BLD: 0.5 % (ref 0–1)
BILIRUB SERPL-MCNC: <0.2 MG/DL (ref 0.2–1.2)
BUN SERPL-MCNC: 10 MG/DL (ref 6–20)
CALCIUM SERPL-MCNC: 9.7 MG/DL (ref 8.6–10)
CHLORIDE SERPL-SCNC: 101 MMOL/L (ref 98–111)
CHOLEST SERPL-MCNC: 306 MG/DL (ref 160–199)
CO2 SERPL-SCNC: 27 MMOL/L (ref 22–29)
CREAT SERPL-MCNC: 0.3 MG/DL (ref 0.5–0.9)
EOSINOPHIL # BLD: 0.1 K/UL (ref 0–0.6)
EOSINOPHIL NFR BLD: 1 % (ref 0–5)
ERYTHROCYTE [DISTWIDTH] IN BLOOD BY AUTOMATED COUNT: 13.8 % (ref 11.5–14.5)
GLUCOSE SERPL-MCNC: 95 MG/DL (ref 74–109)
HBA1C MFR BLD: 5.8 % (ref 4–6)
HCT VFR BLD AUTO: 48.9 % (ref 37–47)
HCV AB SERPL QL IA: NORMAL
HDLC SERPL-MCNC: 48 MG/DL (ref 65–121)
HGB BLD-MCNC: 14.5 G/DL (ref 12–16)
HIV-1 P24 AG: NORMAL
HIV1+2 AB SERPLBLD QL IA.RAPID: NORMAL
IMM GRANULOCYTES # BLD: 0 K/UL
LDLC SERPL CALC-MCNC: 233 MG/DL
LYMPHOCYTES # BLD: 2.6 K/UL (ref 1.1–4.5)
LYMPHOCYTES NFR BLD: 34.3 % (ref 20–40)
MCH RBC QN AUTO: 30.9 PG (ref 27–31)
MCHC RBC AUTO-ENTMCNC: 29.7 G/DL (ref 33–37)
MCV RBC AUTO: 104 FL (ref 81–99)
MONOCYTES # BLD: 0.3 K/UL (ref 0–0.9)
MONOCYTES NFR BLD: 4.2 % (ref 0–10)
NEUTROPHILS # BLD: 4.6 K/UL (ref 1.5–7.5)
NEUTS SEG NFR BLD: 59.7 % (ref 50–65)
PLATELET # BLD AUTO: 228 K/UL (ref 130–400)
PLATELET SLIDE REVIEW: ADEQUATE
PMV BLD AUTO: 9.9 FL (ref 9.4–12.3)
POTASSIUM SERPL-SCNC: 4.2 MMOL/L (ref 3.5–5)
PROT SERPL-MCNC: 7 G/DL (ref 6.6–8.7)
RBC # BLD AUTO: 4.7 M/UL (ref 4.2–5.4)
SODIUM SERPL-SCNC: 141 MMOL/L (ref 136–145)
TRIGL SERPL-MCNC: 127 MG/DL (ref 0–149)
TSH SERPL DL<=0.005 MIU/L-ACNC: 0.92 UIU/ML (ref 0.27–4.2)
WBC # BLD AUTO: 7.6 K/UL (ref 4.8–10.8)

## 2024-02-14 PROCEDURE — 77063 BREAST TOMOSYNTHESIS BI: CPT

## 2024-02-27 RX ORDER — ROSUVASTATIN CALCIUM 20 MG/1
20 TABLET, COATED ORAL NIGHTLY
Qty: 30 TABLET | Refills: 3 | Status: SHIPPED | OUTPATIENT
Start: 2024-02-27

## 2024-02-27 RX ORDER — ERGOCALCIFEROL 1.25 MG/1
50000 CAPSULE ORAL WEEKLY
Qty: 12 CAPSULE | Refills: 0 | Status: SHIPPED | OUTPATIENT
Start: 2024-02-27

## 2024-04-24 ENCOUNTER — OFFICE VISIT (OUTPATIENT)
Dept: PRIMARY CARE CLINIC | Age: 59
End: 2024-04-24
Payer: MEDICAID

## 2024-04-24 VITALS
HEART RATE: 99 BPM | TEMPERATURE: 97.2 F | BODY MASS INDEX: 32.15 KG/M2 | HEIGHT: 65 IN | OXYGEN SATURATION: 97 % | WEIGHT: 193 LBS

## 2024-04-24 DIAGNOSIS — L30.9 DERMATITIS: Primary | ICD-10-CM

## 2024-04-24 PROCEDURE — 99213 OFFICE O/P EST LOW 20 MIN: CPT | Performed by: NURSE PRACTITIONER

## 2024-04-24 PROCEDURE — 96372 THER/PROPH/DIAG INJ SC/IM: CPT | Performed by: NURSE PRACTITIONER

## 2024-04-24 RX ORDER — TRIAMCINOLONE ACETONIDE 0.25 MG/G
CREAM TOPICAL
Qty: 1 EACH | Refills: 0 | Status: SHIPPED | OUTPATIENT
Start: 2024-04-24

## 2024-04-24 RX ORDER — DEXAMETHASONE SODIUM PHOSPHATE 10 MG/ML
10 INJECTION INTRAMUSCULAR; INTRAVENOUS ONCE
Status: COMPLETED | OUTPATIENT
Start: 2024-04-24 | End: 2024-04-24

## 2024-04-24 RX ADMIN — DEXAMETHASONE SODIUM PHOSPHATE 10 MG: 10 INJECTION INTRAMUSCULAR; INTRAVENOUS at 16:19

## 2024-04-24 ASSESSMENT — ENCOUNTER SYMPTOMS
SHORTNESS OF BREATH: 0
NAUSEA: 0
PHOTOPHOBIA: 0
VOICE CHANGE: 0
COUGH: 0
VOMITING: 0
COLOR CHANGE: 0
BACK PAIN: 0
RHINORRHEA: 0

## 2024-04-24 NOTE — PROGRESS NOTES
Hematological:  Does not bruise/bleed easily.   Psychiatric/Behavioral:  Negative for sleep disturbance and suicidal ideas.        Objective:     Physical Exam  Vitals and nursing note reviewed.   Constitutional:       Appearance: She is well-developed.   HENT:      Head: Atraumatic.      Right Ear: External ear normal.      Left Ear: External ear normal.      Nose: Nose normal.   Eyes:      Conjunctiva/sclera: Conjunctivae normal.      Pupils: Pupils are equal, round, and reactive to light.   Cardiovascular:      Rate and Rhythm: Normal rate and regular rhythm.      Heart sounds: Normal heart sounds, S1 normal and S2 normal.   Pulmonary:      Effort: Pulmonary effort is normal.      Breath sounds: Normal breath sounds.   Abdominal:      General: Bowel sounds are normal.      Palpations: Abdomen is soft.   Musculoskeletal:         General: Normal range of motion.      Cervical back: Normal range of motion and neck supple.   Skin:     General: Skin is warm and dry.      Findings: Rash present. Rash is urticarial.   Neurological:      Mental Status: She is alert and oriented to person, place, and time.   Psychiatric:         Behavior: Behavior normal.       Pulse 99   Temp 97.2 °F (36.2 °C) (Temporal)   Ht 1.651 m (5' 5\")   Wt 87.5 kg (193 lb)   LMP  (LMP Unknown)   SpO2 97%   BMI 32.12 kg/m²     Assessment:   Assessment & Plan    Diagnosis Orders   1. Dermatitis  dexAMETHasone (DECADRON) injection 10 mg            Plan:     Decadron injection today in clinic. May use benadryl as needed for itching.   Triamcinolone cream to affected areas.   Will treat for scabies/bedbugs/possible due to community living situation.     PDMP Monitoring:    Last PDMP Boy as Reviewed:  Review User Review Instant Review Result            Urine Drug Screenings (1 yr)       POCT Rapid Drug Screen  Collected: 9/22/2020  1:20 PM (Final result)              POCT Rapid Drug Screen  Collected: 2/28/2020 (Final result)              POCT

## 2024-07-09 ENCOUNTER — TELEPHONE (OUTPATIENT)
Dept: ENT CLINIC | Age: 59
End: 2024-07-09

## 2024-07-09 NOTE — TELEPHONE ENCOUNTER
Marla called to schedule an appointment for Office Visit with Dr. Wolfgang Alatorre only. [facility] was unable to accommodate in the time frame needed. Please be advised that the best time to call Anytime. Marla is complaining of issues with her ear tubes along with pain and drainage. Marla did not wish to schedule with another provider. Please contact patient at [phone].     Thank you.

## 2024-07-16 RX ORDER — TRIAMCINOLONE ACETONIDE 0.25 MG/G
CREAM TOPICAL
Qty: 1 EACH | Refills: 0 | OUTPATIENT
Start: 2024-07-16

## 2024-08-20 DIAGNOSIS — N39.42 URINARY INCONTINENCE WITHOUT SENSORY AWARENESS: ICD-10-CM

## 2024-08-20 DIAGNOSIS — R15.9 INCONTINENCE OF FECES, UNSPECIFIED FECAL INCONTINENCE TYPE: ICD-10-CM

## 2024-08-26 ENCOUNTER — OFFICE VISIT (OUTPATIENT)
Dept: ENT CLINIC | Age: 59
End: 2024-08-26
Payer: MEDICAID

## 2024-08-26 VITALS — DIASTOLIC BLOOD PRESSURE: 78 MMHG | SYSTOLIC BLOOD PRESSURE: 124 MMHG

## 2024-08-26 DIAGNOSIS — H69.92 DYSFUNCTION OF LEFT EUSTACHIAN TUBE: Primary | ICD-10-CM

## 2024-08-26 DIAGNOSIS — R42 VERTIGO: ICD-10-CM

## 2024-08-26 DIAGNOSIS — H65.92 MIDDLE EAR EFFUSION, LEFT: ICD-10-CM

## 2024-08-26 PROCEDURE — 99214 OFFICE O/P EST MOD 30 MIN: CPT | Performed by: OTOLARYNGOLOGY

## 2024-08-26 PROCEDURE — 69433 CREATE EARDRUM OPENING: CPT | Performed by: OTOLARYNGOLOGY

## 2024-08-26 RX ORDER — OFLOXACIN 3 MG/ML
5 SOLUTION AURICULAR (OTIC) 2 TIMES DAILY
Qty: 5 ML | Refills: 0 | Status: SHIPPED | OUTPATIENT
Start: 2024-08-26 | End: 2024-08-31

## 2024-08-26 NOTE — PROGRESS NOTES
2024    Marla Chaney (:  1965) is a 59 y.o. female, Established patient, here for evaluation of the following chief complaint(s):  Follow-up (Ears, tube has come out)      Vitals:    24 1428   BP: 124/78       Wt Readings from Last 3 Encounters:   24 87.5 kg (193 lb)   24 88 kg (194 lb)   23 88.5 kg (195 lb)       BP Readings from Last 3 Encounters:   24 124/78   24 126/72   23 124/76         SUBJECTIVE/OBJECTIVE:    Patient seen today for her left ear.  So far done balloon dilation and ear tubes by secondary to about 8 months ago.  The second tube came out about 2 months ago and now she has pressure on that side again.  She also suffers from significant vertigo.        Review of Systems   HENT:          Ear pressure sensation left   Neurological:  Positive for dizziness.        Physical Exam  Vitals reviewed.   Constitutional:       Appearance: Normal appearance. She is normal weight.   HENT:      Head: Normocephalic and atraumatic.      Right Ear: Tympanic membrane, ear canal and external ear normal.      Left Ear: Ear canal and external ear normal. A middle ear effusion is present.      Ears:      Comments: Tube in left canal     Nose: Nose normal.      Mouth/Throat:      Mouth: Mucous membranes are moist.      Pharynx: Oropharynx is clear.   Eyes:      Extraocular Movements: Extraocular movements intact.      Pupils: Pupils are equal, round, and reactive to light.   Cardiovascular:      Rate and Rhythm: Normal rate and regular rhythm.   Pulmonary:      Effort: Pulmonary effort is normal.      Breath sounds: Normal breath sounds.   Musculoskeletal:      Cervical back: Normal range of motion.   Skin:     General: Skin is warm and dry.   Neurological:      General: No focal deficit present.      Mental Status: She is alert and oriented to person, place, and time.   Psychiatric:         Mood and Affect: Mood normal.         Behavior: Behavior normal.

## 2024-12-18 ENCOUNTER — OFFICE VISIT (OUTPATIENT)
Dept: PRIMARY CARE CLINIC | Age: 59
End: 2024-12-18

## 2024-12-18 VITALS
OXYGEN SATURATION: 97 % | HEIGHT: 65 IN | DIASTOLIC BLOOD PRESSURE: 82 MMHG | WEIGHT: 194 LBS | BODY MASS INDEX: 32.32 KG/M2 | SYSTOLIC BLOOD PRESSURE: 116 MMHG | HEART RATE: 96 BPM | TEMPERATURE: 97.5 F

## 2024-12-18 DIAGNOSIS — R68.89 FLU-LIKE SYMPTOMS: ICD-10-CM

## 2024-12-18 DIAGNOSIS — J06.9 ACUTE UPPER RESPIRATORY INFECTION: Primary | ICD-10-CM

## 2024-12-18 RX ORDER — DEXTROMETHORPHAN HYDROBROMIDE AND PROMETHAZINE HYDROCHLORIDE 15; 6.25 MG/5ML; MG/5ML
5 SYRUP ORAL 4 TIMES DAILY PRN
Qty: 118 ML | Refills: 0 | Status: SHIPPED | OUTPATIENT
Start: 2024-12-18 | End: 2024-12-25

## 2024-12-18 RX ORDER — DEXAMETHASONE SODIUM PHOSPHATE 10 MG/ML
10 INJECTION INTRAMUSCULAR; INTRAVENOUS ONCE
Status: COMPLETED | OUTPATIENT
Start: 2024-12-18 | End: 2024-12-18

## 2024-12-18 RX ORDER — DOXYCYCLINE HYCLATE 100 MG
100 TABLET ORAL 2 TIMES DAILY
Qty: 20 TABLET | Refills: 0 | Status: SHIPPED | OUTPATIENT
Start: 2024-12-18 | End: 2024-12-28

## 2024-12-18 RX ADMIN — DEXAMETHASONE SODIUM PHOSPHATE 10 MG: 10 INJECTION INTRAMUSCULAR; INTRAVENOUS at 12:18

## 2024-12-18 SDOH — ECONOMIC STABILITY: FOOD INSECURITY: WITHIN THE PAST 12 MONTHS, YOU WORRIED THAT YOUR FOOD WOULD RUN OUT BEFORE YOU GOT MONEY TO BUY MORE.: NEVER TRUE

## 2024-12-18 SDOH — ECONOMIC STABILITY: INCOME INSECURITY: HOW HARD IS IT FOR YOU TO PAY FOR THE VERY BASICS LIKE FOOD, HOUSING, MEDICAL CARE, AND HEATING?: NOT HARD AT ALL

## 2024-12-18 SDOH — ECONOMIC STABILITY: FOOD INSECURITY: WITHIN THE PAST 12 MONTHS, THE FOOD YOU BOUGHT JUST DIDN'T LAST AND YOU DIDN'T HAVE MONEY TO GET MORE.: NEVER TRUE

## 2024-12-18 ASSESSMENT — ENCOUNTER SYMPTOMS
PHOTOPHOBIA: 0
SHORTNESS OF BREATH: 0
VOICE CHANGE: 0
RHINORRHEA: 1
BACK PAIN: 0
COLOR CHANGE: 0
COUGH: 1
NAUSEA: 0
VOMITING: 0

## 2024-12-18 NOTE — PATIENT INSTRUCTIONS
Decadron injection today.   Promethazine DM as needed for cough.   Begin Doxycycline as directed.   Breathing treatment every 4-6 hours.   Follow-up if not improving.

## 2024-12-18 NOTE — PROGRESS NOTES
inadvertentlytranscribed.  Although I have reviewed the note for such errors, some may still exist.

## 2024-12-23 RX ORDER — DEXTROMETHORPHAN HYDROBROMIDE AND PROMETHAZINE HYDROCHLORIDE 15; 6.25 MG/5ML; MG/5ML
5 SYRUP ORAL 4 TIMES DAILY PRN
Qty: 118 ML | Refills: 0 | Status: SHIPPED | OUTPATIENT
Start: 2024-12-23 | End: 2024-12-30

## 2024-12-23 NOTE — TELEPHONE ENCOUNTER
Marla Chaney called to request a refill on her medication.      Last office visit : 12/18/2024   Next office visit : 1/6/2025     Requested Prescriptions     Pending Prescriptions Disp Refills    promethazine-dextromethorphan (PROMETHAZINE-DM) 6.25-15 MG/5ML syrup 118 mL 0     Sig: Take 5 mLs by mouth 4 times daily as needed for Cough            Anika Marie LPN

## 2025-02-10 ENCOUNTER — OFFICE VISIT (OUTPATIENT)
Dept: PRIMARY CARE CLINIC | Age: 60
End: 2025-02-10
Payer: MEDICAID

## 2025-02-10 VITALS
HEART RATE: 88 BPM | OXYGEN SATURATION: 99 % | WEIGHT: 183 LBS | DIASTOLIC BLOOD PRESSURE: 84 MMHG | BODY MASS INDEX: 30.49 KG/M2 | SYSTOLIC BLOOD PRESSURE: 118 MMHG | HEIGHT: 65 IN | TEMPERATURE: 97.4 F

## 2025-02-10 DIAGNOSIS — Z00.00 ENCOUNTER FOR ANNUAL PHYSICAL EXAM: Primary | ICD-10-CM

## 2025-02-10 DIAGNOSIS — Z12.31 ENCOUNTER FOR SCREENING MAMMOGRAM FOR BREAST CANCER: ICD-10-CM

## 2025-02-10 DIAGNOSIS — G71.00 MUSCULAR DYSTROPHY (HCC): ICD-10-CM

## 2025-02-10 DIAGNOSIS — G89.29 OTHER CHRONIC PAIN: ICD-10-CM

## 2025-02-10 DIAGNOSIS — F41.9 ANXIETY: ICD-10-CM

## 2025-02-10 PROCEDURE — 99396 PREV VISIT EST AGE 40-64: CPT | Performed by: NURSE PRACTITIONER

## 2025-02-10 RX ORDER — DEXTROMETHORPHAN HYDROBROMIDE AND PROMETHAZINE HYDROCHLORIDE 15; 6.25 MG/5ML; MG/5ML
5 SYRUP ORAL 4 TIMES DAILY PRN
Qty: 180 ML | Refills: 0 | Status: SHIPPED | OUTPATIENT
Start: 2025-02-10 | End: 2025-02-17

## 2025-02-10 SDOH — ECONOMIC STABILITY: FOOD INSECURITY: WITHIN THE PAST 12 MONTHS, THE FOOD YOU BOUGHT JUST DIDN'T LAST AND YOU DIDN'T HAVE MONEY TO GET MORE.: NEVER TRUE

## 2025-02-10 SDOH — ECONOMIC STABILITY: FOOD INSECURITY: WITHIN THE PAST 12 MONTHS, YOU WORRIED THAT YOUR FOOD WOULD RUN OUT BEFORE YOU GOT MONEY TO BUY MORE.: NEVER TRUE

## 2025-02-10 ASSESSMENT — ENCOUNTER SYMPTOMS
COLOR CHANGE: 0
COUGH: 0
NAUSEA: 0
BACK PAIN: 0
PHOTOPHOBIA: 0
SHORTNESS OF BREATH: 0
RHINORRHEA: 0
VOICE CHANGE: 0
VOMITING: 0

## 2025-02-10 ASSESSMENT — PATIENT HEALTH QUESTIONNAIRE - PHQ9
1. LITTLE INTEREST OR PLEASURE IN DOING THINGS: NOT AT ALL
9. THOUGHTS THAT YOU WOULD BE BETTER OFF DEAD, OR OF HURTING YOURSELF: NOT AT ALL
8. MOVING OR SPEAKING SO SLOWLY THAT OTHER PEOPLE COULD HAVE NOTICED. OR THE OPPOSITE, BEING SO FIGETY OR RESTLESS THAT YOU HAVE BEEN MOVING AROUND A LOT MORE THAN USUAL: NOT AT ALL
SUM OF ALL RESPONSES TO PHQ9 QUESTIONS 1 & 2: 0
6. FEELING BAD ABOUT YOURSELF - OR THAT YOU ARE A FAILURE OR HAVE LET YOURSELF OR YOUR FAMILY DOWN: NOT AT ALL
3. TROUBLE FALLING OR STAYING ASLEEP: NOT AT ALL
SUM OF ALL RESPONSES TO PHQ QUESTIONS 1-9: 0
SUM OF ALL RESPONSES TO PHQ QUESTIONS 1-9: 0
2. FEELING DOWN, DEPRESSED OR HOPELESS: NOT AT ALL
5. POOR APPETITE OR OVEREATING: NOT AT ALL
7. TROUBLE CONCENTRATING ON THINGS, SUCH AS READING THE NEWSPAPER OR WATCHING TELEVISION: NOT AT ALL
SUM OF ALL RESPONSES TO PHQ QUESTIONS 1-9: 0
SUM OF ALL RESPONSES TO PHQ QUESTIONS 1-9: 0
10. IF YOU CHECKED OFF ANY PROBLEMS, HOW DIFFICULT HAVE THESE PROBLEMS MADE IT FOR YOU TO DO YOUR WORK, TAKE CARE OF THINGS AT HOME, OR GET ALONG WITH OTHER PEOPLE: NOT DIFFICULT AT ALL
4. FEELING TIRED OR HAVING LITTLE ENERGY: NOT AT ALL

## 2025-02-10 NOTE — PROGRESS NOTES
2/10/2025 at 1:39 PM    EMR Dragon/transcription disclaimer:  Much of thisencounter note is electronic transcription/translation of spoken language to printed texts.  The electronic translation of spoken language may be erroneous, or at times, nonsensical words or phrases may be inadvertentlytranscribed.  Although I have reviewed the note for such errors, some may still exist.

## 2025-02-10 NOTE — PATIENT INSTRUCTIONS
Fasting labs in [address].   ECHO to be scheduled.   Mammogram.   Follow-up in 6 months or sooner if needed.

## 2025-02-20 DIAGNOSIS — G71.00 MUSCULAR DYSTROPHY (HCC): Primary | ICD-10-CM

## 2025-02-21 RX ORDER — KETOTIFEN FUMARATE 0.025 %
1 DROPS OPHTHALMIC (EYE) DAILY
Qty: 30 EACH | Refills: 11 | Status: SHIPPED | OUTPATIENT
Start: 2025-02-21 | End: 2025-03-23

## 2025-02-24 DIAGNOSIS — R15.9 INCONTINENCE OF FECES, UNSPECIFIED FECAL INCONTINENCE TYPE: Primary | ICD-10-CM

## 2025-02-24 DIAGNOSIS — G71.00 MUSCULAR DYSTROPHY (HCC): Primary | ICD-10-CM

## 2025-02-24 DIAGNOSIS — G71.00 MUSCULAR DYSTROPHY (HCC): ICD-10-CM

## 2025-02-24 DIAGNOSIS — N39.42 URINARY INCONTINENCE WITHOUT SENSORY AWARENESS: ICD-10-CM

## 2025-02-24 RX ORDER — ZINC OXIDE 216 MG/ML
1 LOTION TOPICAL DAILY
Qty: 30 EACH | Refills: 11 | Status: SHIPPED | OUTPATIENT
Start: 2025-02-24

## 2025-03-04 ENCOUNTER — OFFICE VISIT (OUTPATIENT)
Dept: ENT CLINIC | Age: 60
End: 2025-03-04
Payer: MEDICAID

## 2025-03-04 VITALS — SYSTOLIC BLOOD PRESSURE: 124 MMHG | DIASTOLIC BLOOD PRESSURE: 70 MMHG

## 2025-03-04 DIAGNOSIS — G71.00 MUSCULAR DYSTROPHY (HCC): ICD-10-CM

## 2025-03-04 DIAGNOSIS — H65.92 MIDDLE EAR EFFUSION, LEFT: Primary | ICD-10-CM

## 2025-03-04 DIAGNOSIS — G89.29 OTHER CHRONIC PAIN: ICD-10-CM

## 2025-03-04 DIAGNOSIS — F41.9 ANXIETY: ICD-10-CM

## 2025-03-04 DIAGNOSIS — H69.92 DYSFUNCTION OF LEFT EUSTACHIAN TUBE: ICD-10-CM

## 2025-03-04 DIAGNOSIS — R13.10 DYSPHAGIA, UNSPECIFIED TYPE: ICD-10-CM

## 2025-03-04 DIAGNOSIS — J34.89 RHINORRHEA: ICD-10-CM

## 2025-03-04 LAB
25(OH)D3 SERPL-MCNC: 23.2 NG/ML
ALBUMIN SERPL-MCNC: 3.8 G/DL (ref 3.5–5.2)
ALP SERPL-CCNC: 88 U/L (ref 35–104)
ALT SERPL-CCNC: 16 U/L (ref 10–35)
ANION GAP SERPL CALCULATED.3IONS-SCNC: 12 MMOL/L (ref 8–16)
AST SERPL-CCNC: 23 U/L (ref 10–35)
BASOPHILS # BLD: 0.1 K/UL (ref 0–0.2)
BASOPHILS NFR BLD: 0.5 % (ref 0–1)
BILIRUB SERPL-MCNC: <0.2 MG/DL (ref 0.2–1.2)
BUN SERPL-MCNC: 13 MG/DL (ref 6–20)
CALCIUM SERPL-MCNC: 9.8 MG/DL (ref 8.6–10)
CHLORIDE SERPL-SCNC: 105 MMOL/L (ref 98–107)
CHOLEST SERPL-MCNC: 282 MG/DL (ref 0–199)
CO2 SERPL-SCNC: 25 MMOL/L (ref 22–29)
CREAT SERPL-MCNC: 0.4 MG/DL (ref 0.5–0.9)
EOSINOPHIL # BLD: 0.1 K/UL (ref 0–0.6)
EOSINOPHIL NFR BLD: 1 % (ref 0–5)
ERYTHROCYTE [DISTWIDTH] IN BLOOD BY AUTOMATED COUNT: 14.2 % (ref 11.5–14.5)
GLUCOSE SERPL-MCNC: 103 MG/DL (ref 70–99)
HBA1C MFR BLD: 5.8 % (ref 4–5.6)
HCT VFR BLD AUTO: 45.6 % (ref 37–47)
HDLC SERPL-MCNC: 43 MG/DL (ref 40–60)
HGB BLD-MCNC: 14.3 G/DL (ref 12–16)
IMM GRANULOCYTES # BLD: 0 K/UL
LDLC SERPL CALC-MCNC: 208 MG/DL
LYMPHOCYTES # BLD: 3.2 K/UL (ref 1.1–4.5)
LYMPHOCYTES NFR BLD: 32.4 % (ref 20–40)
MCH RBC QN AUTO: 31 PG (ref 27–31)
MCHC RBC AUTO-ENTMCNC: 31.4 G/DL (ref 33–37)
MCV RBC AUTO: 98.9 FL (ref 81–99)
MONOCYTES # BLD: 0.5 K/UL (ref 0–0.9)
MONOCYTES NFR BLD: 5.3 % (ref 0–10)
NEUTROPHILS # BLD: 6 K/UL (ref 1.5–7.5)
NEUTS SEG NFR BLD: 60.5 % (ref 50–65)
PLATELET # BLD AUTO: 232 K/UL (ref 130–400)
PMV BLD AUTO: 10.6 FL (ref 9.4–12.3)
POTASSIUM SERPL-SCNC: 4.3 MMOL/L (ref 3.5–5.1)
PROT SERPL-MCNC: 6.9 G/DL (ref 6.4–8.3)
RBC # BLD AUTO: 4.61 M/UL (ref 4.2–5.4)
SODIUM SERPL-SCNC: 142 MMOL/L (ref 136–145)
TRIGL SERPL-MCNC: 155 MG/DL (ref 0–149)
TSH SERPL DL<=0.005 MIU/L-ACNC: 1.23 UIU/ML (ref 0.27–4.2)
WBC # BLD AUTO: 10 K/UL (ref 4.8–10.8)

## 2025-03-04 PROCEDURE — 99214 OFFICE O/P EST MOD 30 MIN: CPT | Performed by: OTOLARYNGOLOGY

## 2025-03-04 RX ORDER — PREDNISONE 20 MG/1
TABLET ORAL
Qty: 21 TABLET | Refills: 0 | Status: SHIPPED | OUTPATIENT
Start: 2025-03-04

## 2025-03-04 RX ORDER — IPRATROPIUM BROMIDE 21 UG/1
2 SPRAY, METERED NASAL EVERY 12 HOURS
Qty: 30 ML | Refills: 3 | Status: SHIPPED | OUTPATIENT
Start: 2025-03-04

## 2025-03-04 ASSESSMENT — ENCOUNTER SYMPTOMS
EYES NEGATIVE: 1
TROUBLE SWALLOWING: 1
RHINORRHEA: 1
RESPIRATORY NEGATIVE: 1
GASTROINTESTINAL NEGATIVE: 1
ALLERGIC/IMMUNOLOGIC NEGATIVE: 1

## 2025-03-04 NOTE — PROGRESS NOTES
3/4/2025    Marla Chaney (:  1965) is a 59 y.o. female, Established patient, here for evaluation of the following chief complaint(s):  Follow-up (Difficulty swallowing, left ear pain)      Vitals:    25 0935   BP: 124/70       Wt Readings from Last 3 Encounters:   02/10/25 83 kg (183 lb)   24 88 kg (194 lb)   24 87.5 kg (193 lb)       BP Readings from Last 3 Encounters:   25 124/70   02/10/25 118/84   24 116/82         SUBJECTIVE/OBJECTIVE:    Patient seen today for her ears.  I put a tube in her ear last time I saw her about a months ago the tube now out again.  She complains of pressure on that side and some mild itching on the other side.  She also complains of chronic runny nose as well as difficulty swallowing.  She was last dilated by GI in .        Review of Systems   Constitutional: Negative.    HENT:  Positive for rhinorrhea and trouble swallowing.    Eyes: Negative.    Respiratory: Negative.     Cardiovascular: Negative.    Gastrointestinal: Negative.    Endocrine: Negative.    Musculoskeletal: Negative.    Skin: Negative.    Allergic/Immunologic: Negative.    Neurological: Negative.    Hematological: Negative.    Psychiatric/Behavioral: Negative.          Physical Exam  Vitals reviewed.   Constitutional:       Appearance: Normal appearance. She is normal weight.   HENT:      Head: Normocephalic and atraumatic.      Right Ear: Tympanic membrane, ear canal and external ear normal.      Left Ear: Ear canal and external ear normal. A middle ear effusion is present.      Nose: Nose normal.      Mouth/Throat:      Mouth: Mucous membranes are moist.      Pharynx: Oropharynx is clear.   Eyes:      Extraocular Movements: Extraocular movements intact.      Pupils: Pupils are equal, round, and reactive to light.   Cardiovascular:      Rate and Rhythm: Normal rate and regular rhythm.   Pulmonary:      Effort: Pulmonary effort is normal.      Breath sounds: Normal breath

## 2025-03-05 ENCOUNTER — TELEPHONE (OUTPATIENT)
Dept: PRIMARY CARE CLINIC | Age: 60
End: 2025-03-05

## 2025-03-05 NOTE — TELEPHONE ENCOUNTER
----- Message from SHANKAR Boyd sent at 3/5/2025 12:23 PM CST -----  Lipids elevated; is she taking her statin medication as directed?     Please inform patient of vitamin d deficiency. I recommend 50,000 units once weekly. Please send to pharmacy.

## 2025-03-05 NOTE — TELEPHONE ENCOUNTER
Marla Chaney called to request a refill on her medication.      Last office visit : 2/10/2025   Next office visit : 8/11/2025     Requested Prescriptions     Pending Prescriptions Disp Refills    vitamin D (ERGOCALCIFEROL) 1.25 MG (37103 UT) CAPS capsule 4 capsule 2     Sig: Take 1 capsule by mouth once a week            Angella Colbert MA

## 2025-03-06 RX ORDER — ERGOCALCIFEROL 1.25 MG/1
50000 CAPSULE, LIQUID FILLED ORAL WEEKLY
Qty: 4 CAPSULE | Refills: 2 | Status: SHIPPED | OUTPATIENT
Start: 2025-03-06

## 2025-03-18 ENCOUNTER — HOSPITAL ENCOUNTER (OUTPATIENT)
Age: 60
Discharge: HOME | End: 2025-03-20
Payer: MEDICAID

## 2025-03-18 ENCOUNTER — RESULTS FOLLOW-UP (OUTPATIENT)
Age: 60
End: 2025-03-18

## 2025-03-18 ENCOUNTER — RESULTS FOLLOW-UP (OUTPATIENT)
Dept: WOMENS IMAGING | Age: 60
End: 2025-03-18

## 2025-03-18 ENCOUNTER — HOSPITAL ENCOUNTER (OUTPATIENT)
Dept: WOMENS IMAGING | Age: 60
Discharge: HOME | End: 2025-03-18
Payer: MEDICAID

## 2025-03-18 VITALS — WEIGHT: 180 LBS | BODY MASS INDEX: 29.95 KG/M2

## 2025-03-18 DIAGNOSIS — G71.00 MUSCULAR DYSTROPHY (HCC): ICD-10-CM

## 2025-03-18 DIAGNOSIS — Z12.31 ENCOUNTER FOR SCREENING MAMMOGRAM FOR BREAST CANCER: ICD-10-CM

## 2025-03-18 LAB
ECHO AO ASC DIAM: 2.8 CM
ECHO AO ROOT DIAM: 1.6 CM
ECHO AO SINUS VALSALVA DIAM: 2.7 CM
ECHO AO ST JNCT DIAM: 1.8 CM
ECHO AV AREA PEAK VELOCITY: 1.9 CM2
ECHO AV AREA VTI: 2.1 CM2
ECHO AV MEAN GRADIENT: 4 MMHG
ECHO AV MEAN VELOCITY: 1 M/S
ECHO AV PEAK GRADIENT: 9 MMHG
ECHO AV PEAK VELOCITY: 1.5 M/S
ECHO AV VELOCITY RATIO: 0.67
ECHO AV VTI: 25.1 CM
ECHO EST RA PRESSURE: 3 MMHG
ECHO IVC PROX: 1.2 CM
ECHO LA AREA 2C: 9.1 CM2
ECHO LA AREA 4C: 8 CM2
ECHO LA DIAMETER: 2.1 CM
ECHO LA MAJOR AXIS: 4.3 CM
ECHO LA MINOR AXIS: 3.4 CM
ECHO LA TO AORTIC ROOT RATIO: 1.31
ECHO LA VOL MOD A2C: 20 ML (ref 22–52)
ECHO LA VOL MOD A4C: 12 ML (ref 22–52)
ECHO LV E' LATERAL VELOCITY: 10.8 CM/S
ECHO LV E' SEPTAL VELOCITY: 6.09 CM/S
ECHO LV EDV A2C: 65 ML
ECHO LV EDV A4C: 95 ML
ECHO LV EF PHYSICIAN: 50 %
ECHO LV EJECTION FRACTION A2C: 38 %
ECHO LV EJECTION FRACTION A4C: 50 %
ECHO LV EJECTION FRACTION BIPLANE: 50 % (ref 55–100)
ECHO LV ESV A2C: 40 ML
ECHO LV ESV A4C: 47 ML
ECHO LV FRACTIONAL SHORTENING: 34 % (ref 28–44)
ECHO LV INTERNAL DIMENSION DIASTOLIC: 4.7 CM (ref 3.9–5.3)
ECHO LV INTERNAL DIMENSION SYSTOLIC: 3.1 CM
ECHO LV IVSD: 0.6 CM (ref 0.6–0.9)
ECHO LV MASS 2D: 112.5 G (ref 67–162)
ECHO LV POSTERIOR WALL DIASTOLIC: 0.9 CM (ref 0.6–0.9)
ECHO LV RELATIVE WALL THICKNESS RATIO: 0.38
ECHO LVOT AREA: 2.8 CM2
ECHO LVOT AV VTI INDEX: 0.75
ECHO LVOT DIAM: 1.9 CM
ECHO LVOT MEAN GRADIENT: 2 MMHG
ECHO LVOT PEAK GRADIENT: 4 MMHG
ECHO LVOT PEAK VELOCITY: 1 M/S
ECHO LVOT SV: 53.3 ML
ECHO LVOT VTI: 18.8 CM
ECHO MV A VELOCITY: 0.77 M/S
ECHO MV AREA VTI: 2.2 CM2
ECHO MV E DECELERATION TIME (DT): 267 MS
ECHO MV E VELOCITY: 0.61 M/S
ECHO MV E/A RATIO: 0.79
ECHO MV E/E' LATERAL: 5.65
ECHO MV E/E' RATIO (AVERAGED): 7.83
ECHO MV E/E' SEPTAL: 10.02
ECHO MV LVOT VTI INDEX: 1.27
ECHO MV MAX VELOCITY: 0.7 M/S
ECHO MV MEAN GRADIENT: 1 MMHG
ECHO MV MEAN VELOCITY: 0.5 M/S
ECHO MV PEAK GRADIENT: 2 MMHG
ECHO MV VTI: 23.9 CM
ECHO RA AREA 4C: 5.8 CM2
ECHO RA VOLUME: 9 ML
ECHO RV BASAL DIMENSION: 2.6 CM
ECHO RV INTERNAL DIMENSION: 2.1 CM
ECHO RV LONGITUDINAL DIMENSION: 6.3 CM
ECHO RV MID DIMENSION: 2.2 CM
ECHO RV TAPSE: 2.2 CM (ref 1.7–?)

## 2025-03-18 PROCEDURE — 6360000004 HC RX CONTRAST MEDICATION: Performed by: INTERNAL MEDICINE

## 2025-03-18 PROCEDURE — 93306 TTE W/DOPPLER COMPLETE: CPT | Performed by: INTERNAL MEDICINE

## 2025-03-18 PROCEDURE — C8929 TTE W OR WO FOL WCON,DOPPLER: HCPCS

## 2025-03-18 PROCEDURE — 77063 BREAST TOMOSYNTHESIS BI: CPT

## 2025-03-18 RX ADMIN — SULFUR HEXAFLUORIDE 2 ML: KIT at 09:48

## 2025-07-16 ENCOUNTER — HOSPITAL ENCOUNTER (EMERGENCY)
Age: 60
Discharge: HOME | End: 2025-07-16
Attending: STUDENT IN AN ORGANIZED HEALTH CARE EDUCATION/TRAINING PROGRAM
Payer: MEDICAID

## 2025-07-16 ENCOUNTER — APPOINTMENT (OUTPATIENT)
Dept: GENERAL RADIOLOGY | Age: 60
End: 2025-07-16
Payer: MEDICAID

## 2025-07-16 VITALS
BODY MASS INDEX: 29.95 KG/M2 | DIASTOLIC BLOOD PRESSURE: 79 MMHG | OXYGEN SATURATION: 99 % | RESPIRATION RATE: 19 BRPM | HEART RATE: 100 BPM | TEMPERATURE: 97.7 F | WEIGHT: 180 LBS | SYSTOLIC BLOOD PRESSURE: 150 MMHG

## 2025-07-16 DIAGNOSIS — R05.1 ACUTE COUGH: Primary | ICD-10-CM

## 2025-07-16 DIAGNOSIS — J06.9 ACUTE UPPER RESPIRATORY INFECTION: ICD-10-CM

## 2025-07-16 LAB
ALBUMIN SERPL-MCNC: 3.8 G/DL (ref 3.5–5.2)
ALP SERPL-CCNC: 151 U/L (ref 35–104)
ALT SERPL-CCNC: 24 U/L (ref 10–35)
ANION GAP SERPL CALCULATED.3IONS-SCNC: 12 MMOL/L (ref 8–16)
AST SERPL-CCNC: 31 U/L (ref 10–35)
B PARAP IS1001 DNA NPH QL NAA+NON-PROBE: NOT DETECTED
B PERT.PT PRMT NPH QL NAA+NON-PROBE: NOT DETECTED
BASOPHILS # BLD: 0 K/UL (ref 0–0.2)
BASOPHILS NFR BLD: 0.3 % (ref 0–1)
BILIRUB SERPL-MCNC: 0.3 MG/DL (ref 0.2–1.2)
BUN SERPL-MCNC: 6 MG/DL (ref 6–20)
C PNEUM DNA NPH QL NAA+NON-PROBE: NOT DETECTED
CALCIUM SERPL-MCNC: 9.7 MG/DL (ref 8.6–10)
CHLORIDE SERPL-SCNC: 101 MMOL/L (ref 98–107)
CO2 SERPL-SCNC: 28 MMOL/L (ref 22–29)
CREAT SERPL-MCNC: 0.3 MG/DL (ref 0.5–0.9)
EOSINOPHIL # BLD: 0.1 K/UL (ref 0–0.6)
EOSINOPHIL NFR BLD: 0.6 % (ref 0–5)
ERYTHROCYTE [DISTWIDTH] IN BLOOD BY AUTOMATED COUNT: 14.3 % (ref 11.5–14.5)
FLUAV RNA NPH QL NAA+NON-PROBE: NOT DETECTED
FLUBV RNA NPH QL NAA+NON-PROBE: NOT DETECTED
GLUCOSE SERPL-MCNC: 124 MG/DL (ref 70–99)
HADV DNA NPH QL NAA+NON-PROBE: NOT DETECTED
HCOV 229E RNA NPH QL NAA+NON-PROBE: NOT DETECTED
HCOV HKU1 RNA NPH QL NAA+NON-PROBE: NOT DETECTED
HCOV NL63 RNA NPH QL NAA+NON-PROBE: NOT DETECTED
HCOV OC43 RNA NPH QL NAA+NON-PROBE: NOT DETECTED
HCT VFR BLD AUTO: 44 % (ref 37–47)
HGB BLD-MCNC: 14 G/DL (ref 12–16)
HMPV RNA NPH QL NAA+NON-PROBE: NOT DETECTED
HPIV1 RNA NPH QL NAA+NON-PROBE: NOT DETECTED
HPIV2 RNA NPH QL NAA+NON-PROBE: NOT DETECTED
HPIV3 RNA NPH QL NAA+NON-PROBE: NOT DETECTED
HPIV4 RNA NPH QL NAA+NON-PROBE: NOT DETECTED
IMM GRANULOCYTES # BLD: 0 K/UL
LYMPHOCYTES # BLD: 2.4 K/UL (ref 1.1–4.5)
LYMPHOCYTES NFR BLD: 21.7 % (ref 20–40)
M PNEUMO DNA NPH QL NAA+NON-PROBE: NOT DETECTED
MCH RBC QN AUTO: 30.8 PG (ref 27–31)
MCHC RBC AUTO-ENTMCNC: 31.8 G/DL (ref 33–37)
MCV RBC AUTO: 96.7 FL (ref 81–99)
MONOCYTES # BLD: 0.7 K/UL (ref 0–0.9)
MONOCYTES NFR BLD: 6.1 % (ref 0–10)
NEUTROPHILS # BLD: 7.8 K/UL (ref 1.5–7.5)
NEUTS SEG NFR BLD: 71 % (ref 50–65)
PLATELET # BLD AUTO: 241 K/UL (ref 130–400)
PMV BLD AUTO: 9.7 FL (ref 9.4–12.3)
POTASSIUM SERPL-SCNC: 3.9 MMOL/L (ref 3.5–5)
PROT SERPL-MCNC: 7.2 G/DL (ref 6.4–8.3)
RBC # BLD AUTO: 4.55 M/UL (ref 4.2–5.4)
RSV RNA NPH QL NAA+NON-PROBE: NOT DETECTED
RV+EV RNA NPH QL NAA+NON-PROBE: DETECTED
SARS-COV-2 RNA NPH QL NAA+NON-PROBE: NOT DETECTED
SODIUM SERPL-SCNC: 141 MMOL/L (ref 136–145)
WBC # BLD AUTO: 11 K/UL (ref 4.8–10.8)

## 2025-07-16 PROCEDURE — 99284 EMERGENCY DEPT VISIT MOD MDM: CPT

## 2025-07-16 PROCEDURE — 80053 COMPREHEN METABOLIC PANEL: CPT

## 2025-07-16 PROCEDURE — 85025 COMPLETE CBC W/AUTO DIFF WBC: CPT

## 2025-07-16 PROCEDURE — 96374 THER/PROPH/DIAG INJ IV PUSH: CPT

## 2025-07-16 PROCEDURE — 0202U NFCT DS 22 TRGT SARS-COV-2: CPT

## 2025-07-16 PROCEDURE — 36415 COLL VENOUS BLD VENIPUNCTURE: CPT

## 2025-07-16 PROCEDURE — 6360000002 HC RX W HCPCS: Performed by: STUDENT IN AN ORGANIZED HEALTH CARE EDUCATION/TRAINING PROGRAM

## 2025-07-16 PROCEDURE — 71046 X-RAY EXAM CHEST 2 VIEWS: CPT

## 2025-07-16 RX ORDER — ASPIRIN 325 MG
325 TABLET ORAL ONCE
Status: DISCONTINUED | OUTPATIENT
Start: 2025-07-16 | End: 2025-07-16

## 2025-07-16 RX ORDER — KETOROLAC TROMETHAMINE 30 MG/ML
30 INJECTION, SOLUTION INTRAMUSCULAR; INTRAVENOUS ONCE
Status: COMPLETED | OUTPATIENT
Start: 2025-07-16 | End: 2025-07-16

## 2025-07-16 RX ADMIN — KETOROLAC TROMETHAMINE 30 MG: 30 INJECTION, SOLUTION INTRAMUSCULAR at 20:38

## 2025-07-16 ASSESSMENT — PAIN DESCRIPTION - ORIENTATION: ORIENTATION: MID

## 2025-07-16 ASSESSMENT — ENCOUNTER SYMPTOMS
SHORTNESS OF BREATH: 0
VOMITING: 0
COUGH: 1
NAUSEA: 0
ABDOMINAL PAIN: 0

## 2025-07-16 ASSESSMENT — PAIN SCALES - GENERAL: PAINLEVEL_OUTOF10: 8

## 2025-07-16 ASSESSMENT — PAIN DESCRIPTION - DESCRIPTORS: DESCRIPTORS: ACHING;SORE

## 2025-07-16 ASSESSMENT — PAIN DESCRIPTION - LOCATION: LOCATION: HEAD

## 2025-07-17 NOTE — ED PROVIDER NOTES
present.      Mental Status: She is alert. Mental status is at baseline.           Imaging:  XR CHEST (2 VW)   Final Result    No acute pulmonary disease.                   ______________________________________    Electronically signed by: FAROOQ JACOBS M.D.   Date:     07/16/2025   Time:    19:52           Labs:  Labs Reviewed   RESPIRATORY PANEL, MOLECULAR, WITH COVID-19 - Abnormal; Notable for the following components:       Result Value    Human Rhinovirus/Enterovirus by PCR DETECTED (*)     All other components within normal limits   CBC WITH AUTO DIFFERENTIAL - Abnormal; Notable for the following components:    WBC 11.0 (*)     MCHC 31.8 (*)     Neutrophils % 71.0 (*)     Neutrophils Absolute 7.8 (*)     All other components within normal limits   COMPREHENSIVE METABOLIC PANEL W/ REFLEX TO MG FOR LOW K - Abnormal; Notable for the following components:    Glucose 124 (*)     Creatinine 0.3 (*)     Alkaline Phosphatase 151 (*)     All other components within normal limits       MDM:  ALEX Chaney is a 60 y.o. female with PMH above who presents to the Emergency Department with shortness of breath.  No adventitious lung sounds to suggest ILD exacerbation.   Low risk assessment for PE. No syncope, no hemoptysis, h/o DVT, evidence of DVT on exam. No recent prolonged travel, surgeries, serious injuries.   There is no evidence of fluid overload; no peripheral edema on exam, history not with focal orthopnea.  Breath sounds present. No indication of pneumothorax.  No chest pain, diaphoresis, or passing out; ACS equivalency unlikely.    Chest x-ray: no pneumothorax, mediastinum not widened, no focal consolidations  Labs reassuring       Patient is stable on re-evaluation. Not with an increased oxygen requirement, no respiratory distress. Continuous sats reassuring. See workup above. Vital signs reassuring. Plan is for discharge with outpatient follow-up. Strict return precautions provided: worsening trouble

## 2025-07-17 NOTE — DISCHARGE INSTRUCTIONS
Please return to the emergency department for fever, worsening trouble breathing, passing out, chest pain, or any new emergencies. Otherwise call your doctor for a follow-up appointment.

## 2025-08-18 RX ORDER — ERGOCALCIFEROL 1.25 MG/1
50000 CAPSULE, LIQUID FILLED ORAL WEEKLY
Qty: 4 CAPSULE | Refills: 0 | Status: SHIPPED | OUTPATIENT
Start: 2025-08-18

## (undated) DEVICE — SOLUTION IV IRRIG POUR BRL 0.9% SODIUM CHL 2F7124

## (undated) DEVICE — Device: Brand: DEFENDO AIR/WATER/SUCTION AND BIOPSY VALVE

## (undated) DEVICE — COVER,MAYO STAND,STERILE: Brand: MEDLINE

## (undated) DEVICE — ENDOGATOR AUXILIARY WATER JET CONNECTOR: Brand: ENDOGATOR

## (undated) DEVICE — SNAR POLYP SENSATION MICRO OVL 13 240X40

## (undated) DEVICE — THE CHANNEL CLEANING BRUSH IS A NYLON FLEXI BRUSH ATTACHED TO A FLEXIBLE PLASTIC SHEATH DESIGNED TO SAFELY REMOVE DEBRIS FROM FLEXIBLE ENDOSCOPES.

## (undated) DEVICE — GAUZE,SPONGE,FLUFF,6"X6.75",STRL,10/TRAY: Brand: MEDLINE

## (undated) DEVICE — TUBE DURA VENTILATION

## (undated) DEVICE — YANKAUER,BULB TIP WITH VENT: Brand: ARGYLE

## (undated) DEVICE — CONMED SCOPE SAVER BITE BLOCK, 20X27 MM: Brand: SCOPE SAVER

## (undated) DEVICE — SENSR O2 OXIMAX FNGR A/ 18IN NONSTR

## (undated) DEVICE — WIPE INST MEROCEL

## (undated) DEVICE — TBG SMPL FLTR LINE NASL 02/C02 A/ BX/100

## (undated) DEVICE — SENSOR OXMTR PED /INFANT L1FT ADH WRP DISP TRUSIGNAL

## (undated) DEVICE — CODMAN® SURGICAL PATTIES 1" X 3" (2.54CM X 7.62CM): Brand: CODMAN®

## (undated) DEVICE — MSK O2 MD CONCENTR A/ LF 7FT 1P/U

## (undated) DEVICE — GAUZE,SPONGE,4"X4",16PLY,XRAY,STRL,LF: Brand: MEDLINE

## (undated) DEVICE — CUFF,BP,DISP,1 TUBE,ADULT,HP: Brand: MEDLINE

## (undated) DEVICE — CIRCUIT BRTH PED L108IN 1L BACT AND VIR FLTR PARA WYE 2 LIMB

## (undated) DEVICE — STERILE COTTON TIP 6IN 10PK: Brand: MEDLINE

## (undated) DEVICE — DEFOGGER!" ANTI FOG KIT: Brand: DEROYAL

## (undated) DEVICE — DRSNG SURESITE WNDW 4X4.5

## (undated) DEVICE — INTENT TO BE USED WITH SUTURE MATERIAL FOR TISSUE CLOSURE: Brand: RICHARD-ALLAN®  NEEDLE 1/2 CIRCLE REVERSE CUTTING

## (undated) DEVICE — GLV SURG BIOGEL SENSR LTX PF SZ7.5

## (undated) DEVICE — INTENDED FOR TISSUE SEPARATION, AND OTHER PROCEDURES THAT REQUIRE A SHARP SURGICAL BLADE TO PUNCTURE OR CUT.: Brand: BARD-PARKER ® CARBON RIB-BACK BLADES

## (undated) DEVICE — TOWEL,OR,DSP,ST,BLUE,DLX,4/PK,20PK/CS: Brand: MEDLINE

## (undated) DEVICE — GAUZE,SPONGE,4"X4",8PLY,STRL,LF,10/TRAY: Brand: MEDLINE

## (undated) DEVICE — SWABSTK SKINPREP PVPI LF PK/3

## (undated) DEVICE — MASK,OXYGEN,MED CONC,ADLT,7' TUB, UC: Brand: PENDING

## (undated) DEVICE — SYRINGE 20ML LL S/C 50

## (undated) DEVICE — STERILE TOOTHPICK SET: Brand: CENTURION

## (undated) DEVICE — PK ENT 40

## (undated) DEVICE — GLOVE SURG SZ 7 CRM LTX FREE POLYISOPRENE POLYMER BEAD ANTI

## (undated) DEVICE — ELECTRD BLD EZ CLN MOD 2.5IN

## (undated) DEVICE — DUAL LUMEN STOMACH TUBE: Brand: SALEM SUMP

## (undated) DEVICE — SUT GUT PLN FAST ABS 5/0 PC1 18IN 1915G

## (undated) DEVICE — BLADE 45DEG EAR UNITOM SPEAR TIP NAR

## (undated) DEVICE — APPL CHLORAPREP W/TINT 26ML ORNG

## (undated) DEVICE — THE SINGLE USE ETRAP – POLYP TRAP IS USED FOR SUCTION RETRIEVAL OF ENDOSCOPICALLY REMOVED POLYPS.: Brand: ETRAP

## (undated) DEVICE — TUBING, SUCTION, 1/4" X 20', STRAIGHT: Brand: MEDLINE INDUSTRIES, INC.

## (undated) DEVICE — SYSTEM  EUSTACHIAN TUBE DILATION

## (undated) DEVICE — SUT GUT CHRM 4/0 P3 18IN 1654G

## (undated) DEVICE — DRSNG TELFA PAD NONADH STR 1S 3X4IN

## (undated) DEVICE — AMBU AURA-I U SIZE 3, DISPOSABLE LARYNGEAL MASK: Brand: AURA-I

## (undated) DEVICE — CROUCH CORNEAL PROTECTOR: Brand: BAUSCH + LOMB

## (undated) DEVICE — IMMOB HD UNIV CLR DISP

## (undated) DEVICE — GOWN,NON-REINFORCED,SIRUS,SET IN SLV,XL: Brand: MEDLINE

## (undated) DEVICE — SHEET, DRAPE, SPLIT, STERILE: Brand: MEDLINE

## (undated) DEVICE — NDL HYPO PRECISIONGLIDE REG 25G 1 1/2

## (undated) DEVICE — MASK ANES CHILD SM SZ 3 SUP ERGO RND STYL FLX EC ULT THN

## (undated) DEVICE — AMBU AURA-I U SIZE 4, DISPOSABLE LARYNGEAL MASK: Brand: AURA-I

## (undated) DEVICE — TOWEL,OR,DSP,ST,BLUE,STD,4/PK,20PK/CS: Brand: MEDLINE

## (undated) DEVICE — ABSORBENT COTTON BALLS: Brand: DEROYAL

## (undated) DEVICE — SUT VIC 4/0 P3 18IN J494G

## (undated) DEVICE — DOUBLE BASIN PLUS 2-LF: Brand: MEDLINE INDUSTRIES, INC.

## (undated) DEVICE — FRCP BX RADJAW4 NDL 2.8 240 STD OG

## (undated) DEVICE — SUT SILK 6/0 P1 18IN 639G